# Patient Record
Sex: MALE | Race: WHITE | NOT HISPANIC OR LATINO | Employment: FULL TIME | URBAN - METROPOLITAN AREA
[De-identification: names, ages, dates, MRNs, and addresses within clinical notes are randomized per-mention and may not be internally consistent; named-entity substitution may affect disease eponyms.]

---

## 2023-04-26 ENCOUNTER — OFFICE VISIT (OUTPATIENT)
Dept: PRIMARY CARE | Facility: CLINIC | Age: 53
End: 2023-04-26
Payer: COMMERCIAL

## 2023-04-26 VITALS
SYSTOLIC BLOOD PRESSURE: 132 MMHG | DIASTOLIC BLOOD PRESSURE: 79 MMHG | BODY MASS INDEX: 28 KG/M2 | HEIGHT: 78 IN | RESPIRATION RATE: 18 BRPM | WEIGHT: 242 LBS

## 2023-04-26 DIAGNOSIS — R73.01 ELEVATED FASTING GLUCOSE: ICD-10-CM

## 2023-04-26 DIAGNOSIS — Z00.00 HEALTH MAINTENANCE EXAMINATION: Primary | ICD-10-CM

## 2023-04-26 DIAGNOSIS — N52.9 ERECTILE DYSFUNCTION, UNSPECIFIED ERECTILE DYSFUNCTION TYPE: ICD-10-CM

## 2023-04-26 DIAGNOSIS — M54.16 LUMBAR RADICULOPATHY: ICD-10-CM

## 2023-04-26 DIAGNOSIS — Z12.11 COLON CANCER SCREENING: ICD-10-CM

## 2023-04-26 DIAGNOSIS — Z23 IMMUNIZATION DUE: ICD-10-CM

## 2023-04-26 DIAGNOSIS — E03.8 OTHER SPECIFIED HYPOTHYROIDISM: ICD-10-CM

## 2023-04-26 DIAGNOSIS — Z72.0 TOBACCO USE: ICD-10-CM

## 2023-04-26 DIAGNOSIS — K22.719 BARRETT'S ESOPHAGUS WITH DYSPLASIA: ICD-10-CM

## 2023-04-26 PROCEDURE — 1036F TOBACCO NON-USER: CPT | Performed by: STUDENT IN AN ORGANIZED HEALTH CARE EDUCATION/TRAINING PROGRAM

## 2023-04-26 PROCEDURE — 90715 TDAP VACCINE 7 YRS/> IM: CPT | Performed by: STUDENT IN AN ORGANIZED HEALTH CARE EDUCATION/TRAINING PROGRAM

## 2023-04-26 PROCEDURE — 90677 PCV20 VACCINE IM: CPT | Performed by: STUDENT IN AN ORGANIZED HEALTH CARE EDUCATION/TRAINING PROGRAM

## 2023-04-26 PROCEDURE — 90472 IMMUNIZATION ADMIN EACH ADD: CPT | Performed by: STUDENT IN AN ORGANIZED HEALTH CARE EDUCATION/TRAINING PROGRAM

## 2023-04-26 PROCEDURE — 99396 PREV VISIT EST AGE 40-64: CPT | Performed by: STUDENT IN AN ORGANIZED HEALTH CARE EDUCATION/TRAINING PROGRAM

## 2023-04-26 PROCEDURE — 90471 IMMUNIZATION ADMIN: CPT | Performed by: STUDENT IN AN ORGANIZED HEALTH CARE EDUCATION/TRAINING PROGRAM

## 2023-04-26 RX ORDER — NICOTINE 7MG/24HR
1 PATCH, TRANSDERMAL 24 HOURS TRANSDERMAL EVERY 24 HOURS
Qty: 14 PATCH | Refills: 0 | Status: SHIPPED | OUTPATIENT
Start: 2023-04-26 | End: 2023-04-26 | Stop reason: SDUPTHER

## 2023-04-26 RX ORDER — OMEPRAZOLE 20 MG/1
20 CAPSULE, DELAYED RELEASE ORAL DAILY
Qty: 30 CAPSULE | Refills: 11 | Status: SHIPPED | OUTPATIENT
Start: 2023-04-26 | End: 2023-04-26 | Stop reason: SDUPTHER

## 2023-04-26 RX ORDER — IBUPROFEN 200 MG
1 TABLET ORAL EVERY 24 HOURS
Qty: 14 PATCH | Refills: 0 | Status: SHIPPED | OUTPATIENT
Start: 2023-04-26 | End: 2024-01-23 | Stop reason: ALTCHOICE

## 2023-04-26 RX ORDER — IBUPROFEN 200 MG
1 TABLET ORAL EVERY 24 HOURS
Qty: 42 PATCH | Refills: 0 | Status: SHIPPED | OUTPATIENT
Start: 2023-04-26 | End: 2024-01-23 | Stop reason: ALTCHOICE

## 2023-04-26 RX ORDER — NICOTINE 7MG/24HR
1 PATCH, TRANSDERMAL 24 HOURS TRANSDERMAL EVERY 24 HOURS
Qty: 14 PATCH | Refills: 0 | Status: SHIPPED | OUTPATIENT
Start: 2023-04-26 | End: 2024-01-23 | Stop reason: ALTCHOICE

## 2023-04-26 RX ORDER — IBUPROFEN 200 MG
1 TABLET ORAL EVERY 24 HOURS
Qty: 42 PATCH | Refills: 0 | Status: SHIPPED | OUTPATIENT
Start: 2023-04-26 | End: 2023-04-26 | Stop reason: SDUPTHER

## 2023-04-26 RX ORDER — LEVOTHYROXINE SODIUM 112 UG/1
112 TABLET ORAL DAILY
Qty: 90 TABLET | Refills: 3 | Status: SHIPPED | OUTPATIENT
Start: 2023-04-26 | End: 2024-02-27 | Stop reason: SDUPTHER

## 2023-04-26 RX ORDER — OMEPRAZOLE 20 MG/1
20 CAPSULE, DELAYED RELEASE ORAL DAILY
Qty: 90 CAPSULE | Refills: 3 | Status: SHIPPED | OUTPATIENT
Start: 2023-04-26 | End: 2024-05-30 | Stop reason: WASHOUT

## 2023-04-26 RX ORDER — IBUPROFEN 200 MG
1 TABLET ORAL EVERY 24 HOURS
Qty: 14 PATCH | Refills: 0 | Status: SHIPPED | OUTPATIENT
Start: 2023-04-26 | End: 2023-04-26 | Stop reason: SDUPTHER

## 2023-04-26 RX ORDER — LEVOTHYROXINE SODIUM 112 UG/1
112 TABLET ORAL DAILY
Qty: 30 TABLET | Refills: 11 | Status: SHIPPED | OUTPATIENT
Start: 2023-04-26 | End: 2023-04-26 | Stop reason: SDUPTHER
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
## 2023-05-01 ENCOUNTER — LAB (OUTPATIENT)
Dept: LAB | Facility: LAB | Age: 53
End: 2023-05-01
Payer: COMMERCIAL

## 2023-05-01 DIAGNOSIS — R73.01 ELEVATED FASTING GLUCOSE: ICD-10-CM

## 2023-05-01 DIAGNOSIS — E03.8 OTHER SPECIFIED HYPOTHYROIDISM: ICD-10-CM

## 2023-05-01 DIAGNOSIS — N52.9 ERECTILE DYSFUNCTION, UNSPECIFIED ERECTILE DYSFUNCTION TYPE: ICD-10-CM

## 2023-05-01 DIAGNOSIS — Z00.00 HEALTH MAINTENANCE EXAMINATION: ICD-10-CM

## 2023-05-01 DIAGNOSIS — E78.1 HYPERTRIGLYCERIDEMIA: Primary | ICD-10-CM

## 2023-05-01 LAB
ALANINE AMINOTRANSFERASE (SGPT) (U/L) IN SER/PLAS: 21 U/L (ref 10–52)
ALBUMIN (G/DL) IN SER/PLAS: 4.6 G/DL (ref 3.4–5)
ALKALINE PHOSPHATASE (U/L) IN SER/PLAS: 80 U/L (ref 33–120)
ANION GAP IN SER/PLAS: 12 MMOL/L (ref 10–20)
ASPARTATE AMINOTRANSFERASE (SGOT) (U/L) IN SER/PLAS: 16 U/L (ref 9–39)
BILIRUBIN TOTAL (MG/DL) IN SER/PLAS: 0.6 MG/DL (ref 0–1.2)
CALCIDIOL (25 OH VITAMIN D3) (NG/ML) IN SER/PLAS: 14 NG/ML
CALCIUM (MG/DL) IN SER/PLAS: 9.5 MG/DL (ref 8.6–10.3)
CARBON DIOXIDE, TOTAL (MMOL/L) IN SER/PLAS: 29 MMOL/L (ref 21–32)
CHLORIDE (MMOL/L) IN SER/PLAS: 103 MMOL/L (ref 98–107)
CHOLESTEROL (MG/DL) IN SER/PLAS: 136 MG/DL (ref 0–199)
CHOLESTEROL IN HDL (MG/DL) IN SER/PLAS: 30.9 MG/DL
CHOLESTEROL IN LDL (MG/DL) IN SER/PLAS BY DIRECT ASSAY: 78 MG/DL (ref 0–129)
CHOLESTEROL/HDL RATIO: 4.4
CREATININE (MG/DL) IN SER/PLAS: 0.99 MG/DL (ref 0.5–1.3)
ERYTHROCYTE DISTRIBUTION WIDTH (RATIO) BY AUTOMATED COUNT: 12.9 % (ref 11.5–14.5)
ERYTHROCYTE MEAN CORPUSCULAR HEMOGLOBIN CONCENTRATION (G/DL) BY AUTOMATED: 34.7 G/DL (ref 32–36)
ERYTHROCYTE MEAN CORPUSCULAR VOLUME (FL) BY AUTOMATED COUNT: 93 FL (ref 80–100)
ERYTHROCYTES (10*6/UL) IN BLOOD BY AUTOMATED COUNT: 5.14 X10E12/L (ref 4.5–5.9)
ESTIMATED AVERAGE GLUCOSE FOR HBA1C: 151 MG/DL
GFR MALE: >90 ML/MIN/1.73M2
GLUCOSE (MG/DL) IN SER/PLAS: 137 MG/DL (ref 74–99)
HEMATOCRIT (%) IN BLOOD BY AUTOMATED COUNT: 47.9 % (ref 41–52)
HEMOGLOBIN (G/DL) IN BLOOD: 16.6 G/DL (ref 13.5–17.5)
HEMOGLOBIN A1C/HEMOGLOBIN TOTAL IN BLOOD: 6.9 %
HEPATITIS C VIRUS AB PRESENCE IN SERUM: NONREACTIVE
LDL: ABNORMAL MG/DL (ref 0–99)
LEUKOCYTES (10*3/UL) IN BLOOD BY AUTOMATED COUNT: 8.7 X10E9/L (ref 4.4–11.3)
NON HDL CHOLESTEROL: 105 MG/DL
PLATELETS (10*3/UL) IN BLOOD AUTOMATED COUNT: 287 X10E9/L (ref 150–450)
POTASSIUM (MMOL/L) IN SER/PLAS: 3.8 MMOL/L (ref 3.5–5.3)
PROTEIN TOTAL: 7 G/DL (ref 6.4–8.2)
SODIUM (MMOL/L) IN SER/PLAS: 140 MMOL/L (ref 136–145)
THYROTROPIN (MIU/L) IN SER/PLAS BY DETECTION LIMIT <= 0.05 MIU/L: 5.07 MIU/L (ref 0.44–3.98)
THYROXINE (T4) FREE (NG/DL) IN SER/PLAS: 0.85 NG/DL (ref 0.61–1.12)
TRIGLYCERIDE (MG/DL) IN SER/PLAS: 484 MG/DL (ref 0–149)
UREA NITROGEN (MG/DL) IN SER/PLAS: 18 MG/DL (ref 6–23)
VLDL: ABNORMAL MG/DL (ref 0–40)

## 2023-05-01 PROCEDURE — 80061 LIPID PANEL: CPT

## 2023-05-01 PROCEDURE — 83036 HEMOGLOBIN GLYCOSYLATED A1C: CPT

## 2023-05-01 PROCEDURE — 85027 COMPLETE CBC AUTOMATED: CPT

## 2023-05-01 PROCEDURE — 36415 COLL VENOUS BLD VENIPUNCTURE: CPT

## 2023-05-01 PROCEDURE — 80053 COMPREHEN METABOLIC PANEL: CPT

## 2023-05-01 PROCEDURE — 84403 ASSAY OF TOTAL TESTOSTERONE: CPT

## 2023-05-01 PROCEDURE — 86803 HEPATITIS C AB TEST: CPT

## 2023-05-01 PROCEDURE — 84439 ASSAY OF FREE THYROXINE: CPT

## 2023-05-01 PROCEDURE — 82306 VITAMIN D 25 HYDROXY: CPT

## 2023-05-01 PROCEDURE — 83721 ASSAY OF BLOOD LIPOPROTEIN: CPT

## 2023-05-01 PROCEDURE — 84402 ASSAY OF FREE TESTOSTERONE: CPT

## 2023-05-01 PROCEDURE — 84443 ASSAY THYROID STIM HORMONE: CPT

## 2023-05-04 RX ORDER — IBUPROFEN 100 MG/5ML
1 SUSPENSION, ORAL (FINAL DOSE FORM) ORAL DAILY
COMMUNITY

## 2023-05-10 DIAGNOSIS — M54.16 LUMBAR RADICULOPATHY: Primary | ICD-10-CM

## 2023-05-10 NOTE — PROGRESS NOTES
MRI L spine denied by insurance given no recent attempt at PT   Physical therapy referral placed today   MRI L spine deferred at this time  Prior plain films in 04/2022, defer repeat plain film imaging at this time  Will again pursue MRI L spine w/o contrast if no improvement with PT    Faizan Purvis MD

## 2023-05-12 LAB
TESTOSTERONE FREE (CHAN): 75.1 PG/ML (ref 35–155)
TESTOSTERONE,TOTAL,LC-MS/MS: 278 NG/DL (ref 250–1100)

## 2023-08-07 ENCOUNTER — TELEPHONE (OUTPATIENT)
Dept: PRIMARY CARE | Facility: CLINIC | Age: 53
End: 2023-08-07

## 2023-08-07 DIAGNOSIS — Z72.0 TOBACCO USE: Primary | ICD-10-CM

## 2023-08-07 DIAGNOSIS — Z12.2 ENCOUNTER FOR SCREENING FOR LUNG CANCER: ICD-10-CM

## 2023-08-07 NOTE — PROGRESS NOTES
Lung cancer screening CT ordered.   Patient would like to proceed as discussed during visit in 04/2023.  40 pack year history.     Faizan Purvis MD

## 2023-08-30 ENCOUNTER — LAB (OUTPATIENT)
Dept: LAB | Facility: LAB | Age: 53
End: 2023-08-30
Payer: COMMERCIAL

## 2023-08-30 DIAGNOSIS — E03.8 OTHER SPECIFIED HYPOTHYROIDISM: ICD-10-CM

## 2023-08-30 DIAGNOSIS — E78.1 HYPERTRIGLYCERIDEMIA: ICD-10-CM

## 2023-08-30 DIAGNOSIS — R73.01 ELEVATED FASTING GLUCOSE: ICD-10-CM

## 2023-08-30 LAB
CHOLESTEROL (MG/DL) IN SER/PLAS: 121 MG/DL (ref 0–199)
CHOLESTEROL IN HDL (MG/DL) IN SER/PLAS: 33.9 MG/DL
CHOLESTEROL/HDL RATIO: 3.6
ESTIMATED AVERAGE GLUCOSE FOR HBA1C: 126 MG/DL
HEMOGLOBIN A1C/HEMOGLOBIN TOTAL IN BLOOD: 6 %
LDL: 60 MG/DL (ref 0–99)
THYROTROPIN (MIU/L) IN SER/PLAS BY DETECTION LIMIT <= 0.05 MIU/L: 3.72 MIU/L (ref 0.44–3.98)
TRIGLYCERIDE (MG/DL) IN SER/PLAS: 136 MG/DL (ref 0–149)
VLDL: 27 MG/DL (ref 0–40)

## 2023-08-30 PROCEDURE — 80061 LIPID PANEL: CPT

## 2023-08-30 PROCEDURE — 36415 COLL VENOUS BLD VENIPUNCTURE: CPT

## 2023-08-30 PROCEDURE — 84443 ASSAY THYROID STIM HORMONE: CPT

## 2023-08-30 PROCEDURE — 83036 HEMOGLOBIN GLYCOSYLATED A1C: CPT

## 2023-09-06 ENCOUNTER — APPOINTMENT (OUTPATIENT)
Dept: PRIMARY CARE | Facility: CLINIC | Age: 53
End: 2023-09-06
Payer: COMMERCIAL

## 2023-11-27 ENCOUNTER — OFFICE VISIT (OUTPATIENT)
Dept: ORTHOPEDIC SURGERY | Facility: HOSPITAL | Age: 53
End: 2023-11-27
Payer: COMMERCIAL

## 2023-11-27 DIAGNOSIS — M17.0 OSTEOARTHRITIS OF BOTH KNEES, UNSPECIFIED OSTEOARTHRITIS TYPE: Primary | ICD-10-CM

## 2023-11-27 PROCEDURE — 99203 OFFICE O/P NEW LOW 30 MIN: CPT | Performed by: EMERGENCY MEDICINE

## 2023-11-27 PROCEDURE — 1036F TOBACCO NON-USER: CPT | Performed by: EMERGENCY MEDICINE

## 2023-11-27 PROCEDURE — 99213 OFFICE O/P EST LOW 20 MIN: CPT | Performed by: EMERGENCY MEDICINE

## 2023-11-27 NOTE — PROGRESS NOTES
Subjective   Eliseo Patricio is a 53 y.o. male who presents for No chief complaint on file.    HPI  53-year-old male presents with complaint of bilateral knee pain that he had for quite some time but progressive worse in the past few weeks.  Pain is of gradual onset and constant duration with intermittent worsening, moderate severity pain is exacerbated by knee flexion and weightbearing activities.  Pain is temporarily alleviated by rest but pain is associated with stiffness and clicking.  Pain is not associated with acute trauma, deformed, ecchymosis, paresthesias, weakness, rash, erythema, or fevers.  Patient has known bilateral knee DJD, specifically in the patellofemoral compartments.  He has tried physical therapy, multiple analgesics, and previous corticosteroid injection therapy with limited relief.  Considering this, he presents today to discuss the option of viscosupplementation.    ROS: All pertinent positive symptoms are included in the history of present illness.    All other systems have been reviewed and are negative and noncontributory to this patient's current ailments.    Objective     There were no vitals filed for this visit.    Physical Exam  General/Constitutional: No apparent distress. Well-nourished and well developed.  Head: Normocephalic, Atraumatic.   Eyes: EOMI.  Vascular: No edema, swelling or tenderness, except as noted in detailed exam.  Respiratory: Non-labored breathing.  Integumentary: No impressive skin lesions present, except as noted in detailed exam.  Neurological: Alert and oriented.  Psychological:  Normal mood and affect.  Musculoskeletal: Normal, except as noted in detailed exam.  Right Knee  Flexion 130. Extension 0. Crepitus present with knee flexion/extension. No ecchymosis. Muscle strength 5 out of 5 with flexion and extension. Valgus stress negative. Varus stress negative.   Left Knee  Flexion 130. Extension 0. Crepitus present with knee flexion/extension. No ecchymosis.  Muscle strength 5 out of 5 with flexion and extension. Valgus stress negative. Varus stress negative.     XR KNEE 3 VIEWS BILATERAL    - Impression -  Small suprapatellar joint effusions.    Tricompartmental bilateral knee osteoarthrosis, most pronounced in  the patellofemoral compartments.    Assessment/Plan   Problem List Items Addressed This Visit    None  Visit Diagnoses       Osteoarthritis of both knees, unspecified osteoarthritis type    -  Primary          I discussed with patient and agree that their pain is likely due to worsening arthritis.  Considering they have failed previous conservative measures with activity modifications, therapy, multiple analgesics, and corticosteroid injections without significant improvement, I feel that HA supplementation would be the next best option.  We will begin preauthorization process for this.  I will see them back for HA supplementation injections when they are approved.    Osmar Griffin, DO  Sports Medicine  [facility]     ** Please excuse any errors in grammar or translation related to this dictation. Voice recognition software was utilized to prepare this document. **

## 2023-11-30 DIAGNOSIS — M17.0 OSTEOARTHRITIS OF BOTH KNEES, UNSPECIFIED OSTEOARTHRITIS TYPE: ICD-10-CM

## 2023-12-06 RX ORDER — HYALURONATE SODIUM 20 MG/2 ML
20 SYRINGE (ML) INTRAARTICULAR
Qty: 12 ML | Refills: 0 | Status: SHIPPED | OUTPATIENT
Start: 2023-12-06 | End: 2024-05-30 | Stop reason: WASHOUT

## 2023-12-14 ENCOUNTER — OFFICE VISIT (OUTPATIENT)
Dept: ORTHOPEDIC SURGERY | Facility: HOSPITAL | Age: 53
End: 2023-12-14
Payer: COMMERCIAL

## 2023-12-14 DIAGNOSIS — M77.12 LATERAL EPICONDYLITIS OF LEFT ELBOW: ICD-10-CM

## 2023-12-14 DIAGNOSIS — M75.22 BICEPS TENDINITIS OF LEFT SHOULDER: ICD-10-CM

## 2023-12-14 PROCEDURE — 99214 OFFICE O/P EST MOD 30 MIN: CPT | Performed by: EMERGENCY MEDICINE

## 2023-12-14 PROCEDURE — 1036F TOBACCO NON-USER: CPT | Performed by: EMERGENCY MEDICINE

## 2023-12-14 NOTE — PROGRESS NOTES
Chief Complaint: Pain of the Left Elbow  History of Present Illness:  Encounter date: 12/14/2023  Eliseo Patricio is a 53 y.o. male who presents today for evaluation of left elbow pain.  He is a  and right-hand-dominant.  This started approximately 6 months ago.  He rates the pain as a 6/10 today and describes it as sharp.  He localizes it to the medial and lateral left elbow.  Pain is worse when he picks up heavy objects.  He does have a feeling like his arm is going to give out on him.  He has been working with orthopedic surgery at the [facility].  He did occupational therapy for just over a month and had some improvement in his pain.  He had an MRI performed in September of this year which showed biceps tendinosis and radial capitellar osteoarthritis.  He denies radiation of this pain, numbness, tingling.    Problem List  There are no problems to display for this patient.      Past Medical History  No past medical history on file.    Past Surgical History  Past Surgical History:   Procedure Laterality Date    OTHER SURGICAL HISTORY  12/12/2019    [place] tooth extraction    OTHER SURGICAL HISTORY  12/12/2019    Tonsillectomy       Social History  Social History     Socioeconomic History    Marital status:      Spouse name: Not on file    Number of children: Not on file    Years of education: Not on file    Highest education level: Not on file   Occupational History    Not on file   Tobacco Use    Smoking status: Never    Smokeless tobacco: Never   Substance and Sexual Activity    Alcohol use: Not on file    Drug use: Not on file    Sexual activity: Not on file   Other Topics Concern    Not on file   Social History Narrative    Not on file     Social Determinants of Health     Financial Resource Strain: Not on file   Food Insecurity: Not on file   Transportation Needs: Not on file   Physical Activity: Not on file   Stress: Not on file   Social Connections: Not on file   Intimate  Partner Violence: Not on file   Housing Stability: Not on file       Allergies  Allergies   Allergen Reactions    Penicillins Hives       Medications  Current Outpatient Medications   Medication Sig Dispense Refill    levothyroxine (Synthroid, Levoxyl) 112 mcg tablet Take 1 tablet (112 mcg) by mouth once daily. 90 tablet 3    omeprazole (PriLOSEC) 20 mg DR capsule Take 1 capsule (20 mg) by mouth once daily. Do not crush or chew. 90 capsule 3    ascorbic acid (Vitamin C) 1,000 mg tablet Take 1 tablet (1,000 mg) by mouth once daily.      nicotine (Nicoderm CQ) 14 mg/24 hr patch Place 1 patch over 24 hours on the skin once every 24 hours for 14 days. 14 patch 0    nicotine (Nicoderm CQ) 21 mg/24 hr patch Place 1 patch over 24 hours on the skin once every 24 hours. 42 patch 0    nicotine (Nicoderm CQ) 7 mg/24 hr patch Place 1 patch over 24 hours on the skin once every 24 hours for 14 days. 14 patch 0    sodium hyaluronate (Euflexxa) 10 mg/mL(mw 2.4 -3.6 million) injection Inject 2 mL (20 mg) into the joint 1 (one) time per week. Inject intra-articularly into each knee on time 12 mL 0     No current facility-administered medications for this visit.       Physical Exam:  Left elbow Exam  Elbow range of motion is full. Pain with terminal extension. No elbow effusion. Tenderness to palpation over the lateral epicondyle and common extensor/supinator tendon and tenderness over the lateral portion of the distal biceps tendon.  No pain with palpation over the radial head.  Pain with resisted wrist extension, middle finger extension, and supination and mild pain with resisted elbow flexion.  Bicep strength 5/5.      Problem List Items Addressed This Visit    None  Visit Diagnoses         Codes    Lateral epicondylitis of left elbow     M77.12    Relevant Orders    Referral to Occupational Therapy    Biceps tendinitis of left shoulder     M75.22    Relevant Orders    Referral to Occupational Therapy          Patient  Discussion/Summary  Eliseo Patircio is a 53 y.o. who presents today for left elbow pain.  He is 6 months of left elbow pain and has been following with [facility] to begin and completed occupational therapy in October which did improve some of his pain. On physical exam he has had some tenderness over the biceps tendon and the extensor tendon. Discussed treatment options with the patients, we agreed to restart OT, trial Voltaren gel. He should follow up in 6 weeks. If he continues to have no improvement at that time we will get an MRI.     Donald Holland, DO  Primary Care Sports Medicine Fellow    ** Please excuse any errors in grammar or translation related to this dictation. Voice recognition software was utilized to prepare this document. **

## 2023-12-27 ENCOUNTER — EVALUATION (OUTPATIENT)
Dept: OCCUPATIONAL THERAPY | Facility: CLINIC | Age: 53
End: 2023-12-27
Payer: COMMERCIAL

## 2023-12-27 DIAGNOSIS — M75.22 BICEPS TENDINITIS OF LEFT SHOULDER: ICD-10-CM

## 2023-12-27 DIAGNOSIS — M77.12 LATERAL EPICONDYLITIS OF LEFT ELBOW: ICD-10-CM

## 2023-12-27 PROCEDURE — 97165 OT EVAL LOW COMPLEX 30 MIN: CPT | Mod: GO | Performed by: OCCUPATIONAL THERAPIST

## 2023-12-27 PROCEDURE — 97140 MANUAL THERAPY 1/> REGIONS: CPT | Mod: GO | Performed by: OCCUPATIONAL THERAPIST

## 2023-12-27 ASSESSMENT — PAIN SCALES - GENERAL: PAINLEVEL_OUTOF10: 7

## 2023-12-27 ASSESSMENT — PAIN - FUNCTIONAL ASSESSMENT: PAIN_FUNCTIONAL_ASSESSMENT: 0-10

## 2023-12-27 ASSESSMENT — PATIENT HEALTH QUESTIONNAIRE - PHQ9
1. LITTLE INTEREST OR PLEASURE IN DOING THINGS: NOT AT ALL
2. FEELING DOWN, DEPRESSED OR HOPELESS: NOT AT ALL
SUM OF ALL RESPONSES TO PHQ9 QUESTIONS 1 AND 2: 0

## 2023-12-27 ASSESSMENT — ENCOUNTER SYMPTOMS
DEPRESSION: 0
OCCASIONAL FEELINGS OF UNSTEADINESS: 0
LOSS OF SENSATION IN FEET: 0

## 2023-12-27 ASSESSMENT — PAIN DESCRIPTION - DESCRIPTORS: DESCRIPTORS: SHARP

## 2023-12-27 NOTE — PROGRESS NOTES
Occupational Therapy    Occupational Therapy Evaluation    Name: Eliseo Patricio  MRN: [id number]  : 1970   DATE: 23   Time Calculation  Start Time: 1205  Stop Time: 1255  Time Calculation (min): 50 min     Insurance Authorization Request: [employer], 20.00 COPAY, 100% COVERAGE, 40V ( 32 REMAIN) NO AUTH     Assessment:  Patient is a 53 y.o. male who is diagnosed with left biceps tendinosis and lateral epicondylitis, treated conservatively.       Patient presented with left elbow pain localized in distal bicep tendon and lateral elbow.  He resides home alone independently, works as a  (job tasks include heavy lifting). 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 Started patient on stretching exercises to resume muscle balancing. Also issued him scraper tool for soft tissue mobilization at home along distal biceps/brachioradialis/wrist extensor wad to promote tissue healing. Patient will benefit from skilled OT for pain management and later progress to strengthening to support return to all ADL/ IADL, work, and leisure activities.     Plan:  Outpatient Plan  OT Plan: Modalities, therapeutic exercise, therapeutic activity, neuromotor re-education, manual therapy  Frequency: 1x/wk  Duration: 4 weeks  Rehab Potential: Good  Plan of Care Agreement: Patient    Subjective   Current Problem:  Problem List Items Addressed This Visit    None  Visit Diagnoses       Lateral epicondylitis of left elbow        Biceps tendinitis of left shoulder                 DOI: 2023    Surgical Procedure: N/A  DOS: N/A  Hand Dominance: right   Affected Extremity: left    Mechanism of Injury: Was doing bicep curls in  and started to have pain in left elbow. Received OT services at [facility] initially, was treated with ultrasound but found no relief with it. MRI confirmed biceps tendonesis. Recently developed lateral elbow pain as well.      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 Be able to lift grocery bags or metals at work without pain.\"      Objective   UE Assessment  Observation: No edema observed     Palpation: Mild tenderness upon palpation over distal biceps and lateral epicondyle    Range of Motion (in degrees)  Shoulder AROM: WNL    PROM: WNL    Elbow AROM:  WNL    PROM: WNL    Wrist AROM: WNL     PROM: WNL    Digits AROM:  WNL PROM: WNL    Thumb AROM: WNL   PROM: WNL    Sensation: Numbness in left fingertips from herniated disc in the neck      Strength: MMT  Elbow flexion 5/5 no pain   Forearm sup 5/5 reports lateral elbow  Wrist extension with elbow flexed 5/5 no pain  Wrist extension with elbow straight 5/5 no pain     Coordination: N/A     Treatment Performed: Instructed patient to perform biceps stretching and dorsal compartment stretching. Issued patient scraper tool for deep tissue mobilization over distal biceps/brachioradialis/lateral epicondyle/extensor wad to promote tissue healing. Also issued him a compression sleeve to wear at work for biofeedback during heavy lifting.     Outcome Measures:  Quick Dash Score: at eval 22.73%    OP EDUCATION:  Education  Individual(s) Educated: Patient  Education Provided: Anatomy & Physiology, Risk and benefits of OT discussed with patient or other, POC discussed and agreed upon  Home Program: PROM, Activity modification, Handout issued  Patient/Caregiver Demonstrated Understanding: yes  Plan of Care Discussed and Agreed Upon: yes  Patient Response to " Education: Patient/Caregiver Verbalized Understanding of Information, Patient/Caregiver Performed Return Demonstration of Exercises/Activities, Patient/Caregiver Asked Appropriate Questions     Goals:  By discharge (4 weeks) Eliseo Sintia  will achieve the following goals:     1. Patient to report pain 0/10 at rest for sleeping  2. Patient to lift and carry 20# with left hand with no difficulty to support his workout routine  3. Patient to demonstrate  strength left hand to be 70% of dominant side for work tasks   4. Patient to demonstrate proper technique and competence with HEP   5. Patient to score disability rate less than 10% on Quick DASH

## 2024-01-08 ENCOUNTER — TREATMENT (OUTPATIENT)
Dept: OCCUPATIONAL THERAPY | Facility: CLINIC | Age: 54
End: 2024-01-08
Payer: COMMERCIAL

## 2024-01-08 DIAGNOSIS — M77.12 LATERAL EPICONDYLITIS OF LEFT ELBOW: Primary | ICD-10-CM

## 2024-01-08 DIAGNOSIS — M75.22 BICEPS TENDINITIS OF LEFT SHOULDER: ICD-10-CM

## 2024-01-08 PROCEDURE — 97535 SELF CARE MNGMENT TRAINING: CPT | Mod: GO

## 2024-01-08 PROCEDURE — 97110 THERAPEUTIC EXERCISES: CPT | Mod: GO

## 2024-01-08 ASSESSMENT — ACTIVITIES OF DAILY LIVING (ADL): HOME_MANAGEMENT_TIME_ENTRY: 30

## 2024-01-08 NOTE — PROGRESS NOTES
Occupational Therapy    Occupational Therapy Evaluation    Name: Eliseo Patricio  MRN: [id number]  : 1970   DATE: 24         Insurance Authorization Request: [employer], 20.00 COPAY, 100% COVERAGE, 40V ( 32 REMAIN) NO AUTH     Assessment:  Patient is a 53 y.o. male who is diagnosed with left biceps tendinosis and lateral epicondylitis, treated conservatively.     He has decreased pain in Left bicep and medial elbow and attributes decreased pain due to doing less heavy repetitive lifting/carrying at work.    Patient presented with mild palpable left lateral>medial elbow pain and distal bicep tendrness.  He is getting better and will cancel next week with a recheck in 2 weeks.  He resides home alone independently, works as a  (job tasks include heavy lifting). 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 Started patient on stretching exercises to resume muscle balancing. Also issued him scraper tool for soft tissue mobilization at home along distal biceps/brachioradialis/wrist extensor wad to promote tissue healing. Patient will benefit from skilled OT for pain management and later progress to strengthening to support return to all ADL/ IADL, work, and leisure activities.     Plan:  RTC in 2 weeks to recheck symptoms.  He is getting better, but this week will be the 1st full week since the holiday 4 day/work week.  If symptoms do not continue to improve, then will initiate modalities and soft tissue work.       Subjective  Is doing the stretches a couple of times a day.  Is attending the gym 3-4 x week.  Current Problem:  Problem List Items Addressed This Visit    None         DOI: 2023    Surgical Procedure: N/A  DOS: N/A  Hand Dominance: right   Affected Extremity: left    Mechanism of Injury: Was doing bicep curls in  and started to have pain in left elbow. Received OT services at [facility] initially, was treated with ultrasound but found no  "relief with it. MRI confirmed biceps tendonesis. Recently developed lateral elbow pain as well.      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 Be able to lift grocery bags or metals at work without pain.\"      Objective   UE Assessment  Observation: No edema observed     Palpation: Mild tenderness upon palpation over distal biceps and lateral epicondyle    Range of Motion (in degrees)  Shoulder AROM: WNL    PROM: WNL    Elbow AROM:  WNL    PROM: WNL    Wrist AROM: WNL     PROM: WNL    Digits AROM:  WNL PROM: WNL    Thumb AROM: WNL   PROM: WNL    Sensation: Numbness in left fingertips from herniated disc in the neck      Strength: MMT  Elbow flexion 5/5 no pain   Forearm sup 5/5 reports lateral elbow  Wrist extension with elbow flexed 5/5 no pain  Wrist extension with elbow straight 5/5 no pain     Coordination: N/A     Treatment Performed: Instructed patient to perform biceps stretching and dorsal compartment stretching. Issued patient scraper tool for deep tissue mobilization over distal biceps/brachioradialis/lateral epicondyle/extensor wad to promote tissue healing. Also issued him a compression sleeve to wear at work for biofeedback during heavy lifting.     In 3:30p out 4:15p  45 min  Home management:  2 units x 30 min  TE x 1 - 15 min  Ergonomic education   provided a compression sleeve to wear at work and at night prn (might purchase a 2nd to wear at night)  Tubigrip size E provided x 2  Reviewed gym guidelines:  strengthen core muscles and " extensors vs flexors  Red tband core strengthening sitting or supine:  scapula retractio  n  Outcome Measures:  Quick Dash Score: at eval 22.73%      Goals:  By discharge (4 weeks) Eliseo Patricio  will achieve the following goals:     1. Patient to report pain 0/10 at rest for sleeping  2. Patient to lift and carry 20# with left hand with no difficulty to support his workout routine  3. Patient to demonstrate  strength left hand to be 70% of dominant side for work tasks   4. Patient to demonstrate proper technique and competence with HEP   5. Patient to score disability rate less than 10% on Quick DASH

## 2024-01-15 ENCOUNTER — APPOINTMENT (OUTPATIENT)
Dept: OCCUPATIONAL THERAPY | Facility: CLINIC | Age: 54
End: 2024-01-15
Payer: COMMERCIAL

## 2024-01-19 ENCOUNTER — APPOINTMENT (OUTPATIENT)
Dept: PRIMARY CARE | Facility: CLINIC | Age: 54
End: 2024-01-19
Payer: COMMERCIAL

## 2024-01-22 ENCOUNTER — APPOINTMENT (OUTPATIENT)
Dept: OCCUPATIONAL THERAPY | Facility: CLINIC | Age: 54
End: 2024-01-22
Payer: COMMERCIAL

## 2024-01-22 ENCOUNTER — APPOINTMENT (OUTPATIENT)
Dept: ORTHOPEDIC SURGERY | Facility: HOSPITAL | Age: 54
End: 2024-01-22
Payer: COMMERCIAL

## 2024-01-23 ENCOUNTER — TELEMEDICINE (OUTPATIENT)
Dept: PRIMARY CARE | Facility: CLINIC | Age: 54
End: 2024-01-23
Payer: COMMERCIAL

## 2024-01-23 DIAGNOSIS — Z12.11 COLON CANCER SCREENING: ICD-10-CM

## 2024-01-23 DIAGNOSIS — K22.719 BARRETT'S ESOPHAGUS WITH DYSPLASIA: ICD-10-CM

## 2024-01-23 DIAGNOSIS — F51.01 PRIMARY INSOMNIA: Primary | ICD-10-CM

## 2024-01-23 PROCEDURE — 99442 PR PHYS/QHP TELEPHONE EVALUATION 11-20 MIN: CPT | Performed by: STUDENT IN AN ORGANIZED HEALTH CARE EDUCATION/TRAINING PROGRAM

## 2024-01-23 RX ORDER — HYDROXYZINE HYDROCHLORIDE 25 MG/1
25 TABLET, FILM COATED ORAL NIGHTLY PRN
Qty: 30 TABLET | Refills: 0 | Status: SHIPPED | OUTPATIENT
Start: 2024-01-23 | End: 2024-02-27 | Stop reason: ALTCHOICE

## 2024-01-29 ENCOUNTER — OFFICE VISIT (OUTPATIENT)
Dept: ORTHOPEDIC SURGERY | Facility: HOSPITAL | Age: 54
End: 2024-01-29
Payer: COMMERCIAL

## 2024-01-29 ENCOUNTER — APPOINTMENT (OUTPATIENT)
Dept: ORTHOPEDIC SURGERY | Facility: HOSPITAL | Age: 54
End: 2024-01-29
Payer: COMMERCIAL

## 2024-01-29 VITALS — HEIGHT: 75 IN | BODY MASS INDEX: 28.6 KG/M2 | WEIGHT: 230 LBS

## 2024-01-29 DIAGNOSIS — M17.0 OSTEOARTHRITIS OF BOTH KNEES, UNSPECIFIED OSTEOARTHRITIS TYPE: Primary | ICD-10-CM

## 2024-01-29 PROCEDURE — 99213 OFFICE O/P EST LOW 20 MIN: CPT | Performed by: EMERGENCY MEDICINE

## 2024-01-29 PROCEDURE — 2500000004 HC RX 250 GENERAL PHARMACY W/ HCPCS (ALT 636 FOR OP/ED): Performed by: EMERGENCY MEDICINE

## 2024-01-29 PROCEDURE — 20611 DRAIN/INJ JOINT/BURSA W/US: CPT | Performed by: EMERGENCY MEDICINE

## 2024-01-29 PROCEDURE — 1036F TOBACCO NON-USER: CPT | Performed by: EMERGENCY MEDICINE

## 2024-01-29 RX ADMIN — Medication 20 MG: at 15:32

## 2024-01-29 ASSESSMENT — PAIN SCALES - GENERAL: PAINLEVEL_OUTOF10: 3

## 2024-01-29 ASSESSMENT — ENCOUNTER SYMPTOMS
KNEE DEFORMITY: 1
KNEE SWELLING: 1

## 2024-01-29 ASSESSMENT — PAIN DESCRIPTION - DESCRIPTORS: DESCRIPTORS: ACHING

## 2024-01-29 ASSESSMENT — PAIN - FUNCTIONAL ASSESSMENT: PAIN_FUNCTIONAL_ASSESSMENT: 0-10

## 2024-01-29 NOTE — PROGRESS NOTES
"Chief Complaint: Follow-up of the Right Knee (Euflexxa Injection) and Follow-up of the Left Knee (Euflexxa Injection)  History of Present Illness:  Encounter date: 01/29/2024      Subjective   Eliseo Patricio is a 53 y.o. male who presents for Follow-up of the Right Knee (Euflexxa Injection) and Follow-up of the Left Knee (Euflexxa Injection)    Right Knee     Left Knee       1/29/24: Patient returns today for bilateral knee pain.  During his last visit on 11/27/2023, discussed the option of bilateral knee viscosupplementation injection.  He returns today to begin bilateral knee Euflexxa injection series.    11/27/23: 53-year-old male presents with complaint of bilateral knee pain that he had for quite some time but progressive worse in the past few weeks.  Pain is of gradual onset and constant duration with intermittent worsening, moderate severity pain is exacerbated by knee flexion and weightbearing activities.  Pain is temporarily alleviated by rest but pain is associated with stiffness and clicking.  Pain is not associated with acute trauma, deformed, ecchymosis, paresthesias, weakness, rash, erythema, or fevers.  Patient has known bilateral knee DJD, specifically in the patellofemoral compartments.  He has tried physical therapy, multiple analgesics, and previous corticosteroid injection therapy with limited relief.  Considering this, he presents today to discuss the option of viscosupplementation.    ROS: All pertinent positive symptoms are included in the history of present illness.    All other systems have been reviewed and are negative and noncontributory to this patient's current ailments.    Objective     Vitals:    01/29/24 1450   Weight: 104 kg (230 lb)   Height: 1.905 m (6' 3\")       Physical Exam  General/Constitutional: No apparent distress. Well-nourished and well developed.  Head: Normocephalic, Atraumatic.   Eyes: EOMI.  Vascular: No edema, swelling or tenderness, except as noted in detailed " exam.  Respiratory: Non-labored breathing.  Integumentary: No impressive skin lesions present, except as noted in detailed exam.  Neurological: Alert and oriented.  Psychological:  Normal mood and affect.  Musculoskeletal: Normal, except as noted in detailed exam.  Right Knee  Flexion 130. Extension 0. Crepitus present with knee flexion/extension. No ecchymosis. Muscle strength 5 out of 5 with flexion and extension. Valgus stress negative. Varus stress negative.   Left Knee  Flexion 130. Extension 0. Crepitus present with knee flexion/extension. No ecchymosis. Muscle strength 5 out of 5 with flexion and extension. Valgus stress negative. Varus stress negative.     Patient ID: Eliseo Patricio is a 53 y.o. male.    L Inj/Asp: R knee on 1/29/2024 3:32 PM  Indications: pain  Details: 22 G needle, ultrasound-guided superolateral approach  Medications: 20 mg sodium hyaluronate 10 mg/mL(mw 2.4 -3.6 million)  Outcome: tolerated well, no immediate complications  Procedure, treatment alternatives, risks and benefits explained, specific risks discussed. Immediately prior to procedure a time out was called to verify the correct patient, procedure, equipment, support staff and site/side marked as required. Patient was prepped and draped in the usual sterile fashion.       L Inj/Asp: L knee on 1/29/2024 3:32 PM  Indications: pain  Details: 22 G needle, ultrasound-guided superolateral approach  Medications: 20 mg sodium hyaluronate 10 mg/mL(mw 2.4 -3.6 million)  Outcome: tolerated well, no immediate complications  Procedure, treatment alternatives, risks and benefits explained, specific risks discussed. Immediately prior to procedure a time out was called to verify the correct patient, procedure, equipment, support staff and site/side marked as required. Patient was prepped and draped in the usual sterile fashion.           Assessment/Plan   Problem List Items Addressed This Visit    None  Visit Diagnoses       Osteoarthritis of  both knees, unspecified osteoarthritis type    -  Primary    Relevant Orders    Point of Care Ultrasound (Completed)            Discussed risks versus benefits of having injections performed. Patient has elected to proceed with procedures. Please refer to procedure notes above. Discussed with patient to limit weightbearing activities over the next 24-48 hours, they can then progress to full activities as tolerated. Discussed with patient to avoid water submersion over the next 2 days. Discussed with patient to call me immediately if they develop worsening pain, rash, erythema, or fevers. Patient should follow-up next week for repeat injections.    Osmar Griffin,   Sports Medicine  [facility]     ** Please excuse any errors in grammar or translation related to this dictation. Voice recognition software was utilized to prepare this document. **

## 2024-01-31 ENCOUNTER — APPOINTMENT (OUTPATIENT)
Dept: OCCUPATIONAL THERAPY | Facility: CLINIC | Age: 54
End: 2024-01-31
Payer: COMMERCIAL

## 2024-02-05 ENCOUNTER — OFFICE VISIT (OUTPATIENT)
Dept: ORTHOPEDIC SURGERY | Facility: HOSPITAL | Age: 54
End: 2024-02-05
Payer: COMMERCIAL

## 2024-02-05 DIAGNOSIS — M17.0 OSTEOARTHRITIS OF BOTH KNEES, UNSPECIFIED OSTEOARTHRITIS TYPE: Primary | ICD-10-CM

## 2024-02-05 PROCEDURE — 20611 DRAIN/INJ JOINT/BURSA W/US: CPT | Performed by: EMERGENCY MEDICINE

## 2024-02-05 PROCEDURE — 1036F TOBACCO NON-USER: CPT | Performed by: EMERGENCY MEDICINE

## 2024-02-05 PROCEDURE — 2500000004 HC RX 250 GENERAL PHARMACY W/ HCPCS (ALT 636 FOR OP/ED): Performed by: EMERGENCY MEDICINE

## 2024-02-05 RX ADMIN — Medication 20 MG: at 16:48

## 2024-02-05 ASSESSMENT — ENCOUNTER SYMPTOMS
KNEE DEFORMITY: 1
KNEE SWELLING: 1

## 2024-02-05 NOTE — PROGRESS NOTES
Chief Complaint: Follow-up of the Left Knee (Euflexxa inj. Pt provided/) and Follow-up of the Right Knee (Euflexxa inj. Pt provided/)  History of Present Illness:  Encounter date: 02/05/2024      Aidee Patricio is a 53 y.o. male who presents for Follow-up of the Left Knee (Euflexxa inj. Pt provided/) and Follow-up of the Right Knee (Euflexxa inj. Pt provided/)    Right Knee     Left Knee       2/5/24: Patient returns today for bilateral knee Euflexxa injections.  He has not yet had significant relief from the previous injections.  No additional complaints.    1/29/24: Patient returns today for bilateral knee pain.  During his last visit on 11/27/2023, discussed the option of bilateral knee viscosupplementation injection.  He returns today to begin bilateral knee Euflexxa injection series.    11/27/23: 53-year-old male presents with complaint of bilateral knee pain that he had for quite some time but progressive worse in the past few weeks.  Pain is of gradual onset and constant duration with intermittent worsening, moderate severity pain is exacerbated by knee flexion and weightbearing activities.  Pain is temporarily alleviated by rest but pain is associated with stiffness and clicking.  Pain is not associated with acute trauma, deformed, ecchymosis, paresthesias, weakness, rash, erythema, or fevers.  Patient has known bilateral knee DJD, specifically in the patellofemoral compartments.  He has tried physical therapy, multiple analgesics, and previous corticosteroid injection therapy with limited relief.  Considering this, he presents today to discuss the option of viscosupplementation.    ROS: All pertinent positive symptoms are included in the history of present illness.    All other systems have been reviewed and are negative and noncontributory to this patient's current ailments.    Objective     There were no vitals filed for this visit.      Physical Exam  General/Constitutional: No apparent  distress. Well-nourished and well developed.  Head: Normocephalic, Atraumatic.   Eyes: EOMI.  Vascular: No edema, swelling or tenderness, except as noted in detailed exam.  Respiratory: Non-labored breathing.  Integumentary: No impressive skin lesions present, except as noted in detailed exam.  Neurological: Alert and oriented.  Psychological:  Normal mood and affect.  Musculoskeletal: Normal, except as noted in detailed exam.  Right Knee  Flexion 130. Extension 0. Crepitus present with knee flexion/extension. No ecchymosis. Muscle strength 5 out of 5 with flexion and extension. Valgus stress negative. Varus stress negative.   Left Knee  Flexion 130. Extension 0. Crepitus present with knee flexion/extension. No ecchymosis. Muscle strength 5 out of 5 with flexion and extension. Valgus stress negative. Varus stress negative.     Patient ID: Eliseo Patricio is a 53 y.o. male.    L Inj/Asp: R knee on 2/5/2024 4:48 PM  Indications: pain  Details: 22 G needle, ultrasound-guided superolateral approach  Medications: 20 mg sodium hyaluronate 10 mg/mL(mw 2.4 -3.6 million)  Outcome: tolerated well, no immediate complications  Procedure, treatment alternatives, risks and benefits explained, specific risks discussed. Immediately prior to procedure a time out was called to verify the correct patient, procedure, equipment, support staff and site/side marked as required. Patient was prepped and draped in the usual sterile fashion.       L Inj/Asp: L knee on 2/5/2024 4:48 PM  Indications: pain  Details: 22 G needle, ultrasound-guided superolateral approach  Medications: 20 mg sodium hyaluronate 10 mg/mL(mw 2.4 -3.6 million)  Outcome: tolerated well, no immediate complications  Procedure, treatment alternatives, risks and benefits explained, specific risks discussed. Immediately prior to procedure a time out was called to verify the correct patient, procedure, equipment, support staff and site/side marked as required. Patient was  prepped and draped in the usual sterile fashion.           Assessment/Plan   Problem List Items Addressed This Visit    None  Visit Diagnoses       Osteoarthritis of both knees, unspecified osteoarthritis type        Relevant Orders    Point of Care Ultrasound (Completed)            Discussed risks versus benefits of having injections performed. Patient has elected to proceed with procedures. Please refer to procedure notes above. Discussed with patient to limit weightbearing activities over the next 24-48 hours, they can then progress to full activities as tolerated. Discussed with patient to avoid water submersion over the next 2 days. Discussed with patient to call me immediately if they develop worsening pain, rash, erythema, or fevers. Patient should follow-up next week for repeat injections.    Osmar Griffin, DO  Sports Medicine  [facility]     ** Please excuse any errors in grammar or translation related to this dictation. Voice recognition software was utilized to prepare this document. **

## 2024-02-09 ENCOUNTER — APPOINTMENT (OUTPATIENT)
Dept: ORTHOPEDIC SURGERY | Facility: CLINIC | Age: 54
End: 2024-02-09
Payer: COMMERCIAL

## 2024-02-16 ENCOUNTER — OFFICE VISIT (OUTPATIENT)
Dept: ORTHOPEDIC SURGERY | Facility: CLINIC | Age: 54
End: 2024-02-16

## 2024-02-16 ENCOUNTER — OFFICE VISIT (OUTPATIENT)
Dept: ORTHOPEDIC SURGERY | Facility: CLINIC | Age: 54
End: 2024-02-16
Payer: COMMERCIAL

## 2024-02-16 DIAGNOSIS — M17.0 OSTEOARTHRITIS OF BOTH KNEES, UNSPECIFIED OSTEOARTHRITIS TYPE: Primary | ICD-10-CM

## 2024-02-16 PROCEDURE — 1036F TOBACCO NON-USER: CPT | Performed by: EMERGENCY MEDICINE

## 2024-02-16 PROCEDURE — 0232T NJX PLATELET PLASMA: CPT | Performed by: EMERGENCY MEDICINE

## 2024-02-16 PROCEDURE — 20611 DRAIN/INJ JOINT/BURSA W/US: CPT | Performed by: EMERGENCY MEDICINE

## 2024-02-16 ASSESSMENT — ENCOUNTER SYMPTOMS
KNEE DEFORMITY: 1
KNEE SWELLING: 1
KNEE SWELLING: 1
KNEE DEFORMITY: 1

## 2024-02-16 NOTE — PROGRESS NOTES
Chief Complaint: No chief complaint on file.  History of Present Illness:  Encounter date: 02/16/2024      Subjective   Eliseo Patricio is a 53 y.o. male who presents for No chief complaint on file.    Right Knee     Left Knee       2/16/24: Patient returns today for bilateral knee Euflexxa and PRP injections.  He has not yet had significant relief from the previous Euflexxa injections.  No additional complaints.    2/5/24: Patient returns today for bilateral knee Euflexxa injections.  He has not yet had significant relief from the previous injections.  No additional complaints.    1/29/24: Patient returns today for bilateral knee pain.  During his last visit on 11/27/2023, discussed the option of bilateral knee viscosupplementation injection.  He returns today to begin bilateral knee Euflexxa injection series.    11/27/23: 53-year-old male presents with complaint of bilateral knee pain that he had for quite some time but progressive worse in the past few weeks.  Pain is of gradual onset and constant duration with intermittent worsening, moderate severity pain is exacerbated by knee flexion and weightbearing activities.  Pain is temporarily alleviated by rest but pain is associated with stiffness and clicking.  Pain is not associated with acute trauma, deformed, ecchymosis, paresthesias, weakness, rash, erythema, or fevers.  Patient has known bilateral knee DJD, specifically in the patellofemoral compartments.  He has tried physical therapy, multiple analgesics, and previous corticosteroid injection therapy with limited relief.  Considering this, he presents today to discuss the option of viscosupplementation.    ROS: All pertinent positive symptoms are included in the history of present illness.    All other systems have been reviewed and are negative and noncontributory to this patient's current ailments.    Objective     There were no vitals filed for this visit.      Physical Exam  General/Constitutional: No  apparent distress. Well-nourished and well developed.  Head: Normocephalic, Atraumatic.   Eyes: EOMI.  Vascular: No edema, swelling or tenderness, except as noted in detailed exam.  Respiratory: Non-labored breathing.  Integumentary: No impressive skin lesions present, except as noted in detailed exam.  Neurological: Alert and oriented.  Psychological:  Normal mood and affect.  Musculoskeletal: Normal, except as noted in detailed exam.  Right Knee  Flexion 130. Extension 0. Crepitus present with knee flexion/extension. No ecchymosis. Muscle strength 5 out of 5 with flexion and extension. Valgus stress negative. Varus stress negative.   Left Knee  Flexion 130. Extension 0. Crepitus present with knee flexion/extension. No ecchymosis. Muscle strength 5 out of 5 with flexion and extension. Valgus stress negative. Varus stress negative.     Patient ID: Eliseo Patricio is a 53 y.o. male.    Platelet Rich Plasma Injection-right knee joint  Lot: [id number]  Exp: 11/30/2025  Consent  After discussing the various treatment options for the condition,  It was agreed that a platelet rich plasma injection would the next step in treatment.  The nature of and the indications for PRP injections and / or local anaesthetic injection were reviewed in detail with the patient today.  The inherent risks of injection including infection, allergic reaction, increased pain, incomplete relief or temporary relief of symptoms, and nerve injury were discussed.      Procedure  After the risks and benefits of the procedure were explained, consent was given, and time-out was performed. The [employer] Kenneth system was used with 8mL ACDA, 52cc of blood was drawn from the patient's antecubital fossa.  This blood was spun for 20 minutes in the centrifuge to obtain 2.5 cc of Leukocyte Poor PRP.  The right knee joint and surrounding structures were visualized with ultrasound.   The site for the injection was properly marked and prepped with Chlorhexadine  solution.       The needle injection insertion site was anesthetized with ethyl chloride.  Using ultrasound guidance, the right knee joint was visualized and 2 mL of Euflexxa was injected using a 21-gauge 1.5 inch needle.  At that point, the syringe was exchanged and the 2.5 cc of PRP was then injected into the knee joint.    A sterile band-aide was applied.  Post-injection instructions were given regarding post-procedure care, when to follow up in clinic and what to expect from the procedure.  The patient tolerated the injection well and was discharged without complication.      Platelet Rich Plasma Injection-left knee joint  Lot: [id number]  Exp: 11/30/2025  Consent  After discussing the various treatment options for the condition,  It was agreed that a platelet rich plasma injection would the next step in treatment.  The nature of and the indications for PRP injections and / or local anaesthetic injection were reviewed in detail with the patient today.  The inherent risks of injection including infection, allergic reaction, increased pain, incomplete relief or temporary relief of symptoms, and nerve injury were discussed.      Procedure  After the risks and benefits of the procedure were explained, consent was given, and time-out was performed. The [employer] Kenneth system was used with 8mL ACDA, 52cc of blood was drawn from the patient's antecubital fossa.  This blood was spun for 20 minutes in the centrifuge to obtain 2.5 cc of Leukocyte Poor PRP.  The left knee joint and surrounding structures were visualized with ultrasound.   The site for the injection was properly marked and prepped with Chlorhexadine solution.       The needle injection insertion site was anesthetized with ethyl chloride.  Using ultrasound guidance, the left knee joint was visualized and 2 mL of Euflexxa was injected using a 21-gauge 1.5 inch needle.  At that point, the syringe was exchanged and the 2.5 cc of PRP was then injected into the knee  joint.    A sterile band-aide was applied.  Post-injection instructions were given regarding post-procedure care, when to follow up in clinic and what to expect from the procedure.  The patient tolerated the injection well and was discharged without complication.      Assessment/Plan   Problem List Items Addressed This Visit    None  Visit Diagnoses       Osteoarthritis of both knees, unspecified osteoarthritis type    -  Primary    Relevant Orders    Point of Care Ultrasound (Completed)          Discussed risks versus benefits of having injection performed. Patient has elected to proceed with procedure. Please refer to procedure note above. Discussed with patient to limit weightbearing activities over the next 24-48 hours, they can then progress to light activities as tolerated. Discussed with patient to avoid water submersion over the 48 hours. Discussed with patient to call me immediately if they develop worsening pain, rash, erythema, or fevers. Discussed extensively with patient to avoid anti-inflammatories, ice, and steroids for the next 2 weeks. Patient should follow-up as needed if pain persists or worsens.    Osmar Griffin, DO  Sports Medicine  [facility]     ** Please excuse any errors in grammar or translation related to this dictation. Voice recognition software was utilized to prepare this document. **

## 2024-02-16 NOTE — PROGRESS NOTES
Chief Complaint: Follow-up of the Left Knee (Euflexxa inj#3. Pt provided/PRP injection) and Follow-up of the Right Knee (Euflexxa inj #3. Pt provided/PRP injection)  History of Present Illness:  Encounter date: 02/16/2024      Subjective   Eliseo Patricio is a 53 y.o. male who presents for Follow-up of the Left Knee (Euflexxa inj#3. Pt provided/PRP injection) and Follow-up of the Right Knee (Euflexxa inj #3. Pt provided/PRP injection)    Right Knee     Left Knee       2/16/24: Patient returns today for bilateral knee Euflexxa and PRP injections.  He has not yet had significant relief from the previous Euflexxa injections.  No additional complaints.    2/5/24: Patient returns today for bilateral knee Euflexxa injections.  He has not yet had significant relief from the previous injections.  No additional complaints.    1/29/24: Patient returns today for bilateral knee pain.  During his last visit on 11/27/2023, discussed the option of bilateral knee viscosupplementation injection.  He returns today to begin bilateral knee Euflexxa injection series.    11/27/23: 53-year-old male presents with complaint of bilateral knee pain that he had for quite some time but progressive worse in the past few weeks.  Pain is of gradual onset and constant duration with intermittent worsening, moderate severity pain is exacerbated by knee flexion and weightbearing activities.  Pain is temporarily alleviated by rest but pain is associated with stiffness and clicking.  Pain is not associated with acute trauma, deformed, ecchymosis, paresthesias, weakness, rash, erythema, or fevers.  Patient has known bilateral knee DJD, specifically in the patellofemoral compartments.  He has tried physical therapy, multiple analgesics, and previous corticosteroid injection therapy with limited relief.  Considering this, he presents today to discuss the option of viscosupplementation.    ROS: All pertinent positive symptoms are included in the history of  present illness.    All other systems have been reviewed and are negative and noncontributory to this patient's current ailments.    Objective     There were no vitals filed for this visit.      Physical Exam  General/Constitutional: No apparent distress. Well-nourished and well developed.  Head: Normocephalic, Atraumatic.   Eyes: EOMI.  Vascular: No edema, swelling or tenderness, except as noted in detailed exam.  Respiratory: Non-labored breathing.  Integumentary: No impressive skin lesions present, except as noted in detailed exam.  Neurological: Alert and oriented.  Psychological:  Normal mood and affect.  Musculoskeletal: Normal, except as noted in detailed exam.  Right Knee  Flexion 130. Extension 0. Crepitus present with knee flexion/extension. No ecchymosis. Muscle strength 5 out of 5 with flexion and extension. Valgus stress negative. Varus stress negative.   Left Knee  Flexion 130. Extension 0. Crepitus present with knee flexion/extension. No ecchymosis. Muscle strength 5 out of 5 with flexion and extension. Valgus stress negative. Varus stress negative.     Patient ID: Eliseo Patricio is a 53 y.o. male.    Platelet Rich Plasma Injection-right knee joint  Lot: [id number]  Exp: 11/30/2025  Consent  After discussing the various treatment options for the condition,  It was agreed that a platelet rich plasma injection would the next step in treatment.  The nature of and the indications for PRP injections and / or local anaesthetic injection were reviewed in detail with the patient today.  The inherent risks of injection including infection, allergic reaction, increased pain, incomplete relief or temporary relief of symptoms, and nerve injury were discussed.      Procedure  After the risks and benefits of the procedure were explained, consent was given, and time-out was performed. The [employer] system was used with 8mL ACDA, 52cc of blood was drawn from the patient's antecubital fossa.  This blood was spun  for 20 minutes in the centrifuge to obtain 2.5 cc of Leukocyte Poor PRP.  The right knee joint and surrounding structures were visualized with ultrasound.   The site for the injection was properly marked and prepped with Chlorhexadine solution.       The needle injection insertion site was anesthetized with ethyl chloride.  Using ultrasound guidance, the right knee joint was visualized and 2 mL of Euflexxa was injected using a 21-gauge 1.5 inch needle.  At that point, the syringe was exchanged and the 2.5 cc of PRP was then injected into the knee joint.    A sterile band-aide was applied.  Post-injection instructions were given regarding post-procedure care, when to follow up in clinic and what to expect from the procedure.  The patient tolerated the injection well and was discharged without complication.      Platelet Rich Plasma Injection-left knee joint  Lot: [id number]  Exp: 11/30/2025  Consent  After discussing the various treatment options for the condition,  It was agreed that a platelet rich plasma injection would the next step in treatment.  The nature of and the indications for PRP injections and / or local anaesthetic injection were reviewed in detail with the patient today.  The inherent risks of injection including infection, allergic reaction, increased pain, incomplete relief or temporary relief of symptoms, and nerve injury were discussed.      Procedure  After the risks and benefits of the procedure were explained, consent was given, and time-out was performed. The [employer] system was used with 8mL ACDA, 52cc of blood was drawn from the patient's antecubital fossa.  This blood was spun for 20 minutes in the centrifuge to obtain 2.5 cc of Leukocyte Poor PRP.  The left knee joint and surrounding structures were visualized with ultrasound.   The site for the injection was properly marked and prepped with Chlorhexadine solution.       The needle injection insertion site was anesthetized with ethyl  chloride.  Using ultrasound guidance, the left knee joint was visualized and 2 mL of Euflexxa was injected using a 21-gauge 1.5 inch needle.  At that point, the syringe was exchanged and the 2.5 cc of PRP was then injected into the knee joint.    A sterile band-aide was applied.  Post-injection instructions were given regarding post-procedure care, when to follow up in clinic and what to expect from the procedure.  The patient tolerated the injection well and was discharged without complication.    Patient ID: Eliseo Patricio is a 53 y.o. male.    L Inj/Asp: R knee on 2/16/2024 3:09 PM  Indications: pain  Details: 22 G needle, ultrasound-guided superolateral approach  Medications: 20 mg sodium hyaluronate 10 mg/mL(mw 2.4 -3.6 million)  Outcome: tolerated well, no immediate complications  Procedure, treatment alternatives, risks and benefits explained, specific risks discussed. Immediately prior to procedure a time out was called to verify the correct patient, procedure, equipment, support staff and site/side marked as required. Patient was prepped and draped in the usual sterile fashion.       L Inj/Asp: L knee on 2/16/2024 3:09 PM  Indications: pain  Details: 22 G needle, ultrasound-guided superolateral approach  Medications: 20 mg sodium hyaluronate 10 mg/mL(mw 2.4 -3.6 million)  Outcome: tolerated well, no immediate complications  Procedure, treatment alternatives, risks and benefits explained, specific risks discussed. Immediately prior to procedure a time out was called to verify the correct patient, procedure, equipment, support staff and site/side marked as required. Patient was prepped and draped in the usual sterile fashion.           Assessment/Plan   Problem List Items Addressed This Visit    None  Visit Diagnoses       Osteoarthritis of both knees, unspecified osteoarthritis type    -  Primary            Discussed risks versus benefits of having injection performed. Patient has elected to proceed with  procedure. Please refer to procedure note above. Discussed with patient to limit weightbearing activities over the next 24-48 hours, they can then progress to light activities as tolerated. Discussed with patient to avoid water submersion over the 48 hours. Discussed with patient to call me immediately if they develop worsening pain, rash, erythema, or fevers. Discussed extensively with patient to avoid anti-inflammatories, ice, and steroids for the next 2 weeks. Patient should follow-up as needed if pain persists or worsens.    Osmar Griffin, DO  Sports Medicine  [facility]     ** Please excuse any errors in grammar or translation related to this dictation. Voice recognition software was utilized to prepare this document. **

## 2024-02-27 ENCOUNTER — OFFICE VISIT (OUTPATIENT)
Dept: PRIMARY CARE | Facility: CLINIC | Age: 54
End: 2024-02-27
Payer: COMMERCIAL

## 2024-02-27 VITALS
WEIGHT: 224 LBS | BODY MASS INDEX: 28 KG/M2 | HEART RATE: 63 BPM | RESPIRATION RATE: 18 BRPM | DIASTOLIC BLOOD PRESSURE: 53 MMHG | OXYGEN SATURATION: 96 % | SYSTOLIC BLOOD PRESSURE: 131 MMHG

## 2024-02-27 DIAGNOSIS — R79.89 ELEVATED TSH: ICD-10-CM

## 2024-02-27 DIAGNOSIS — Z13.220 LIPID SCREENING: ICD-10-CM

## 2024-02-27 DIAGNOSIS — H66.90 ACUTE OTITIS MEDIA, UNSPECIFIED OTITIS MEDIA TYPE: Primary | ICD-10-CM

## 2024-02-27 DIAGNOSIS — E55.9 MILD VITAMIN D DEFICIENCY: ICD-10-CM

## 2024-02-27 DIAGNOSIS — R53.83 OTHER FATIGUE: ICD-10-CM

## 2024-02-27 DIAGNOSIS — E11.69 TYPE 2 DIABETES MELLITUS WITH OTHER SPECIFIED COMPLICATION, WITHOUT LONG-TERM CURRENT USE OF INSULIN (MULTI): ICD-10-CM

## 2024-02-27 DIAGNOSIS — E03.8 OTHER SPECIFIED HYPOTHYROIDISM: ICD-10-CM

## 2024-02-27 DIAGNOSIS — Z12.5 PROSTATE CANCER SCREENING: ICD-10-CM

## 2024-02-27 PROCEDURE — 3078F DIAST BP <80 MM HG: CPT | Performed by: STUDENT IN AN ORGANIZED HEALTH CARE EDUCATION/TRAINING PROGRAM

## 2024-02-27 PROCEDURE — 99214 OFFICE O/P EST MOD 30 MIN: CPT | Performed by: STUDENT IN AN ORGANIZED HEALTH CARE EDUCATION/TRAINING PROGRAM

## 2024-02-27 PROCEDURE — 1036F TOBACCO NON-USER: CPT | Performed by: STUDENT IN AN ORGANIZED HEALTH CARE EDUCATION/TRAINING PROGRAM

## 2024-02-27 PROCEDURE — 3075F SYST BP GE 130 - 139MM HG: CPT | Performed by: STUDENT IN AN ORGANIZED HEALTH CARE EDUCATION/TRAINING PROGRAM

## 2024-02-27 RX ORDER — DOXYCYCLINE 100 MG/1
100 CAPSULE ORAL 2 TIMES DAILY
Qty: 14 CAPSULE | Refills: 0 | Status: SHIPPED | OUTPATIENT
Start: 2024-02-27 | End: 2024-03-05

## 2024-02-27 RX ORDER — LEVOTHYROXINE SODIUM 112 UG/1
112 TABLET ORAL DAILY
Qty: 90 TABLET | Refills: 3 | Status: SHIPPED | OUTPATIENT
Start: 2024-02-27 | End: 2024-05-02 | Stop reason: ALTCHOICE
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
## 2024-02-27 NOTE — PATIENT INSTRUCTIONS
Thank you for coming in today!    Let me know how sleep is going in the next few weeks once you transition back to day shift.     If persistent issues, may consider a medication called Trazodone and/or get you hooked up with a counselor.     FASTING first morning labs sometime in the next month or so.  Do these before our physical this spring.     You can continue to use the DEBROX ear drops as needed for your clogged ears.   Doxycycline for ear infection today; antibiotic to be taken twice daily for 7 days, take with food   Flonase nasal spray can also help with ear congestion (can be purchased over the counter)    Follow up in late April/early May for physical!    Get your colonoscopy and endoscopy scheduled!!!    Best,  Dr. NEAL

## 2024-03-02 DIAGNOSIS — F41.9 ANXIETY: Primary | ICD-10-CM

## 2024-03-07 DIAGNOSIS — H66.90 ACUTE OTITIS MEDIA, UNSPECIFIED OTITIS MEDIA TYPE: Primary | ICD-10-CM

## 2024-03-07 RX ORDER — LEVOFLOXACIN 500 MG/1
500 TABLET, FILM COATED ORAL DAILY
Qty: 5 TABLET | Refills: 0 | Status: SHIPPED | OUTPATIENT
Start: 2024-03-07 | End: 2024-03-12

## 2024-03-12 DIAGNOSIS — H66.90 ACUTE OTITIS MEDIA, UNSPECIFIED OTITIS MEDIA TYPE: Primary | ICD-10-CM

## 2024-03-29 PROBLEM — E11.9 TYPE 2 DIABETES MELLITUS (MULTI): Status: ACTIVE | Noted: 2024-03-29

## 2024-03-29 PROBLEM — M79.7 FIBROMYALGIA: Status: ACTIVE | Noted: 2024-03-29

## 2024-03-29 PROBLEM — E03.9 PRIMARY HYPOTHYROIDISM: Status: ACTIVE | Noted: 2024-03-29

## 2024-03-29 PROBLEM — K21.9 GERD (GASTROESOPHAGEAL REFLUX DISEASE): Status: ACTIVE | Noted: 2024-03-29

## 2024-03-29 PROBLEM — J45.909 ASTHMA (HHS-HCC): Status: ACTIVE | Noted: 2024-03-29

## 2024-04-01 ENCOUNTER — OFFICE VISIT (OUTPATIENT)
Dept: OTOLARYNGOLOGY | Facility: CLINIC | Age: 54
End: 2024-04-01
Payer: COMMERCIAL

## 2024-04-01 ENCOUNTER — CLINICAL SUPPORT (OUTPATIENT)
Dept: AUDIOLOGY | Facility: CLINIC | Age: 54
End: 2024-04-01
Payer: COMMERCIAL

## 2024-04-01 VITALS
TEMPERATURE: 97.3 F | BODY MASS INDEX: 28.2 KG/M2 | HEIGHT: 75 IN | SYSTOLIC BLOOD PRESSURE: 116 MMHG | WEIGHT: 226.8 LBS | DIASTOLIC BLOOD PRESSURE: 58 MMHG

## 2024-04-01 DIAGNOSIS — H93.13 TINNITUS OF BOTH EARS: ICD-10-CM

## 2024-04-01 DIAGNOSIS — H93.8X3 EAR PRESSURE, BILATERAL: Primary | ICD-10-CM

## 2024-04-01 DIAGNOSIS — J30.9 CHRONIC ALLERGIC RHINITIS: ICD-10-CM

## 2024-04-01 DIAGNOSIS — H69.91 DYSFUNCTION OF RIGHT EUSTACHIAN TUBE: Primary | ICD-10-CM

## 2024-04-01 PROCEDURE — 3078F DIAST BP <80 MM HG: CPT | Performed by: OTOLARYNGOLOGY

## 2024-04-01 PROCEDURE — 92557 COMPREHENSIVE HEARING TEST: CPT | Performed by: AUDIOLOGIST

## 2024-04-01 PROCEDURE — 1036F TOBACCO NON-USER: CPT | Performed by: OTOLARYNGOLOGY

## 2024-04-01 PROCEDURE — 3074F SYST BP LT 130 MM HG: CPT | Performed by: OTOLARYNGOLOGY

## 2024-04-01 PROCEDURE — 99204 OFFICE O/P NEW MOD 45 MIN: CPT | Performed by: OTOLARYNGOLOGY

## 2024-04-01 ASSESSMENT — ENCOUNTER SYMPTOMS: OCCASIONAL FEELINGS OF UNSTEADINESS: 0

## 2024-04-01 ASSESSMENT — PAIN - FUNCTIONAL ASSESSMENT: PAIN_FUNCTIONAL_ASSESSMENT: 0-10

## 2024-04-01 ASSESSMENT — PAIN SCALES - GENERAL: PAINLEVEL_OUTOF10: 0 - NO PAIN

## 2024-04-01 NOTE — PROGRESS NOTES
AUDIOLOGY ADULT AUDIOMETRIC EVALUATION      Name:  Eliseo Patricio  :  1970  Age:  53 y.o.  Date of Evaluation: 24    History:  Reason for visit:  Mr. Eliseo Patricio was seen today as part of the visit with Kd Singleton D.O. for an evaluation of hearing.   Chief Complaint   Patient presents with    Ear Fullness     Fluid sensation     Tinnitus     Stated he was recently seen and treated for an ear infection over one month ago. Stated he completed some medications and has been using Sudafed and has noticed overall improvements.   Mentioned he has noticed some slight left sided pressure and a sensation of fluid inside the ear.   History of bilateral non-pulsatile non-bothersome tinnitus and mentioned he utilizes a fan at night to assist with sleeping.  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 Stated he may have some hearing loss, due to the noise exposure, however, no problems hearing or communicating.   Denied any otalgia, dizziness/vertigo, ear surgery, recent falls, sinus/throat concerns, ear drainage, or sudden hearing loss.    EVALUATION     See Audiogram    RESULTS:    Otoscopic Evaluation:   Right Ear: Otoscopy revealed a clear healthy canal and a healthy tympanic membrane was visualized.   Left Ear: Otoscopy revealed deep hard occluding cerumen and the tympanic membrane was unable to be clearly visualized.    Immittance:  Immittance Measures: 226 Hz   Right Ear: CNT- Could not keep seal.  Left Ear: CNT- Could not keep seal.    Right Ear: CNT- Could not keep seal.  Left Ear: CNT- Could not keep seal.    Test technique:  Pure Tone Audiometry via TDH headphones    Reliability:   excellent    Pure Tone Audiometry:    Right Ear: Audiometric testing indicated normal peripheral hearing sensitivity from 125-8,000 Hz.   Left Ear:   Audiometric testing indicated normal peripheral hearing sensitivity through 6,000 Hz, with near normal hearing sensitivity above.       Speech  Audiometry:   Right Ear:  Speech Reception Threshold (SRT) was obtained at 10 dBHL                  Word Recognition scores were excellent (96%) in quiet when words were presented at 50 dBHL  Left Ear:  Speech Reception Threshold (SRT) was obtained at  10 dBHL                  Word Recognition scores were excellent (100%) in quiet when words were presented at 50 dBHL  Testing was performed with recorded NU-6 speech words in quiet. Speech thresholds were in good agreement with the pure tone averages in each ear.     IMPRESSIONS:  Today's test results are hearing loss requiring medical/otologic follow-up.  The patient was counseled with regard to the findings.    RECOMMENDATIONS:  * Continue medical follow up with Kd Singleton D.O.  * Retest as medically indicated, or sooner if a change in hearing sensitivity or tinnitus is noticed.   * Wear hearing protection while in the presence of loud sounds.   * Use tinnitus coping strategies as needed, such as sound apps on a smart phone, utilizing calming noise in the room, running a fan at night, etc.   * Use effective communication strategies such as asking the speaker to gain attention prior to speaking, speaking in the same room, repeating words that were heard, etc.    PATIENT EDUCATION:   Discussed results and recommendations with the patient and a copy of the hearing test was provided.  Questions were addressed and the patient was encouraged to contact our department should concerns arise.  The patient was seen from  1:00-1:30 pm.

## 2024-04-01 NOTE — PROGRESS NOTES
Impression:  1. Dysfunction of right eustachian tube        2. Tinnitus of both ears        3. Chronic allergic rhinitis             RECOMMENDATIONS/PLAN :  I reassured the patient there is no evidence of any residual infection however it appears that he is dealing with mild eustachian tube dysfunction.  I recommend Flonase nasal spray-2 puffs each nostril daily for at least 6 weeks.  He can also use Debrox wax softening drops in that left ear to remove any residual cerumen.  He will continue to use masking techniques to help cover up the ringing in his ears.      **This electronic medical record note was created with the use of voice recognition software.  Despite proofreading, typographical or grammatical errors may be present that could affect meaning of content **    Subjective   Patient ID:     Eliseo Patricio is a 53 y.o. male who presents to the office today stating that he has had pressure that seems to be worse in that right ear.  He took Sudafed and this seemed to alleviate it.  He did try Flonase for a couple of days but then stopped.  He continues to have intermittent pressure.  He denies any drainage from his ears.  He does have a history of mild hearing loss and ringing in the ears.  This is nonpulsatile in nature.    ROS:  A detailed 12 system review of systems is noted on the intake form has been reviewed with the patient with details noted in the HPI and scanned into the patient's medical record.    Objective     History reviewed. No pertinent past medical history.     Past Surgical History:   Procedure Laterality Date    OTHER SURGICAL HISTORY  12/12/2019    [employer] tooth extraction    OTHER SURGICAL HISTORY  12/12/2019    Tonsillectomy        Allergies   Allergen Reactions    Penicillins Hives          Current Outpatient Medications:     ascorbic acid (Vitamin C) 1,000 mg tablet, Take 1 tablet (1,000 mg) by mouth once daily., Disp: , Rfl:     levothyroxine (Synthroid, Levoxyl) 112 mcg tablet, Take 1  "tablet (112 mcg) by mouth once daily., Disp: 90 tablet, Rfl: 3    omeprazole (PriLOSEC) 20 mg DR capsule, Take 1 capsule (20 mg) by mouth once daily. Do not crush or chew. (Patient not taking: Reported on 4/1/2024), Disp: 90 capsule, Rfl: 3    sodium hyaluronate (Euflexxa) 10 mg/mL(mw 2.4 -3.6 million) injection, Inject 2 mL (20 mg) into the joint 1 (one) time per week. Inject intra-articularly into each knee on time (Patient not taking: Reported on 4/1/2024), Disp: 12 mL, Rfl: 0     Tobacco Use: Low Risk  (4/1/2024)    Patient History     Smoking Tobacco Use: Never     Smokeless Tobacco Use: Never     Passive Exposure: Not on file        Alcohol Use: Not on file        Social History     Substance and Sexual Activity   Drug Use Not Currently        Physical Exam:  Visit Vitals  /58   Temp 36.3 °C (97.3 °F) (Temporal)   Ht 1.905 m (6' 3\")   Wt 103 kg (226 lb 12.8 oz)   BMI 28.35 kg/m²   Smoking Status Never   BSA 2.33 m²      General: Patient is alert, oriented, cooperative in no apparent distress.  Head: Normocephalic, atraumatic.  Eyes: PERRL, EOMI, Conjunctiva is clear. No nystagmus.  Ears: Right Ear-- Pinna is normal.  External auditory canal is patent. Tympanic membrane is [intact, translucent and has good mobility with my pneumatic otoscope. No effusion].  Mastoid is nontender.  Left ear-- Pinna is normal.  External auditory canal is partially occluded with cerumen.. Tympanic membrane is [intact, translucent and has good mobility with my pneumatic otoscope.  No effusion].  Mastoid is nontender.  Nose: Septum is straight.  No septal perforation or lesions. No septal hematoma/ seroma.  No signs of bleeding.  Inferior turbinates are mildly swollen.   No evidence of intranasal polyps.  No infectious drainage.  Throat:  Floor of mouth is clear, no masses.  Tongue appears normal, no lesions or masses. Gums, gingiva, buccal mucosa appear pink and moist, no lesions. Teeth are in fair repair.  No obvious dental " infections.  Peritonsillar regions appear symmetric without swelling.  Hard and soft palate appear normal, no obvious cleft. Uvula is midline.  Oropharynx: No lesions. Retropharyngeal wall is flat.  No active postnasal drip.  Neck: Supple,  no lymphadenopathy.  No masses.  Salivary Glands: Symmetric bilaterally.  No palpable masses.  No evidence of acute infection or salivary stones  Neurologic: Cranial Nerves 2-12 are grossly intact without focal deficits. Cerebellar function testing is normal.     Results:   I reviewed his recent audiogram and his hearing is within normal limits with a very slight sensorineural loss in both ears.  Tympanic membrane's have good mobility on tympanometry.  Word recognition scores 96% in the right ear and 100% in the left ear.  Speech reception threshold is 10 dB bilaterally.    Procedure:   []    Kd Singleton, DO

## 2024-05-02 ENCOUNTER — LAB (OUTPATIENT)
Dept: LAB | Facility: LAB | Age: 54
End: 2024-05-02
Payer: COMMERCIAL

## 2024-05-02 DIAGNOSIS — J30.9 CHRONIC ALLERGIC RHINITIS: Primary | ICD-10-CM

## 2024-05-02 DIAGNOSIS — Z13.220 LIPID SCREENING: ICD-10-CM

## 2024-05-02 DIAGNOSIS — E11.69 TYPE 2 DIABETES MELLITUS WITH OTHER SPECIFIED COMPLICATION, WITHOUT LONG-TERM CURRENT USE OF INSULIN (MULTI): ICD-10-CM

## 2024-05-02 DIAGNOSIS — E55.9 MILD VITAMIN D DEFICIENCY: ICD-10-CM

## 2024-05-02 DIAGNOSIS — R53.83 OTHER FATIGUE: ICD-10-CM

## 2024-05-02 DIAGNOSIS — Z12.5 PROSTATE CANCER SCREENING: ICD-10-CM

## 2024-05-02 DIAGNOSIS — E03.8 OTHER SPECIFIED HYPOTHYROIDISM: Primary | ICD-10-CM

## 2024-05-02 DIAGNOSIS — R79.89 ELEVATED TSH: ICD-10-CM

## 2024-05-02 LAB
25(OH)D3 SERPL-MCNC: 44 NG/ML (ref 30–100)
ALBUMIN SERPL BCP-MCNC: 4.4 G/DL (ref 3.4–5)
ALP SERPL-CCNC: 63 U/L (ref 33–120)
ALT SERPL W P-5'-P-CCNC: 30 U/L (ref 10–52)
ANION GAP SERPL CALC-SCNC: 13 MMOL/L (ref 10–20)
AST SERPL W P-5'-P-CCNC: 30 U/L (ref 9–39)
BILIRUB SERPL-MCNC: 0.7 MG/DL (ref 0–1.2)
BUN SERPL-MCNC: 24 MG/DL (ref 6–23)
CALCIUM SERPL-MCNC: 9.1 MG/DL (ref 8.6–10.6)
CHLORIDE SERPL-SCNC: 103 MMOL/L (ref 98–107)
CHOLEST SERPL-MCNC: 109 MG/DL (ref 0–199)
CHOLESTEROL/HDL RATIO: 3
CO2 SERPL-SCNC: 29 MMOL/L (ref 21–32)
CREAT SERPL-MCNC: 1.06 MG/DL (ref 0.5–1.3)
CREAT UR-MCNC: 21.5 MG/DL (ref 20–370)
EGFRCR SERPLBLD CKD-EPI 2021: 83 ML/MIN/1.73M*2
ERYTHROCYTE [DISTWIDTH] IN BLOOD BY AUTOMATED COUNT: 13.2 % (ref 11.5–14.5)
EST. AVERAGE GLUCOSE BLD GHB EST-MCNC: 117 MG/DL
GLUCOSE SERPL-MCNC: 124 MG/DL (ref 74–99)
HBA1C MFR BLD: 5.7 %
HCT VFR BLD AUTO: 47.7 % (ref 41–52)
HDLC SERPL-MCNC: 36.8 MG/DL
HGB BLD-MCNC: 15.3 G/DL (ref 13.5–17.5)
LDLC SERPL CALC-MCNC: 54 MG/DL
MAGNESIUM SERPL-MCNC: 2.13 MG/DL (ref 1.6–2.4)
MCH RBC QN AUTO: 30 PG (ref 26–34)
MCHC RBC AUTO-ENTMCNC: 32.1 G/DL (ref 32–36)
MCV RBC AUTO: 94 FL (ref 80–100)
MICROALBUMIN UR-MCNC: <7 MG/L
MICROALBUMIN/CREAT UR: NORMAL MG/G{CREAT}
NON HDL CHOLESTEROL: 72 MG/DL (ref 0–149)
NRBC BLD-RTO: 0 /100 WBCS (ref 0–0)
PLATELET # BLD AUTO: 259 X10*3/UL (ref 150–450)
POTASSIUM SERPL-SCNC: 4.3 MMOL/L (ref 3.5–5.3)
PROT SERPL-MCNC: 6.7 G/DL (ref 6.4–8.2)
RBC # BLD AUTO: 5.1 X10*6/UL (ref 4.5–5.9)
SODIUM SERPL-SCNC: 141 MMOL/L (ref 136–145)
T4 FREE SERPL-MCNC: 1.09 NG/DL (ref 0.78–1.48)
TRIGL SERPL-MCNC: 91 MG/DL (ref 0–149)
TSH SERPL-ACNC: 4.08 MIU/L (ref 0.44–3.98)
VLDL: 18 MG/DL (ref 0–40)
WBC # BLD AUTO: 8.2 X10*3/UL (ref 4.4–11.3)

## 2024-05-02 PROCEDURE — 83735 ASSAY OF MAGNESIUM: CPT

## 2024-05-02 PROCEDURE — 36415 COLL VENOUS BLD VENIPUNCTURE: CPT

## 2024-05-02 PROCEDURE — 85027 COMPLETE CBC AUTOMATED: CPT

## 2024-05-02 PROCEDURE — 80061 LIPID PANEL: CPT

## 2024-05-02 PROCEDURE — 84443 ASSAY THYROID STIM HORMONE: CPT

## 2024-05-02 PROCEDURE — 80053 COMPREHEN METABOLIC PANEL: CPT

## 2024-05-02 PROCEDURE — 82570 ASSAY OF URINE CREATININE: CPT

## 2024-05-02 PROCEDURE — 82043 UR ALBUMIN QUANTITATIVE: CPT

## 2024-05-02 PROCEDURE — 84439 ASSAY OF FREE THYROXINE: CPT

## 2024-05-02 PROCEDURE — 83036 HEMOGLOBIN GLYCOSYLATED A1C: CPT

## 2024-05-02 PROCEDURE — 84154 ASSAY OF PSA FREE: CPT

## 2024-05-02 PROCEDURE — 84402 ASSAY OF FREE TESTOSTERONE: CPT

## 2024-05-02 PROCEDURE — 84153 ASSAY OF PSA TOTAL: CPT

## 2024-05-02 PROCEDURE — 82306 VITAMIN D 25 HYDROXY: CPT

## 2024-05-02 RX ORDER — LEVOTHYROXINE SODIUM 125 UG/1
125 TABLET ORAL DAILY
Qty: 90 TABLET | Refills: 1 | Status: SHIPPED | OUTPATIENT
Start: 2024-05-02 | End: 2024-10-29

## 2024-05-02 RX ORDER — FLUTICASONE PROPIONATE 50 MCG
2 SPRAY, SUSPENSION (ML) NASAL DAILY
Qty: 48 ML | Refills: 3 | Status: SHIPPED | OUTPATIENT
Start: 2024-05-02 | End: 2024-07-31

## 2024-05-02 NOTE — PROGRESS NOTES
Increase levothyroxine based on elevated TSH and symptoms from 112 to 125mcg dosing.   Repeat labs in 6-8 weeks.     Faizan Purvis MD

## 2024-05-04 LAB
PSA FREE MFR SERPL: 50 %
PSA FREE SERPL-MCNC: 0.2 NG/ML
PSA SERPL IA-MCNC: 0.4 NG/ML (ref 0–4)

## 2024-05-08 LAB
TESTOSTERONE FREE (CHAN): 56.2 PG/ML (ref 35–155)
TESTOSTERONE,TOTAL,LC-MS/MS: 296 NG/DL (ref 250–1100)

## 2024-05-20 PROBLEM — M25.561 BILATERAL KNEE PAIN: Status: ACTIVE | Noted: 2024-05-20

## 2024-05-20 PROBLEM — H52.223 REGULAR ASTIGMATISM OF BOTH EYES: Status: ACTIVE | Noted: 2024-05-20

## 2024-05-20 PROBLEM — M54.50 LOWER BACK PAIN: Status: ACTIVE | Noted: 2024-05-20

## 2024-05-20 PROBLEM — M79.601 PAIN OF RIGHT UPPER EXTREMITY: Status: ACTIVE | Noted: 2023-09-29

## 2024-05-20 PROBLEM — H53.19 PHOTOPSIA: Status: ACTIVE | Noted: 2024-05-20

## 2024-05-20 PROBLEM — H53.8 BLURRED VISION: Status: ACTIVE | Noted: 2024-05-20

## 2024-05-20 PROBLEM — E55.9 VITAMIN D DEFICIENCY: Status: ACTIVE | Noted: 2024-05-20

## 2024-05-20 PROBLEM — R07.81 ANTERIOR PLEURITIC PAIN: Status: ACTIVE | Noted: 2024-05-20

## 2024-05-20 PROBLEM — G56.01 CARPAL TUNNEL SYNDROME OF RIGHT WRIST: Status: ACTIVE | Noted: 2024-05-20

## 2024-05-20 PROBLEM — J20.9 ACUTE BRONCHITIS: Status: ACTIVE | Noted: 2024-05-20

## 2024-05-20 PROBLEM — M62.81 MUSCLE WEAKNESS: Status: ACTIVE | Noted: 2024-05-20

## 2024-05-20 PROBLEM — H93.13 TINNITUS OF BOTH EARS: Status: ACTIVE | Noted: 2024-05-20

## 2024-05-20 PROBLEM — K62.5 RECTAL HEMORRHAGE: Status: ACTIVE | Noted: 2024-05-20

## 2024-05-20 PROBLEM — M79.2 NEUROPATHIC PAIN: Status: ACTIVE | Noted: 2024-05-20

## 2024-05-20 PROBLEM — F51.01 PRIMARY INSOMNIA: Status: ACTIVE | Noted: 2024-05-20

## 2024-05-20 PROBLEM — M25.562 BILATERAL KNEE PAIN: Status: ACTIVE | Noted: 2024-05-20

## 2024-05-20 PROBLEM — N52.9 ERECTILE DYSFUNCTION: Status: ACTIVE | Noted: 2023-05-01

## 2024-05-20 PROBLEM — D12.6 TUBULAR ADENOMA OF COLON: Status: ACTIVE | Noted: 2024-05-20

## 2024-05-20 PROBLEM — R07.9 CHEST PAIN: Status: ACTIVE | Noted: 2024-05-20

## 2024-05-20 PROBLEM — J30.9 ALLERGIC RHINITIS: Status: ACTIVE | Noted: 2024-05-20

## 2024-05-20 PROBLEM — M77.12 LATERAL EPICONDYLITIS OF LEFT ELBOW: Status: ACTIVE | Noted: 2023-12-27

## 2024-05-20 PROBLEM — R53.83 FATIGUE: Status: ACTIVE | Noted: 2024-05-20

## 2024-05-20 PROBLEM — M54.12 CERVICAL RADICULOPATHY: Status: ACTIVE | Noted: 2024-05-20

## 2024-05-20 PROBLEM — R73.9 HYPERGLYCEMIA: Status: ACTIVE | Noted: 2023-08-30

## 2024-05-20 PROBLEM — M35.9 UNDIFFERENTIATED CONNECTIVE TISSUE DISEASE (MULTI): Status: ACTIVE | Noted: 2024-05-20

## 2024-05-20 PROBLEM — M25.50 ARTHRALGIA: Status: ACTIVE | Noted: 2024-05-20

## 2024-05-20 PROBLEM — M19.90 ARTHRITIS: Status: ACTIVE | Noted: 2024-05-20

## 2024-05-20 PROBLEM — R10.33 PERIUMBILICAL ABDOMINAL PAIN: Status: ACTIVE | Noted: 2024-05-20

## 2024-05-20 PROBLEM — K22.70 BARRETT ESOPHAGUS: Status: ACTIVE | Noted: 2024-03-29

## 2024-05-20 PROBLEM — S19.9XXA INJURY OF NECK: Status: ACTIVE | Noted: 2024-05-20

## 2024-05-20 PROBLEM — M35.9 AUTOIMMUNE DISEASE (MULTI): Status: ACTIVE | Noted: 2024-05-20

## 2024-05-20 PROBLEM — M76.60 ACHILLODYNIA: Status: ACTIVE | Noted: 2024-05-20

## 2024-05-20 PROBLEM — R03.0 ELEVATED BLOOD PRESSURE READING WITHOUT DIAGNOSIS OF HYPERTENSION: Status: ACTIVE | Noted: 2024-05-20

## 2024-05-20 PROBLEM — M76.60 ACHILLES TENDINITIS: Status: ACTIVE | Noted: 2024-05-20

## 2024-05-20 PROBLEM — R20.0 NUMBNESS OF HAND: Status: ACTIVE | Noted: 2024-05-20

## 2024-05-20 PROBLEM — M25.579 ANKLE PAIN: Status: ACTIVE | Noted: 2024-05-20

## 2024-05-23 ENCOUNTER — SOCIAL WORK (OUTPATIENT)
Dept: PRIMARY CARE | Facility: CLINIC | Age: 54
End: 2024-05-23
Payer: COMMERCIAL

## 2024-05-23 ASSESSMENT — ANXIETY QUESTIONNAIRES
2. NOT BEING ABLE TO STOP OR CONTROL WORRYING: SEVERAL DAYS
GAD7 TOTAL SCORE: 7
4. TROUBLE RELAXING: SEVERAL DAYS
1. FEELING NERVOUS, ANXIOUS, OR ON EDGE: SEVERAL DAYS
7. FEELING AFRAID AS IF SOMETHING AWFUL MIGHT HAPPEN: SEVERAL DAYS
5. BEING SO RESTLESS THAT IT IS HARD TO SIT STILL: SEVERAL DAYS
6. BECOMING EASILY ANNOYED OR IRRITABLE: SEVERAL DAYS
3. WORRYING TOO MUCH ABOUT DIFFERENT THINGS: SEVERAL DAYS

## 2024-05-23 ASSESSMENT — PATIENT HEALTH QUESTIONNAIRE - PHQ9
2. FEELING DOWN, DEPRESSED OR HOPELESS: SEVERAL DAYS
7. TROUBLE CONCENTRATING ON THINGS, SUCH AS READING THE NEWSPAPER OR WATCHING TELEVISION: NOT AT ALL
SUM OF ALL RESPONSES TO PHQ QUESTIONS 1-9: 4
5. POOR APPETITE OR OVEREATING: NOT AT ALL
6. FEELING BAD ABOUT YOURSELF - OR THAT YOU ARE A FAILURE OR HAVE LET YOURSELF OR YOUR FAMILY DOWN: SEVERAL DAYS
1. LITTLE INTEREST OR PLEASURE IN DOING THINGS: NOT AT ALL
SUM OF ALL RESPONSES TO PHQ9 QUESTIONS 1 & 2: 1
10. IF YOU CHECKED OFF ANY PROBLEMS, HOW DIFFICULT HAVE THESE PROBLEMS MADE IT FOR YOU TO DO YOUR WORK, TAKE CARE OF THINGS AT HOME, OR GET ALONG WITH OTHER PEOPLE: NOT DIFFICULT AT ALL
8. MOVING OR SPEAKING SO SLOWLY THAT OTHER PEOPLE COULD HAVE NOTICED. OR THE OPPOSITE, BEING SO FIGETY OR RESTLESS THAT YOU HAVE BEEN MOVING AROUND A LOT MORE THAN USUAL: NOT AT ALL
4. FEELING TIRED OR HAVING LITTLE ENERGY: NOT AT ALL
3. TROUBLE FALLING OR STAYING ASLEEP: MORE THAN HALF THE DAYS
9. THOUGHTS THAT YOU WOULD BE BETTER OFF DEAD, OR OF HURTING YOURSELF: NOT AT ALL

## 2024-05-23 NOTE — PROGRESS NOTES
"Collaborative Care (CoCM) Initial Assessment    Session Time  Start: 357  End: 448     Collaborative Care program information (including case discussion with psychiatry, involvement of [facility] and billing when applicable) was provided and discussed with the patient. Patient Indicated understanding and agreed to proceed.   Confirm: Yes    Patient Health Questionnaire-9 Score: 4 (5/23/2024  4:50 PM)  ALAN-7 Total Score: 7 (5/23/2024  4:49 PM)        Reason for Visit / Chief Complaint  Chief Complaint   Patient presents with    Anxiety   Eliseo is a 54 year old male referred for anxiety by Dr. Salazar. Patient reports an increase in anxiety and decrease in sleep since late October/early November.      Accompanied by: Self  Guardian Status: Self      Review of Symptoms    Sleep   - awake more than 2 days without sleep - thought I was tired but not tired enough to fall asleep- no increased energy despite sleep  - last time late feb when night shift ended  - has happened 3x since jan  - did not endorse symptoms of ximena twin sister is a nurse, he reports she asked him a lot of questions about his sleep and behaviors during that time  - falling asleep and quality of sleep is more of a problem- no middle insomnia   - started as a sleeping problem but now anxiety  - pt reports lack of sleep amplified in the fall when he started working 3rd shift   - has taken hydroxyzine- didn't work  - melatonin, Mg - \"took a dent out of the problem but it is not solved\"  - used the calm ester for meditation and music 4 days a week  - starting to meditate  - says 6-9 hrs of sleep would be ideal   - reports he is able to sleep an extra 1-1.5 hours on the weekends   - gets about 4 hours of sleep during the week   - tries to lay down at 10 but can't fall asleep until 11 or 12p  - no sleep hygiene  - gets anxious about sleeping     Mood   - pt feels down and bad about himself at times  - states he is in mid 50's and may be going through a midlife " "crisis  - reports he has been comparing his life to others lately-   - sometimes feels worthless, as he searches for answers as to why his life is the way i  - measure ashley reeves others     Anxiety   - anxiety started in the fall when he was unable to sleep  - worries a lot about several different things -  projects in the home that not finished, being alone as I get older, wanting to retire now \"afraid\" to sit around  - worry about getting old, what's going to happen to me, questioning life - not in a suicidal haroon way, \"kevin keeps me grounded\"  -during the winter got really anxious while driving home  but did not reports panic symptoms- just deep thoughts about where his life is- happened 2 or 3 times    Appetite   Description of Overall Appetite: denied any concerns  - eats healthy  - healthier lifestyle- lost 32lbs in the last year due to diabetes    Trauma    Denies     Abuse History  Denies    Grief / Loss / Adjustment   - mom passed like 2 years     Hallucinations / Delusions   Hallucinations & Delusions Experienced: none, denied    Learning Concerns / Memory   Learning Concerns & Sx: none, denied  Memory Concerns & Sx: none, denied    Functional impairment   - sleep  - Interfere with everything because of the lack of sleep and being tired      Associated Medical Concerns   Potential Associated Factors: None      Comprehensive Behavioral Health History     Medications  Current Mental Health Medications:   None/Unknown  - wants to try natural methods first    Past Mental Health Medications:   None/Unknown    Concerns / challenges / barriers with taking medications? No concerns    Open to medication recommendations from consulting psychiatrist? Yes      Mental Health Treatment History  Mental Health Treatment:   Reason/When/Where/Outcome:   - when I got  2013  - completed therapy  - doesn't recall name - in [facility]     Risk History  Suicidal Thoughts/Method/Intent/Plan: None, denied  Suicide " Attempts/Preparations: None, denied  Number of Suicide Attempts: 0  Access to Firearms/Lethal Means: stored in locked cabinet  Non-Suicidal Self Injury: None, denied  Violence: None, denied  Homicidal Thoughts/Method/Plan/Intent: None, denied      Substance Use History    Substances  Denies  Tried CBD gummies for sleep - worked but could not get anymore  50/50 CBD THC    Family History    Mental Health / Conditions    Family Member Condition / Diagnosis Medications / Side Effects   Sister Depression None/Unknown   Nephew Anxiety disorder None/Unknown               Substance Use  Denies    History of Suicide  Denies        Social History    Housing   Living Situation: Lives alone  Safe Housing Conditions / Feels Safe in Home: Yes    Employment  Current Employment: employed  Current Concerns/Challenges: No  - - [employer]  - job keeps me going    Income   Current Concerns/Challenges: No  Receive Benefits/Assistance: No    Education   Status / Level of Education:       Legal   Legal Considerations: None, denied    Relationships   Parents/Guardian: parents are    Siblings: 2 sisters, 1 brother- closest to twin sister (the nurse); tolerates the other sister, has not spoke with brother since mom passed  Friends: supportive group of friends  Other: no children    Coping / Strengths / Supports   Coping:   running, exercising, meditation, calm ester, music, gets masages   Strengths:  determined, disciplined  Supports: Sister          Assessment Summary  / Plan    Assessment Summary:  What do you want to work on/get out of collaborative care?  Patient would like to learn tools to decrease symptoms. Would rather try skills vs    Plan:   Psych consult - ongoing, bi-weekly, provide psycho-education, and provide appropriate tx referrals    Follow up in about 12 days (around 2024).    Provisional Findings / Impressions  Primary: Patient reports symptoms of anxiety and depression. He states he has been  having trouble falling asleep. Patient has been thinking a lot about his life and comparing to others his age. He is concerned often about what his life will look like in the future. Patient currently uses coping strategies such as exercise and meditation, however he is looking for additional resources and tools to assist with sleep and relaxation.      Goals  - psychoeducation  - cognitive restructuring  - values exploration   - sleep hygiene  - relaxation skills

## 2024-05-30 ENCOUNTER — OFFICE VISIT (OUTPATIENT)
Dept: PRIMARY CARE | Facility: CLINIC | Age: 54
End: 2024-05-30
Payer: COMMERCIAL

## 2024-05-30 VITALS
OXYGEN SATURATION: 98 % | BODY MASS INDEX: 27.87 KG/M2 | HEART RATE: 51 BPM | WEIGHT: 223 LBS | DIASTOLIC BLOOD PRESSURE: 75 MMHG | SYSTOLIC BLOOD PRESSURE: 129 MMHG

## 2024-05-30 DIAGNOSIS — K22.719 BARRETT'S ESOPHAGUS WITH DYSPLASIA: ICD-10-CM

## 2024-05-30 DIAGNOSIS — R73.03 PREDIABETES: ICD-10-CM

## 2024-05-30 DIAGNOSIS — E29.1 HYPOGONADISM IN MALE: ICD-10-CM

## 2024-05-30 DIAGNOSIS — Z12.2 ENCOUNTER FOR SCREENING FOR LUNG CANCER: ICD-10-CM

## 2024-05-30 DIAGNOSIS — Z00.00 HEALTH MAINTENANCE EXAMINATION: Primary | ICD-10-CM

## 2024-05-30 DIAGNOSIS — R40.0 DAYTIME SOMNOLENCE: ICD-10-CM

## 2024-05-30 PROCEDURE — 3048F LDL-C <100 MG/DL: CPT | Performed by: STUDENT IN AN ORGANIZED HEALTH CARE EDUCATION/TRAINING PROGRAM

## 2024-05-30 PROCEDURE — 3078F DIAST BP <80 MM HG: CPT | Performed by: STUDENT IN AN ORGANIZED HEALTH CARE EDUCATION/TRAINING PROGRAM

## 2024-05-30 PROCEDURE — 3044F HG A1C LEVEL LT 7.0%: CPT | Performed by: STUDENT IN AN ORGANIZED HEALTH CARE EDUCATION/TRAINING PROGRAM

## 2024-05-30 PROCEDURE — 99396 PREV VISIT EST AGE 40-64: CPT | Performed by: STUDENT IN AN ORGANIZED HEALTH CARE EDUCATION/TRAINING PROGRAM

## 2024-05-30 PROCEDURE — 1036F TOBACCO NON-USER: CPT | Performed by: STUDENT IN AN ORGANIZED HEALTH CARE EDUCATION/TRAINING PROGRAM

## 2024-05-30 PROCEDURE — 3062F POS MACROALBUMINURIA REV: CPT | Performed by: STUDENT IN AN ORGANIZED HEALTH CARE EDUCATION/TRAINING PROGRAM

## 2024-05-30 PROCEDURE — 3074F SYST BP LT 130 MM HG: CPT | Performed by: STUDENT IN AN ORGANIZED HEALTH CARE EDUCATION/TRAINING PROGRAM

## 2024-05-30 RX ORDER — TESTOSTERONE CYPIONATE 1000 MG/10ML
100 INJECTION, SOLUTION INTRAMUSCULAR
Qty: 6 ML | Refills: 0 | Status: SHIPPED | OUTPATIENT
Start: 2024-05-30 | End: 2024-05-31 | Stop reason: ALTCHOICE
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
## 2024-05-30 NOTE — PATIENT INSTRUCTIONS
New testosterone start  Script sent to pharmacy   100mg once every 2 weeks     Repeat thyroid testing in around 2 weeks    Repeat labs (blood counts, testosterone, and PSA) in 3 months given the new start testosterone    Nutrition referral  Sleep study ordered  CT Lung Cancer screening (in August)   GI Referral (Dr. Hill)   Call [phone] to schedule these    Follow up with me in 3 months!    Dr. ÁNGEL Berg

## 2024-05-31 ENCOUNTER — DOCUMENTATION (OUTPATIENT)
Dept: PRIMARY CARE | Facility: CLINIC | Age: 54
End: 2024-05-31
Payer: COMMERCIAL

## 2024-05-31 DIAGNOSIS — F41.9 ANXIETY: Primary | ICD-10-CM

## 2024-05-31 DIAGNOSIS — E29.1 HYPOGONADISM IN MALE: Primary | ICD-10-CM

## 2024-05-31 PROCEDURE — 99492 1ST PSYC COLLAB CARE MGMT: CPT | Performed by: STUDENT IN AN ORGANIZED HEALTH CARE EDUCATION/TRAINING PROGRAM

## 2024-06-04 ENCOUNTER — SOCIAL WORK (OUTPATIENT)
Dept: PRIMARY CARE | Facility: CLINIC | Age: 54
End: 2024-06-04
Payer: COMMERCIAL

## 2024-06-04 ASSESSMENT — ANXIETY QUESTIONNAIRES
2. NOT BEING ABLE TO STOP OR CONTROL WORRYING: SEVERAL DAYS
1. FEELING NERVOUS, ANXIOUS, OR ON EDGE: SEVERAL DAYS
6. BECOMING EASILY ANNOYED OR IRRITABLE: SEVERAL DAYS
3. WORRYING TOO MUCH ABOUT DIFFERENT THINGS: SEVERAL DAYS
4. TROUBLE RELAXING: SEVERAL DAYS
5. BEING SO RESTLESS THAT IT IS HARD TO SIT STILL: NOT AT ALL
IF YOU CHECKED OFF ANY PROBLEMS ON THIS QUESTIONNAIRE, HOW DIFFICULT HAVE THESE PROBLEMS MADE IT FOR YOU TO DO YOUR WORK, TAKE CARE OF THINGS AT HOME, OR GET ALONG WITH OTHER PEOPLE: SOMEWHAT DIFFICULT
7. FEELING AFRAID AS IF SOMETHING AWFUL MIGHT HAPPEN: NOT AT ALL
GAD7 TOTAL SCORE: 5

## 2024-06-04 ASSESSMENT — PATIENT HEALTH QUESTIONNAIRE - PHQ9
10. IF YOU CHECKED OFF ANY PROBLEMS, HOW DIFFICULT HAVE THESE PROBLEMS MADE IT FOR YOU TO DO YOUR WORK, TAKE CARE OF THINGS AT HOME, OR GET ALONG WITH OTHER PEOPLE: SOMEWHAT DIFFICULT
3. TROUBLE FALLING OR STAYING ASLEEP: SEVERAL DAYS
1. LITTLE INTEREST OR PLEASURE IN DOING THINGS: SEVERAL DAYS
2. FEELING DOWN, DEPRESSED OR HOPELESS: SEVERAL DAYS
7. TROUBLE CONCENTRATING ON THINGS, SUCH AS READING THE NEWSPAPER OR WATCHING TELEVISION: SEVERAL DAYS
SUM OF ALL RESPONSES TO PHQ9 QUESTIONS 1 & 2: 2
6. FEELING BAD ABOUT YOURSELF - OR THAT YOU ARE A FAILURE OR HAVE LET YOURSELF OR YOUR FAMILY DOWN: SEVERAL DAYS
9. THOUGHTS THAT YOU WOULD BE BETTER OFF DEAD, OR OF HURTING YOURSELF: NOT AT ALL
8. MOVING OR SPEAKING SO SLOWLY THAT OTHER PEOPLE COULD HAVE NOTICED. OR THE OPPOSITE, BEING SO FIGETY OR RESTLESS THAT YOU HAVE BEEN MOVING AROUND A LOT MORE THAN USUAL: NOT AT ALL
4. FEELING TIRED OR HAVING LITTLE ENERGY: MORE THAN HALF THE DAYS
5. POOR APPETITE OR OVEREATING: NOT AT ALL
SUM OF ALL RESPONSES TO PHQ QUESTIONS 1-9: 7

## 2024-06-04 NOTE — PROGRESS NOTES
"Collaborative Care (CoCM)  Progress Note    Type of Interaction: In Office    Start Time: 350    End Time: 450        Appointment: Scheduled    Reason for Visit:   Chief Complaint   Patient presents with    Anxiety          Interval History / Patient Symptoms:     Patient Health Questionnaire-9 Score: 7 (6/4/2024  4:59 PM)  ALAN-7 Total Score: 5 (6/4/2024  4:58 PM)        Interventions Provided: Develop Coping Strategies        Response to Intervention:   - last two weeks have been better, thinks it was because he was able to talk about his concerns  - tried categories grounding technique - \"helps to keep mind occupied\"  - using calm ester - teaches self worth, calmness, wants to find a meditation class  - sleep is a \"tiny bit better\"  - continues to workout- went to gym 2x in one day, helps with mood  - had a good week, worked on [email or web address], visited a friend I hadn't seen in a while  - feels like I should have known my parents better, feels like I didn't greive mom properly, why didn't help her more  - values - live in peace and be happy- was angry for a while but wants to be happy   - wants to find a meditation class  - there are things that I want to do that I can't bring myself to do-  letter to mom and going to Confucianist again; go back to school (used to love to learn) at [employer]    Plan: to attend [employer]ianist, list all the things you want to do      Follow Up / Next Appointment: 6/17/2024      "

## 2024-06-10 ENCOUNTER — DOCUMENTATION (OUTPATIENT)
Dept: BEHAVIORAL HEALTH | Facility: CLINIC | Age: 54
End: 2024-06-10
Payer: COMMERCIAL

## 2024-06-10 NOTE — PROGRESS NOTES
[facility] Psychiatry Consult Note     Eliseo Patricio is a 54 y.o., referred to Collaborative Care for symptoms of depression and anxiety. I have reviewed the patient with the behavioral health manager and reviewed the patient's electronic record.    Recommendations:   -- sleep hygiene, relaxation techniques, cognitive restructuring, grief processing  -- consider clonidine for sleep if BP tolerates    Anxiety worsening x 8 months after starting 3rd shift for work; coping-- meditation, yoga, exercise, running, music; initially worried only about sleep but now worries about chores, home projects, being alone as he ages, worries about his future; 2-3 acute anxiety episodes-- heart racing, SOB  Sleep: ok until starting 3rd shift, unable to fall asleep regularly, has been awake for 2 days at a time-- 3 episodes; hydroxyzine ineffective, melatonin and Mg not helpful enough, uses Calm ester 3-4 nights/week, only 4 hrs/night  Mood: denies manic episodes, does feel down at times, experiencing existential crisis, denies SI; excessive guilt about past choices  Subst use: CBD/THC gummies from a friend for sleep      PPH:  Tx: after divorce  Current/past meds: none; pt declines any meds    PMH: hypothyroidism, testosterone deficiency, T2DM,     FHX: sister-- depression; nephew-- anxiety    SHX: ; strong relationship with twin sister;   Work: pipe-fitter; returned to first shift 4 months ago      Patient Health Questionnaire-9 Score: 7 (6/4/2024  4:59 PM)  ALAN-7 Total Score: 5 (6/4/2024  4:58 PM)      The above treatment considerations and suggestions are based on consultations with the patient's care manager and a review of information available in the electronic medical record. I have not personally examined the patient. All recommendations should be implemented with consideration of the patient's relevant prior history and current clinical status. Please feel free to call me with any questions about the care of this patient. I  can easily be reached through Epic inbox, or  email

## 2024-06-17 ENCOUNTER — APPOINTMENT (OUTPATIENT)
Dept: PRIMARY CARE | Facility: CLINIC | Age: 54
End: 2024-06-17
Payer: COMMERCIAL

## 2024-06-17 ASSESSMENT — PATIENT HEALTH QUESTIONNAIRE - PHQ9
7. TROUBLE CONCENTRATING ON THINGS, SUCH AS READING THE NEWSPAPER OR WATCHING TELEVISION: SEVERAL DAYS
1. LITTLE INTEREST OR PLEASURE IN DOING THINGS: NOT AT ALL
SUM OF ALL RESPONSES TO PHQ9 QUESTIONS 1 & 2: 0
5. POOR APPETITE OR OVEREATING: NOT AT ALL
2. FEELING DOWN, DEPRESSED OR HOPELESS: NOT AT ALL
4. FEELING TIRED OR HAVING LITTLE ENERGY: SEVERAL DAYS
9. THOUGHTS THAT YOU WOULD BE BETTER OFF DEAD, OR OF HURTING YOURSELF: NOT AT ALL
3. TROUBLE FALLING OR STAYING ASLEEP: SEVERAL DAYS
8. MOVING OR SPEAKING SO SLOWLY THAT OTHER PEOPLE COULD HAVE NOTICED. OR THE OPPOSITE, BEING SO FIGETY OR RESTLESS THAT YOU HAVE BEEN MOVING AROUND A LOT MORE THAN USUAL: NOT AT ALL
6. FEELING BAD ABOUT YOURSELF - OR THAT YOU ARE A FAILURE OR HAVE LET YOURSELF OR YOUR FAMILY DOWN: SEVERAL DAYS
SUM OF ALL RESPONSES TO PHQ QUESTIONS 1-9: 4

## 2024-06-17 ASSESSMENT — ANXIETY QUESTIONNAIRES
6. BECOMING EASILY ANNOYED OR IRRITABLE: SEVERAL DAYS
1. FEELING NERVOUS, ANXIOUS, OR ON EDGE: SEVERAL DAYS
3. WORRYING TOO MUCH ABOUT DIFFERENT THINGS: SEVERAL DAYS
GAD7 TOTAL SCORE: 5
2. NOT BEING ABLE TO STOP OR CONTROL WORRYING: SEVERAL DAYS
7. FEELING AFRAID AS IF SOMETHING AWFUL MIGHT HAPPEN: NOT AT ALL
5. BEING SO RESTLESS THAT IT IS HARD TO SIT STILL: NOT AT ALL
4. TROUBLE RELAXING: SEVERAL DAYS

## 2024-06-17 NOTE — PROGRESS NOTES
[facility]ative [facility] ([facility])  Progress Note    Type of Interaction: In Office    Start Time: 347    End Time: 510        Appointment: Scheduled    Reason for Visit:   Chief Complaint   Patient presents with    Anxiety          Interval History / Patient Symptoms:     Patient Health Questionnaire-9 Score: 4 (6/17/2024  5:11 PM)  ALAN-7 Total Score: 5 (6/17/2024  5:10 PM)        Interventions Provided: Challenging negative thoughts; psychological flexibility        Response to Intervention:   - completed sleep diary- first week went well, the following week had a couple rough nights of sleeping  - woke up at 1a with anxiety - startled couldn't go back to sleep- wasn't panicky; started to get negative thoughts - unable to challenge them  - negative thoughts: not satisfied with life - wanting to find purpose in her life; health, dying alone, how he feels he failed his mother etc. - perseverates on that which leads to anxiety  - thought about going to [employer] - worked late the previous day and felt bad he didn't go- still has plans to attend  - discussed challenging negative thoughts and psychological flexibility- defusing from thoughts  - introduced worry time       Plan: sleep routine (setting up bedroom for sleep before getting into the shower), continue with sleep diary, practice 2 cognitive defusion techniques, attempt worry time      Follow Up / Next Appointment: 7/3

## 2024-06-18 ENCOUNTER — LAB (OUTPATIENT)
Dept: LAB | Facility: LAB | Age: 54
End: 2024-06-18
Payer: COMMERCIAL

## 2024-06-18 DIAGNOSIS — E03.8 OTHER SPECIFIED HYPOTHYROIDISM: ICD-10-CM

## 2024-06-18 LAB
T4 FREE SERPL-MCNC: 1.32 NG/DL (ref 0.78–1.48)
TSH SERPL-ACNC: 4.49 MIU/L (ref 0.44–3.98)

## 2024-06-18 PROCEDURE — 84439 ASSAY OF FREE THYROXINE: CPT

## 2024-06-18 PROCEDURE — 36415 COLL VENOUS BLD VENIPUNCTURE: CPT

## 2024-06-18 PROCEDURE — 84443 ASSAY THYROID STIM HORMONE: CPT

## 2024-06-28 ENCOUNTER — DOCUMENTATION (OUTPATIENT)
Dept: PRIMARY CARE | Facility: CLINIC | Age: 54
End: 2024-06-28
Payer: COMMERCIAL

## 2024-06-28 DIAGNOSIS — F41.9 ANXIETY: Primary | ICD-10-CM

## 2024-07-05 ENCOUNTER — APPOINTMENT (OUTPATIENT)
Dept: ORTHOPEDIC SURGERY | Facility: CLINIC | Age: 54
End: 2024-07-05
Payer: COMMERCIAL

## 2024-07-05 ENCOUNTER — HOSPITAL ENCOUNTER (OUTPATIENT)
Dept: RADIOLOGY | Facility: EXTERNAL LOCATION | Age: 54
Discharge: HOME | End: 2024-07-05

## 2024-07-05 ENCOUNTER — HOSPITAL ENCOUNTER (OUTPATIENT)
Dept: RADIOLOGY | Facility: CLINIC | Age: 54
Discharge: HOME | End: 2024-07-05
Payer: COMMERCIAL

## 2024-07-05 VITALS — WEIGHT: 230 LBS | BODY MASS INDEX: 29.52 KG/M2 | HEIGHT: 74 IN

## 2024-07-05 DIAGNOSIS — M25.521 RIGHT ELBOW PAIN: ICD-10-CM

## 2024-07-05 DIAGNOSIS — M77.01 MEDIAL EPICONDYLITIS OF RIGHT ELBOW: Primary | ICD-10-CM

## 2024-07-05 PROCEDURE — 73080 X-RAY EXAM OF ELBOW: CPT | Mod: RT

## 2024-07-05 PROCEDURE — 3044F HG A1C LEVEL LT 7.0%: CPT | Performed by: EMERGENCY MEDICINE

## 2024-07-05 PROCEDURE — 3048F LDL-C <100 MG/DL: CPT | Performed by: EMERGENCY MEDICINE

## 2024-07-05 PROCEDURE — 3062F POS MACROALBUMINURIA REV: CPT | Performed by: EMERGENCY MEDICINE

## 2024-07-05 PROCEDURE — 99214 OFFICE O/P EST MOD 30 MIN: CPT | Performed by: EMERGENCY MEDICINE

## 2024-07-05 ASSESSMENT — PAIN SCALES - GENERAL: PAINLEVEL: 8

## 2024-07-05 NOTE — PROGRESS NOTES
"Subjective   Eliseo Patricio is a 54 y.o. male who presents for Pain of the Right Elbow    HPI  54-year-old right-hand-dominant male previously known to me is presenting with a new complaint today.  He presents with complaint of right medial elbow pain that has had for the past 3 months.  He noticed this pain with increased activities and lifting.  He does feel that this pain is somewhat similar to the left lateral elbow pain that he previously had that has improved with hand therapy.  He has tried stretching exercises, Voltaren gel, and a compression sleeve with limited relief.  He has noticed worsening weakness likely secondary to pain.  He denies acute trauma, deformity, ecchymosis, rash, erythema, or fevers.    ROS: All pertinent positive symptoms are included in the history of present illness.    All other systems have been reviewed and are negative and noncontributory to this patient's current ailments.    Objective     Vitals:    07/05/24 1420   Weight: 104 kg (230 lb)   Height: 1.88 m (6' 2\")       Physical Exam  General/Constitutional: No apparent distress. Well-nourished and well developed.  Head: Normocephalic, Atraumatic.   Eyes: EOMI.  Vascular: No edema, swelling or tenderness, except as noted in detailed exam.  Respiratory: Non-labored breathing.  Integumentary: No impressive skin lesions present, except as noted in detailed exam.  Neurological: Alert and oriented.  Psychological:  Normal mood and affect.  Musculoskeletal: Normal, except as noted in detailed exam.  Right elbow: No rash.  No deformity.  No erythema.  Tenderness over the medial epicondyle.  Tenderness over the common flexor tendon and pronator teres.  Pain with resisted wrist flexion and pronation.  Full elbow active range of motion total planes.  Negative varus stress.  Negative valgus stress.  Gross sensation intact throughout.    Imaging:   Radiographs:   Right elbow AP, lateral, internal oblique, external oblique: No acute fractures " or dislocations.  No significant joint effusion present.  No significant bony abnormalities present.      Assessment/Plan   Problem List Items Addressed This Visit    None  Visit Diagnoses       Medial epicondylitis of right elbow    -  Primary    Relevant Orders    MR elbow left wo IV contrast    Referral to Occupational Therapy    Right elbow pain        Relevant Orders    XR elbow right 3+ views          I discussed with patient I feel symptoms are likely due to medial epicondylitis and early development of common flexor tendinopathy.  Radiographs are reassuring.  I do feel that he would benefit from a course of hand therapy.  Therefore, I have given him a referral for this.  He can continue using Voltaren gel 3-4 times daily for pain control.  I did order an MRI of his elbow should he not improve.  I would like to see him back for these results.  Will likely then discuss the option of percutaneous tenotomy.    Osmar Griffin,   Sports Medicine  [facility], [facility] Sports Medicine [place]    ** Please excuse any errors in grammar or translation related to this dictation. Voice recognition software was utilized to prepare this document. **

## 2024-07-18 ENCOUNTER — TELEPHONE (OUTPATIENT)
Dept: PRIMARY CARE | Facility: CLINIC | Age: 54
End: 2024-07-18
Payer: COMMERCIAL

## 2024-07-18 NOTE — PROGRESS NOTES
Left message for patient - calling to inquire about rescheduling CoCM appointment. Contact information left for return call.

## 2024-07-25 ENCOUNTER — APPOINTMENT (OUTPATIENT)
Dept: PRIMARY CARE | Facility: CLINIC | Age: 54
End: 2024-07-25
Payer: COMMERCIAL

## 2024-07-31 ENCOUNTER — APPOINTMENT (OUTPATIENT)
Dept: OCCUPATIONAL THERAPY | Facility: CLINIC | Age: 54
End: 2024-07-31
Payer: COMMERCIAL

## 2024-08-06 ENCOUNTER — HOSPITAL ENCOUNTER (OUTPATIENT)
Dept: RADIOLOGY | Facility: CLINIC | Age: 54
Discharge: HOME | End: 2024-08-06
Payer: COMMERCIAL

## 2024-08-06 DIAGNOSIS — Z12.2 ENCOUNTER FOR SCREENING FOR LUNG CANCER: ICD-10-CM

## 2024-08-08 ENCOUNTER — EVALUATION (OUTPATIENT)
Dept: OCCUPATIONAL THERAPY | Facility: CLINIC | Age: 54
End: 2024-08-08
Payer: COMMERCIAL

## 2024-08-08 DIAGNOSIS — M77.01 MEDIAL EPICONDYLITIS OF RIGHT ELBOW: Primary | ICD-10-CM

## 2024-08-08 DIAGNOSIS — M25.521 ELBOW PAIN, RIGHT: ICD-10-CM

## 2024-08-08 PROCEDURE — 97110 THERAPEUTIC EXERCISES: CPT | Mod: GO | Performed by: OCCUPATIONAL THERAPIST

## 2024-08-08 PROCEDURE — 97166 OT EVAL MOD COMPLEX 45 MIN: CPT | Mod: GO | Performed by: OCCUPATIONAL THERAPIST

## 2024-08-08 ASSESSMENT — PATIENT HEALTH QUESTIONNAIRE - PHQ9
SUM OF ALL RESPONSES TO PHQ9 QUESTIONS 1 AND 2: 0
2. FEELING DOWN, DEPRESSED OR HOPELESS: NOT AT ALL
1. LITTLE INTEREST OR PLEASURE IN DOING THINGS: NOT AT ALL

## 2024-08-08 ASSESSMENT — PAIN DESCRIPTION - DESCRIPTORS: DESCRIPTORS: BURNING;ACHING;SORE

## 2024-08-08 ASSESSMENT — PAIN SCALES - GENERAL: PAINLEVEL_OUTOF10: 4

## 2024-08-08 ASSESSMENT — PAIN - FUNCTIONAL ASSESSMENT: PAIN_FUNCTIONAL_ASSESSMENT: 0-10

## 2024-08-08 NOTE — PROGRESS NOTES
Occupational Therapy   Upper Extremity Evaluation Note    Patient Name: Eliseo Patricio  MRN: [id number]  Referring Physician: Tonny Griffin DO  Allergies:   PCN Date of Injury: 3/1/24  Mechanism of Injury:  Progressive Use  Date of Surgery:NA  Precautions: NA         Current Problem  1. Medial epicondylitis of right elbow  Referral to Occupational Therapy    Follow Up In Occupational Therapy      2. Elbow pain, right         Insurance    Visit number: 1/6  *M77.01 (ICD-10-CM) - Medial epicondylitis of right elbow  MMO [employer] 40V ( 39 REMAIN OT ) COPAY 20  (MET) 600(200) COVERAGE 90 OOP [phone]  NO AUTH REQ    GENERAL  General  General Comment: Visit 1/6     Subjective  Patient's Chief Concern: to use arm for work and gym workouts without pain  Patient would like to functionally improve: pain free grasp on weights  Improvements: -    Pain  Pain Assessment  Pain Assessment: 0-10  0-10 (Numeric) Pain Score: 4  Pain Descriptors: Burning, Aching, Sore      Objective  Hand Dominance: RIGHT       Affected Extremity:   Right.   PMHX: Left Medial Epicondylitis. Cared for at [facility]. Mostly recovered, interventions included STM, US, and stretches.     Outcome Measures:   Other Outcome Measures: 18    ADLS/IADLS: IND    Skin/ Wound/Scar: NA    Sensation: Monitor Burning right medial forearm; presently, intermittent.     Dexterity: Monitor    Special Tests: N/A     Right Golfers Elbow Test: Positive, with burning          ROM and STRENGTH    B traps tight, elbow motion especially elbow extension accompanied by shoulder hike.       AROM  Right Left   Elbow Extension/Flexion WNL WNL         Forearm  Pronation/Supination WNL WNL         Wrist Extension/Flexion WNL WNL    Radial Deviation/Ulnar Deviation WNL WNL     Hand ROM  THUMB AROM      Eloisaandji 9/10   AROM B hands WNL    Hand Strength  DEFERRED   Newton Dynamometer    Gross Grasp (lbs)  Right Left   2nd setting - -       Pinch Strength Right Left    Lateral - -   Tripod - -   Tip  - -       TREATMENT:  Kinesiology Tape right medial epicondylitis  Percussion gun B traps  Door jam Pec Stretches  Row and Lat Pull Down Cables  Chin Tuck  H/N stretches  -scalene  -SCM    HEP and Patient Education:    -See treatment section above.   -Educated patient to diagnosis and rehab expectations including frequency and duration of services.     ASSESSMENT:  Evaluation Data: Patient is a 55 yo RHDM  s/p Right elbow pain with burning, medial epicondyliti resulting in limited participation in pain-free ADLs and inability to perform at their prior level of function. Skilled Occupational Therapy services are warranted to address the impairments found & listed previously in the objective section in order to return to safe and pain-free ADLs and prior level of function   Home living/Social Support: Resides home, independently  Occupation:  , commercial  Medical Leave/Modified Duty: NO  Meaningful Activities:  Music, workouts  Prior Level of Function Per Patient/Caregiver Report: Independent with ADL, IADL, work and leisure activities  Patient would like to gain recovery of their RIGHT arm function to improve their level of independence with ADLs, IADLs.  PLAN OF CARE:   Plan      Follow Up with Referring Physician: per recommendations  Monitor home program.    Patient instructed to call or email with questions or concerns.    Frequency: 1x/week  Duration: 6 weeks  Comprehensive reassessments will be completed intermittently.   Potential to achieve rehab goals is good . Patient would benefit from skilled Occupational Therapy services to address deficits and promote functional gains and return to timely completion of self care demands and IADL's.       GOALS:   Patient to demonstrate, with involved extremity (ies):      -good carry through with progressive soft tissue management techniques facilitating gains with motion and pain reduction.   -wrist AROM CHOPRA 120 degrees, to  tolerate four point position during cleaning and shifting body position(s).  -independence opening packages, Cunningham Pinch 18#  -independence opening jars and turning door knobs,  Strength 80#  -good skill with progressive orthosis regimen, applying orthosis correctly and using according to medically necessary wear schedule as prescribed by therapist    Patient verbalized good understanding of Therapy Plan of Care and is in a agreement with Occupational Therapy Goals.     Problems To Be Addressed: Knowledge of precautions, knowledge of HEP, pain and edema management, ROM/joint mobility, coordination, motor skills, strength recovery, endurance recovery, orthosis use, wear/care, precautions.    Planned Interventions include: wound care as needed, patient education/instruction, home program instruction and review, edema control, manual therapy, motor coordination, therapeutic exercise, therapeutic activities, ADL and IADL retraining, neuromuscular re-education, dry needling, NMES, Fluidotherapy, ultrasound, Kinesiotaping, orthosis fabrication/fit training.    CHART REVIEW: YES    Time Calculation Time Calculation  Start Time: 0215  Stop Time: 0315  Time Calculation (min): 60 min   EVAL     OT Evaluation (Moderate) Time Entry: 30     MODALITIES           TIME  MT    NMR    TE Therapeutic Exercise Time Entry: 30  TA    SCA    ORTH TRAIN    ORTH SUB    LCODE  GRP

## 2024-08-19 ENCOUNTER — APPOINTMENT (OUTPATIENT)
Dept: GASTROENTEROLOGY | Facility: CLINIC | Age: 54
End: 2024-08-19
Payer: COMMERCIAL

## 2024-08-19 VITALS — HEIGHT: 74 IN | WEIGHT: 230 LBS | OXYGEN SATURATION: 96 % | HEART RATE: 50 BPM | BODY MASS INDEX: 29.52 KG/M2

## 2024-08-19 DIAGNOSIS — Z12.11 COLON CANCER SCREENING: ICD-10-CM

## 2024-08-19 DIAGNOSIS — Z86.010 HISTORY OF COLONIC POLYPS: ICD-10-CM

## 2024-08-19 DIAGNOSIS — K22.70 BARRETT'S ESOPHAGUS WITHOUT DYSPLASIA: Primary | ICD-10-CM

## 2024-08-19 PROBLEM — Z86.0100 HISTORY OF COLONIC POLYPS: Status: ACTIVE | Noted: 2024-08-19

## 2024-08-19 PROCEDURE — 99204 OFFICE O/P NEW MOD 45 MIN: CPT | Performed by: INTERNAL MEDICINE

## 2024-08-19 PROCEDURE — 3008F BODY MASS INDEX DOCD: CPT | Performed by: INTERNAL MEDICINE

## 2024-08-19 PROCEDURE — 1036F TOBACCO NON-USER: CPT | Performed by: INTERNAL MEDICINE

## 2024-08-19 PROCEDURE — 3048F LDL-C <100 MG/DL: CPT | Performed by: INTERNAL MEDICINE

## 2024-08-19 PROCEDURE — 3044F HG A1C LEVEL LT 7.0%: CPT | Performed by: INTERNAL MEDICINE

## 2024-08-19 PROCEDURE — 3062F POS MACROALBUMINURIA REV: CPT | Performed by: INTERNAL MEDICINE

## 2024-08-19 RX ORDER — SODIUM PICOSULFATE, MAGNESIUM OXIDE, AND ANHYDROUS CITRIC ACID 12; 3.5; 1 G/175ML; G/175ML; MG/175ML
LIQUID ORAL
Qty: 350 ML | Refills: 0 | Status: SHIPPED | OUTPATIENT
Start: 2024-08-19

## 2024-08-19 ASSESSMENT — ENCOUNTER SYMPTOMS
BRUISES/BLEEDS EASILY: 0
UNEXPECTED WEIGHT CHANGE: 0
COUGH: 0
ARTHRALGIAS: 0
FATIGUE: 0
SORE THROAT: 0
FEVER: 0
ROS GI COMMENTS: AS PER HPI
DYSURIA: 0
SHORTNESS OF BREATH: 0
WEAKNESS: 0
ADENOPATHY: 0
CONFUSION: 0
MYALGIAS: 0
CHILLS: 0
DIZZINESS: 0
NERVOUS/ANXIOUS: 0

## 2024-08-19 NOTE — ASSESSMENT & PLAN NOTE
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  He is overdue for surveillance EGD.  He has a history of acid reflux and was on omeprazole at one time, however his reflux symptoms have improved dramatically after quitting smoking and making other diet changes, and he stopped his omeprazole.  He asked if he needs to restart the omeprazole, and we discussed that we will see how his esophagus looks on the EGD and make a determination at that time.  EGD will be done concurrently with colonoscopy.

## 2024-08-19 NOTE — LETTER
August 19, 2024     Faizan Purvis MD  [address]  [facility], [facility] [address]  [facility] [address]    Patient: Eliseo Patricio   YOB: 1970   Date of Visit: 8/19/2024       Dear Dr. Faizan Purvis MD:    I saw Eliseo Patricio today for evaluation. Below are the relevant portions of my assessment and plan of care.    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  He is overdue for surveillance EGD.  He has a history of acid reflux and was on omeprazole at one time, however his reflux symptoms have improved dramatically after quitting smoking and making other diet changes, and he stopped his omeprazole.  He asked if he needs to restart the omeprazole, and we discussed that we will see how his esophagus looks on the EGD and make a determination at that time.  EGD will be done concurrently with colonoscopy.    History of colonic polyps  Colonoscopy in 2017 did show one adenoma and a 5-year repeat was recommended.  He is overdue for surveillance.  Will schedule for surveillance colonoscopy (concurrently with EGD).  Discussed prep and procedure in detail, provided handout with instructions.  He can follow up PRN after the procedures.     If you have questions, please do not hesitate to reach out to me.       Sincerely,      Gonzalo Herman MD

## 2024-08-19 NOTE — PATIENT INSTRUCTIONS
We will schedule you for an upper endoscopy (EGD) and colonoscopy.  Follow all of the bowel preparation instructions closely, and feel free to call the office with any questions or concerns prior to the procedures.

## 2024-08-19 NOTE — ASSESSMENT & PLAN NOTE
Colonoscopy in 2017 did show one adenoma and a 5-year repeat was recommended.  He is overdue for surveillance.  Will schedule for surveillance colonoscopy (concurrently with EGD).  Discussed prep and procedure in detail, provided handout with instructions.  He can follow up PRN after the procedures.

## 2024-08-19 NOTE — PROGRESS NOTES
Assessment/Plan    Eliseo Patricio is a 54 y.o. male with PMHx of hypothyroidism, TOB use, anxiety, depression who presents to GI clinic for evaluation prior to EGD and colonoscopy.    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  He is overdue for surveillance EGD.  He has a history of acid reflux and was on omeprazole at one time, however his reflux symptoms have improved dramatically after quitting smoking and making other diet changes, and he stopped his omeprazole.  He asked if he needs to restart the omeprazole, and we discussed that we will see how his esophagus looks on the EGD and make a determination at that time.  EGD will be done concurrently with colonoscopy.    History of colonic polyps  Colonoscopy in 2017 did show one adenoma and a 5-year repeat was recommended.  He is overdue for surveillance.  Will schedule for surveillance colonoscopy (concurrently with EGD).  Discussed prep and procedure in detail, provided handout with instructions.  He can follow up PRN after the procedures.         Subjective     History of Present Illness   Eliseo Patricio is a 54 y.o. male with PMHx of hypothyroidism, TOB use, anxiety, depression who presents to GI clinic for further evaluation prior to EGD/colonoscopy.  He was told by his PCP that he is overdue for EGD and colonoscopy  Only occasional heartburn  Quit smoking a year ago, changed diet, and symptoms are much better  Has quit taking his omeprazole regularly  Has history of constipation, however this has also improved with his diet/lifestyle changes  No unexplained weight loss    Chart review:  EGD/Colonoscopy 9/2017 ([place]) - EGD with short-segment BE, colon with one adenoma and internal hemorrhoids, recommended 5-year follow up    Past Medical History  As per HPI.     Social History  he  reports that he has quit smoking. His smoking use included cigarettes. He has never been exposed to tobacco smoke. He  "has never used smokeless tobacco. He reports that he does not currently use alcohol. He reports that he does not currently use drugs.     Family History  his family history is not on file.   Sister had polyps, no colon cancer in the family    Review of Systems  Review of Systems   Constitutional:  Negative for chills, fatigue, fever and unexpected weight change.   HENT:  Negative for sore throat.    Eyes:  Negative for visual disturbance.   Respiratory:  Negative for cough and shortness of breath.    Cardiovascular:  Negative for chest pain.   Gastrointestinal:         As per HPI   Genitourinary:  Negative for dysuria.   Musculoskeletal:  Negative for arthralgias and myalgias.   Skin:  Negative for rash.   Neurological:  Negative for dizziness, syncope and weakness.   Hematological:  Negative for adenopathy. Does not bruise/bleed easily.   Psychiatric/Behavioral:  Negative for confusion. The patient is not nervous/anxious.        Allergies  Allergies   Allergen Reactions    Penicillins Hives       Medications  Current Outpatient Medications   Medication Instructions    ascorbic acid (Vitamin C) 1,000 mg tablet 1 tablet, oral, Daily    fluticasone (Flonase) 50 mcg/actuation nasal spray 2 sprays, Each Nostril, Daily    levothyroxine (SYNTHROID, LEVOXYL) 125 mcg, oral, Daily    sod picosulf-mag ox-citric ac (Clenpiq) 10 mg-3.5 gram- 12 gram/175 mL solution Take one bottle twice as directed by the prep instructions    testosterone cypionate 100 mg, intramuscular, Every 14 days        Objective     Visit Vitals  Pulse 50   Ht 1.88 m (6' 2\")   Wt 104 kg (230 lb)   SpO2 96%   BMI 29.53 kg/m²   Smoking Status Former   BSA 2.33 m²       Physical Exam  Constitutional:       Appearance: Normal appearance.   HENT:      Mouth/Throat:      Mouth: Mucous membranes are moist.   Eyes:      Extraocular Movements: Extraocular movements intact.   Cardiovascular:      Rate and Rhythm: Regular rhythm. Bradycardia present.   Pulmonary:    "   Breath sounds: Normal breath sounds.   Abdominal:      General: Bowel sounds are normal. There is no distension.      Palpations: Abdomen is soft.      Tenderness: There is no abdominal tenderness. There is no guarding or rebound.   Musculoskeletal:         General: No swelling.   Skin:     General: Skin is warm and dry.   Neurological:      General: No focal deficit present.      Mental Status: He is alert.   Psychiatric:         Mood and Affect: Mood normal.         Behavior: Behavior normal.           Lab Results   Component Value Date    WBC 8.2 05/02/2024    WBC 8.7 05/01/2023    HGB 15.3 05/02/2024    HGB 16.6 05/01/2023    HCT 47.7 05/02/2024    HCT 47.9 05/01/2023    MCV 94 05/02/2024    MCV 93 05/01/2023     05/02/2024     05/01/2023     Lab Results   Component Value Date     05/02/2024     05/01/2023    K 4.3 05/02/2024    K 3.8 05/01/2023     05/02/2024     05/01/2023    CO2 29 05/02/2024    CO2 29 05/01/2023    BUN 24 (H) 05/02/2024    BUN 18 05/01/2023    CREATININE 1.06 05/02/2024    CREATININE 0.99 05/01/2023    CALCIUM 9.1 05/02/2024    CALCIUM 9.5 05/01/2023    PROT 6.7 05/02/2024    PROT 7.0 05/01/2023    BILITOT 0.7 05/02/2024    BILITOT 0.6 05/01/2023    ALKPHOS 63 05/02/2024    ALKPHOS 80 05/01/2023    ALT 30 05/02/2024    ALT 21 05/01/2023    AST 30 05/02/2024    AST 16 05/01/2023    GLUCOSE 124 (H) 05/02/2024    GLUCOSE 137 (H) 05/01/2023       Recent Imaging  No results found.      Gonzalo Herman MD

## 2024-08-26 ENCOUNTER — LAB (OUTPATIENT)
Dept: LAB | Facility: LAB | Age: 54
End: 2024-08-26
Payer: COMMERCIAL

## 2024-08-26 DIAGNOSIS — E29.1 HYPOGONADISM IN MALE: ICD-10-CM

## 2024-08-26 LAB
ERYTHROCYTE [DISTWIDTH] IN BLOOD BY AUTOMATED COUNT: 13 % (ref 11.5–14.5)
HCT VFR BLD AUTO: 45.1 % (ref 41–52)
HGB BLD-MCNC: 15.1 G/DL (ref 13.5–17.5)
MCH RBC QN AUTO: 31.5 PG (ref 26–34)
MCHC RBC AUTO-ENTMCNC: 33.5 G/DL (ref 32–36)
MCV RBC AUTO: 94 FL (ref 80–100)
NRBC BLD-RTO: 0 /100 WBCS (ref 0–0)
PLATELET # BLD AUTO: 248 X10*3/UL (ref 150–450)
RBC # BLD AUTO: 4.8 X10*6/UL (ref 4.5–5.9)
WBC # BLD AUTO: 8 X10*3/UL (ref 4.4–11.3)

## 2024-08-26 PROCEDURE — 85027 COMPLETE CBC AUTOMATED: CPT

## 2024-08-26 PROCEDURE — 84402 ASSAY OF FREE TESTOSTERONE: CPT

## 2024-08-26 PROCEDURE — 84153 ASSAY OF PSA TOTAL: CPT

## 2024-08-26 PROCEDURE — 84154 ASSAY OF PSA FREE: CPT

## 2024-08-26 PROCEDURE — 36415 COLL VENOUS BLD VENIPUNCTURE: CPT

## 2024-08-27 ENCOUNTER — APPOINTMENT (OUTPATIENT)
Dept: OCCUPATIONAL THERAPY | Facility: CLINIC | Age: 54
End: 2024-08-27
Payer: COMMERCIAL

## 2024-08-27 LAB
PSA FREE MFR SERPL: 33 %
PSA FREE SERPL-MCNC: 0.2 NG/ML
PSA SERPL IA-MCNC: 0.6 NG/ML (ref 0–4)

## 2024-08-29 LAB
TESTOSTERONE FREE (CHAN): 56.4 PG/ML (ref 35–155)
TESTOSTERONE,TOTAL,LC-MS/MS: 285 NG/DL (ref 250–1100)

## 2024-09-03 ENCOUNTER — APPOINTMENT (OUTPATIENT)
Dept: OCCUPATIONAL THERAPY | Facility: CLINIC | Age: 54
End: 2024-09-03
Payer: COMMERCIAL

## 2024-09-04 ENCOUNTER — TELEPHONE (OUTPATIENT)
Dept: PRIMARY CARE | Facility: CLINIC | Age: 54
End: 2024-09-04

## 2024-09-04 ENCOUNTER — APPOINTMENT (OUTPATIENT)
Dept: PRIMARY CARE | Facility: CLINIC | Age: 54
End: 2024-09-04
Payer: COMMERCIAL

## 2024-09-04 VITALS
DIASTOLIC BLOOD PRESSURE: 75 MMHG | HEART RATE: 55 BPM | SYSTOLIC BLOOD PRESSURE: 131 MMHG | OXYGEN SATURATION: 95 % | BODY MASS INDEX: 29.39 KG/M2 | WEIGHT: 229 LBS | HEIGHT: 74 IN

## 2024-09-04 DIAGNOSIS — R40.0 DAYTIME SOMNOLENCE: ICD-10-CM

## 2024-09-04 DIAGNOSIS — E29.1 HYPOGONADISM IN MALE: ICD-10-CM

## 2024-09-04 DIAGNOSIS — E03.9 HYPOTHYROIDISM, UNSPECIFIED TYPE: ICD-10-CM

## 2024-09-04 DIAGNOSIS — E29.1 HYPOGONADISM IN MALE: Primary | ICD-10-CM

## 2024-09-04 DIAGNOSIS — Z12.2 ENCOUNTER FOR SCREENING FOR LUNG CANCER: ICD-10-CM

## 2024-09-04 DIAGNOSIS — R79.89 ELEVATED TSH: ICD-10-CM

## 2024-09-04 PROCEDURE — 3008F BODY MASS INDEX DOCD: CPT | Performed by: STUDENT IN AN ORGANIZED HEALTH CARE EDUCATION/TRAINING PROGRAM

## 2024-09-04 PROCEDURE — 3062F POS MACROALBUMINURIA REV: CPT | Performed by: STUDENT IN AN ORGANIZED HEALTH CARE EDUCATION/TRAINING PROGRAM

## 2024-09-04 PROCEDURE — 3078F DIAST BP <80 MM HG: CPT | Performed by: STUDENT IN AN ORGANIZED HEALTH CARE EDUCATION/TRAINING PROGRAM

## 2024-09-04 PROCEDURE — 3075F SYST BP GE 130 - 139MM HG: CPT | Performed by: STUDENT IN AN ORGANIZED HEALTH CARE EDUCATION/TRAINING PROGRAM

## 2024-09-04 PROCEDURE — 3048F LDL-C <100 MG/DL: CPT | Performed by: STUDENT IN AN ORGANIZED HEALTH CARE EDUCATION/TRAINING PROGRAM

## 2024-09-04 PROCEDURE — 99214 OFFICE O/P EST MOD 30 MIN: CPT | Performed by: STUDENT IN AN ORGANIZED HEALTH CARE EDUCATION/TRAINING PROGRAM

## 2024-09-04 PROCEDURE — 3044F HG A1C LEVEL LT 7.0%: CPT | Performed by: STUDENT IN AN ORGANIZED HEALTH CARE EDUCATION/TRAINING PROGRAM
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
## 2024-09-04 NOTE — TELEPHONE ENCOUNTER
[facility] wants to know if you wanted the pt to just have 2 months worth of testosterone because he only has 2 refills.

## 2024-09-04 NOTE — PATIENT INSTRUCTIONS
Increase dose of Testosterone to 200    Repeat labs in 2 months  Get done at the MIDWAY point between injections    Flu and COVID booster this fall!    Follow up in 3 months!    Dr. NEAL

## 2024-09-05 ENCOUNTER — TREATMENT (OUTPATIENT)
Dept: OCCUPATIONAL THERAPY | Facility: CLINIC | Age: 54
End: 2024-09-05
Payer: COMMERCIAL

## 2024-09-05 DIAGNOSIS — E29.1 HYPOGONADISM IN MALE: ICD-10-CM

## 2024-09-05 DIAGNOSIS — E03.9 HYPOTHYROIDISM, UNSPECIFIED TYPE: Primary | ICD-10-CM

## 2024-09-05 DIAGNOSIS — M77.01 MEDIAL EPICONDYLITIS OF RIGHT ELBOW: ICD-10-CM

## 2024-09-05 PROCEDURE — 97140 MANUAL THERAPY 1/> REGIONS: CPT | Mod: GO | Performed by: OCCUPATIONAL THERAPIST

## 2024-09-05 RX ORDER — TESTOSTERONE CYPIONATE 200 MG/ML
200 INJECTION, SOLUTION INTRAMUSCULAR
Qty: 5 ML | Refills: 1 | Status: SHIPPED | OUTPATIENT
Start: 2024-09-05 | End: 2025-01-23

## 2024-09-05 ASSESSMENT — PAIN DESCRIPTION - DESCRIPTORS: DESCRIPTORS: BURNING;ACHING;SORE

## 2024-09-05 ASSESSMENT — PAIN SCALES - GENERAL: PAINLEVEL_OUTOF10: 5 - MODERATE PAIN

## 2024-09-05 ASSESSMENT — PAIN - FUNCTIONAL ASSESSMENT: PAIN_FUNCTIONAL_ASSESSMENT: 0-10

## 2024-09-05 NOTE — PROGRESS NOTES
"    Occupational Therapy   Upper Extremity Progress Note    Patient Name: Eliseo Patricio  MRN: [id number]  Referring Physician: Tonny Griffin DO  Allergies:   PCN Date of Injury: 3/1/24  Mechanism of Injury:  Progressive Use  Date of Surgery:NA  Precautions: NA         Current Problem  1. Medial epicondylitis of right elbow  Follow Up In Occupational Therapy       Insurance    Visit number: 2/6  *M77.01 (ICD-10-CM) - Medial epicondylitis of right elbow  MMO SU[employer] 40V ( 39 REMAIN OT ) COPAY 20  (MET) 600(200) COVERAGE 90 OOP [phone]  NO AUTH REQ    GENERAL  General  General Comment: OT Visit 2/6     Subjective  \"It's feeling so much better, the tape and exercises really helped.   Mostly hurts when I over do it at work.\"    Patient's Chief Concern: to use arm for work and gym workouts without pain  Patient would like to functionally improve: pain free grasp on weights  Improvements: -    Pain  Pain Assessment  Pain Assessment: 0-10  0-10 (Numeric) Pain Score: 5 - Moderate pain  Pain Descriptors: Burning, Aching, Sore      Objective  Hand Dominance: RIGHT       Affected Extremity:   Right.   PMHX: Left Medial Epicondylitis. Cared for at [facility]. Mostly recovered, interventions included STM, US, and stretches.     Outcome Measures:        ADLS/IADLS: IND    Skin/ Wound/Scar: NA    Sensation: Monitor Burning right medial forearm; presently, intermittent.     Dexterity: Monitor    Special Tests: N/A     Right Golfers Elbow Test: Positive, with burning          ROM and STRENGTH    B traps tight, elbow motion especially elbow extension accompanied by shoulder hike.       AROM  Right Left   Elbow Extension/Flexion WNL WNL         Forearm  Pronation/Supination WNL WNL         Wrist Extension/Flexion WNL WNL    Radial Deviation/Ulnar Deviation WNL WNL     Hand ROM  THUMB AROM      Eloisaandji 9/10   AROM B hands WNL    Hand Strength  DEFERRED   Newton Dynamometer    Gross Grasp (lbs)  Right Left   2nd " setting - -       Pinch Strength Right Left   Lateral - -   Tripod - -   Tip  - -       TREATMENT:  Kinesiology Tape right medial epicondylitis  Dry needling performed this date to   Right Medial epicondyle  of RUE. 0.18*15 mm and 0.14*15 mm needles were inserted.     Rotatory technique and stim were utilized to further improve soft tissue dysfunction.     8 needles were removed.  Area inspected for adverse symptoms, none noted.  Patient educated in post- dry needling management and expected symptoms from treatment.   All patient questions answered.    IASTM using Graston tools and manual Soft Tissue Mobilization completed.    Kinesiotape placed at  target tissue for support, mobilization, and comfort.   50% stretch placed on tissues.   Reviewed:   Door jam Pec Stretches  Row and Lat Pull Down Cables  Chin Tuck  H/N stretches  -scalene  -SCM    HEP and Patient Education:    -See treatment section above.     ASSESSMENT:  Patient able to resist clinician testing as well as 5# hand weight at target tissues without pain flare. Weakness noted at M-T junction.     Evaluation Data: Patient is a 55 yo RHDM  s/p Right elbow pain with burning, medial epicondyliti resulting in limited participation in pain-free ADLs and inability to perform at their prior level of function. Skilled Occupational Therapy services are warranted to address the impairments found & listed previously in the objective section in order to return to safe and pain-free ADLs and prior level of function   Home living/Social Support: Resides home, independently  Occupation:  , commercial  Medical Leave/Modified Duty: NO  Meaningful Activities:  Music, workouts  Prior Level of Function Per Patient/Caregiver Report: Independent with ADL, IADL, work and leisure activities  Patient would like to gain recovery of their RIGHT arm function to improve their level of independence with ADLs, IADLs.  PLAN OF CARE:   Plan      Follow Up with Referring Physician:  per recommendations  Monitor home program.    Patient instructed to call or email with questions or concerns.    Frequency: 1x/week  Duration: 6 weeks  Comprehensive reassessments will be completed intermittently.   Potential to achieve rehab goals is good . Patient would benefit from skilled Occupational Therapy services to address deficits and promote functional gains and return to timely completion of self care demands and IADL's.       GOALS:   Patient to demonstrate, with involved extremity (ies):      -good carry through with progressive soft tissue management techniques facilitating gains with motion and pain reduction.   -wrist AROM CHOPRA 120 degrees, to tolerate four point position during cleaning and shifting body position(s).  -independence opening packages, Cunningham Pinch 18#  -independence opening jars and turning door knobs,  Strength 80#  -good skill with progressive orthosis regimen, applying orthosis correctly and using according to medically necessary wear schedule as prescribed by therapist    Patient verbalized good understanding of Therapy Plan of Care and is in a agreement with Occupational Therapy Goals.     Problems To Be Addressed: Knowledge of precautions, knowledge of HEP, pain and edema management, ROM/joint mobility, coordination, motor skills, strength recovery, endurance recovery, orthosis use, wear/care, precautions.    Planned Interventions include: wound care as needed, patient education/instruction, home program instruction and review, edema control, manual therapy, motor coordination, therapeutic exercise, therapeutic activities, ADL and IADL retraining, neuromuscular re-education, dry needling, NMES, Fluidotherapy, ultrasound, Kinesiotaping, orthosis fabrication/fit training.    CHART REVIEW: YES    Time Calculation Time Calculation  Start Time: 0430  Stop Time: 0515  Time Calculation (min): 45 min   EVAL           MODALITIES           TIME  MT Manual Therapy Time Entry: 45  NMR     TE    TA    SCA    ORTH TRAIN    ORTH SUB    LCODE  GRP

## 2024-09-10 ENCOUNTER — APPOINTMENT (OUTPATIENT)
Dept: OCCUPATIONAL THERAPY | Facility: CLINIC | Age: 54
End: 2024-09-10
Payer: COMMERCIAL

## 2024-09-12 ENCOUNTER — TELEPHONE (OUTPATIENT)
Dept: SLEEP MEDICINE | Facility: HOSPITAL | Age: 54
End: 2024-09-12
Payer: COMMERCIAL

## 2024-09-12 ENCOUNTER — APPOINTMENT (OUTPATIENT)
Dept: OCCUPATIONAL THERAPY | Facility: CLINIC | Age: 54
End: 2024-09-12
Payer: COMMERCIAL

## 2024-09-17 ENCOUNTER — APPOINTMENT (OUTPATIENT)
Dept: OCCUPATIONAL THERAPY | Facility: CLINIC | Age: 54
End: 2024-09-17
Payer: COMMERCIAL

## 2024-09-18 ENCOUNTER — APPOINTMENT (OUTPATIENT)
Dept: OCCUPATIONAL THERAPY | Facility: CLINIC | Age: 54
End: 2024-09-18
Payer: COMMERCIAL

## 2024-09-19 ENCOUNTER — APPOINTMENT (OUTPATIENT)
Dept: OCCUPATIONAL THERAPY | Facility: CLINIC | Age: 54
End: 2024-09-19
Payer: COMMERCIAL

## 2024-09-24 ENCOUNTER — APPOINTMENT (OUTPATIENT)
Dept: OCCUPATIONAL THERAPY | Facility: CLINIC | Age: 54
End: 2024-09-24
Payer: COMMERCIAL

## 2024-09-24 ENCOUNTER — APPOINTMENT (OUTPATIENT)
Dept: RADIOLOGY | Facility: CLINIC | Age: 54
End: 2024-09-24
Payer: COMMERCIAL

## 2024-09-24 ENCOUNTER — HOSPITAL ENCOUNTER (OUTPATIENT)
Dept: RADIOLOGY | Facility: CLINIC | Age: 54
Discharge: HOME | End: 2024-09-24
Payer: COMMERCIAL

## 2024-09-24 DIAGNOSIS — Z12.2 ENCOUNTER FOR SCREENING FOR LUNG CANCER: ICD-10-CM

## 2024-09-24 PROCEDURE — 71271 CT THORAX LUNG CANCER SCR C-: CPT | Performed by: RADIOLOGY

## 2024-09-24 PROCEDURE — 71271 CT THORAX LUNG CANCER SCR C-: CPT

## 2024-09-26 ENCOUNTER — APPOINTMENT (OUTPATIENT)
Dept: OCCUPATIONAL THERAPY | Facility: CLINIC | Age: 54
End: 2024-09-26
Payer: COMMERCIAL

## 2024-10-17 DIAGNOSIS — E03.8 OTHER SPECIFIED HYPOTHYROIDISM: ICD-10-CM

## 2024-10-17 RX ORDER — LEVOTHYROXINE SODIUM 125 UG/1
125 TABLET ORAL DAILY
Qty: 90 TABLET | Refills: 1 | Status: SHIPPED | OUTPATIENT
Start: 2024-10-17 | End: 2025-04-15

## 2024-10-21 ENCOUNTER — APPOINTMENT (OUTPATIENT)
Dept: GASTROENTEROLOGY | Facility: EXTERNAL LOCATION | Age: 54
End: 2024-10-21
Payer: COMMERCIAL

## 2024-10-21 DIAGNOSIS — Z12.11 COLON CANCER SCREENING: ICD-10-CM

## 2024-10-21 DIAGNOSIS — Z86.0100 HX OF COLONIC POLYPS: ICD-10-CM

## 2024-10-21 DIAGNOSIS — K22.70 BARRETT'S ESOPHAGUS WITHOUT DYSPLASIA: Primary | ICD-10-CM

## 2024-10-21 DIAGNOSIS — K22.719 BARRETT'S ESOPHAGUS WITH DYSPLASIA: ICD-10-CM

## 2024-10-21 PROCEDURE — 88305 TISSUE EXAM BY PATHOLOGIST: CPT

## 2024-10-21 PROCEDURE — G0105 COLORECTAL SCRN; HI RISK IND: HCPCS | Performed by: INTERNAL MEDICINE

## 2024-10-21 PROCEDURE — 0753T DGTZ GLS MCRSCP SLD LEVEL IV: CPT

## 2024-10-21 PROCEDURE — 43239 EGD BIOPSY SINGLE/MULTIPLE: CPT | Performed by: INTERNAL MEDICINE

## 2024-10-21 PROCEDURE — 3048F LDL-C <100 MG/DL: CPT | Performed by: INTERNAL MEDICINE

## 2024-10-21 PROCEDURE — 3062F POS MACROALBUMINURIA REV: CPT | Performed by: INTERNAL MEDICINE

## 2024-10-21 PROCEDURE — 3044F HG A1C LEVEL LT 7.0%: CPT | Performed by: INTERNAL MEDICINE

## 2024-10-21 PROCEDURE — 88305 TISSUE EXAM BY PATHOLOGIST: CPT | Performed by: PATHOLOGY

## 2024-10-21 NOTE — PROGRESS NOTES
EGD/colonoscopy performed today 10/21/2024 at the [facility] ([facility]).  See procedure report(s) under Media tab.

## 2024-10-22 ENCOUNTER — LAB REQUISITION (OUTPATIENT)
Dept: LAB | Facility: HOSPITAL | Age: 54
End: 2024-10-22
Payer: COMMERCIAL

## 2024-10-28 LAB
LABORATORY COMMENT REPORT: NORMAL
PATH REPORT.FINAL DX SPEC: NORMAL
PATH REPORT.GROSS SPEC: NORMAL
PATH REPORT.RELEVANT HX SPEC: NORMAL
PATH REPORT.TOTAL CANCER: NORMAL

## 2024-11-06 ENCOUNTER — LAB (OUTPATIENT)
Dept: LAB | Facility: LAB | Age: 54
End: 2024-11-06
Payer: COMMERCIAL

## 2024-11-06 DIAGNOSIS — E29.1 HYPOGONADISM IN MALE: ICD-10-CM

## 2024-11-06 DIAGNOSIS — E03.9 HYPOTHYROIDISM, UNSPECIFIED TYPE: ICD-10-CM

## 2024-11-06 LAB
BASOPHILS # BLD AUTO: 0.1 X10*3/UL (ref 0–0.1)
BASOPHILS NFR BLD AUTO: 1.3 %
EOSINOPHIL # BLD AUTO: 0.53 X10*3/UL (ref 0–0.7)
EOSINOPHIL NFR BLD AUTO: 6.9 %
ERYTHROCYTE [DISTWIDTH] IN BLOOD BY AUTOMATED COUNT: 13.1 % (ref 11.5–14.5)
HCT VFR BLD AUTO: 47.8 % (ref 41–52)
HGB BLD-MCNC: 15.5 G/DL (ref 13.5–17.5)
IMM GRANULOCYTES # BLD AUTO: 0.02 X10*3/UL (ref 0–0.7)
IMM GRANULOCYTES NFR BLD AUTO: 0.3 % (ref 0–0.9)
LYMPHOCYTES # BLD AUTO: 2.21 X10*3/UL (ref 1.2–4.8)
LYMPHOCYTES NFR BLD AUTO: 28.7 %
MCH RBC QN AUTO: 31.1 PG (ref 26–34)
MCHC RBC AUTO-ENTMCNC: 32.4 G/DL (ref 32–36)
MCV RBC AUTO: 96 FL (ref 80–100)
MONOCYTES # BLD AUTO: 0.85 X10*3/UL (ref 0.1–1)
MONOCYTES NFR BLD AUTO: 11 %
NEUTROPHILS # BLD AUTO: 4 X10*3/UL (ref 1.2–7.7)
NEUTROPHILS NFR BLD AUTO: 51.8 %
NRBC BLD-RTO: 0 /100 WBCS (ref 0–0)
PLATELET # BLD AUTO: 223 X10*3/UL (ref 150–450)
RBC # BLD AUTO: 4.99 X10*6/UL (ref 4.5–5.9)
T4 FREE SERPL-MCNC: 1.02 NG/DL (ref 0.78–1.48)
TSH SERPL-ACNC: 5.71 MIU/L (ref 0.44–3.98)
WBC # BLD AUTO: 7.7 X10*3/UL (ref 4.4–11.3)

## 2024-11-06 PROCEDURE — 84154 ASSAY OF PSA FREE: CPT

## 2024-11-06 PROCEDURE — 85025 COMPLETE CBC W/AUTO DIFF WBC: CPT

## 2024-11-06 PROCEDURE — 84443 ASSAY THYROID STIM HORMONE: CPT

## 2024-11-06 PROCEDURE — 84153 ASSAY OF PSA TOTAL: CPT

## 2024-11-06 PROCEDURE — 84402 ASSAY OF FREE TESTOSTERONE: CPT

## 2024-11-06 PROCEDURE — 84439 ASSAY OF FREE THYROXINE: CPT

## 2024-11-06 PROCEDURE — 36415 COLL VENOUS BLD VENIPUNCTURE: CPT

## 2024-11-08 LAB
PSA FREE MFR SERPL: 43 %
PSA FREE SERPL-MCNC: 0.3 NG/ML
PSA SERPL IA-MCNC: 0.7 NG/ML (ref 0–4)

## 2024-11-12 LAB
TESTOSTERONE FREE (CHAN): 87.7 PG/ML (ref 35–155)
TESTOSTERONE,TOTAL,LC-MS/MS: 459 NG/DL (ref 250–1100)

## 2024-11-20 ENCOUNTER — DOCUMENTATION (OUTPATIENT)
Dept: PHYSICAL THERAPY | Facility: CLINIC | Age: 54
End: 2024-11-20
Payer: COMMERCIAL

## 2024-11-20 NOTE — PROGRESS NOTES
Physical Therapy    Discharge Summary    Name: Eliseo Patricio  MRN: [id number]  : 1970  Date: 24    Discharge Summary: PT    Discharge Information: Date of discharge 24

## 2024-12-26 ENCOUNTER — LAB (OUTPATIENT)
Dept: LAB | Facility: LAB | Age: 54
End: 2024-12-26
Payer: COMMERCIAL

## 2024-12-26 DIAGNOSIS — E03.9 HYPOTHYROIDISM, UNSPECIFIED TYPE: ICD-10-CM

## 2024-12-26 PROCEDURE — 84439 ASSAY OF FREE THYROXINE: CPT

## 2024-12-26 PROCEDURE — 84443 ASSAY THYROID STIM HORMONE: CPT

## 2024-12-27 LAB
T4 FREE SERPL-MCNC: 1.26 NG/DL (ref 0.78–1.48)
TSH SERPL-ACNC: 5.56 MIU/L (ref 0.44–3.98)

## 2025-01-01 DIAGNOSIS — E03.9 HYPOTHYROIDISM, UNSPECIFIED TYPE: Primary | ICD-10-CM

## 2025-01-01 RX ORDER — LEVOTHYROXINE SODIUM 150 UG/1
150 TABLET ORAL DAILY
Qty: 90 TABLET | Refills: 3 | Status: SHIPPED | OUTPATIENT
Start: 2025-01-01 | End: 2026-01-01

## 2025-01-09 DIAGNOSIS — E03.9 HYPOTHYROIDISM, UNSPECIFIED TYPE: ICD-10-CM

## 2025-01-09 DIAGNOSIS — E29.1 HYPOGONADISM IN MALE: ICD-10-CM

## 2025-01-09 RX ORDER — LEVOTHYROXINE SODIUM 150 UG/1
150 TABLET ORAL DAILY
Qty: 90 TABLET | Refills: 3 | Status: SHIPPED | OUTPATIENT
Start: 2025-01-09 | End: 2026-01-09

## 2025-01-09 RX ORDER — TESTOSTERONE CYPIONATE 200 MG/ML
200 INJECTION, SOLUTION INTRAMUSCULAR
Qty: 5 ML | Refills: 1 | Status: SHIPPED | OUTPATIENT
Start: 2025-01-22 | End: 2025-06-11

## 2025-01-21 ENCOUNTER — HOSPITAL ENCOUNTER (OUTPATIENT)
Dept: RADIOLOGY | Facility: CLINIC | Age: 55
Discharge: HOME | End: 2025-01-21
Payer: COMMERCIAL

## 2025-01-21 DIAGNOSIS — M77.01 MEDIAL EPICONDYLITIS OF RIGHT ELBOW: ICD-10-CM

## 2025-01-21 PROCEDURE — 73221 MRI JOINT UPR EXTREM W/O DYE: CPT | Mod: LT

## 2025-01-21 PROCEDURE — 73221 MRI JOINT UPR EXTREM W/O DYE: CPT | Mod: LEFT SIDE | Performed by: RADIOLOGY

## 2025-01-23 ENCOUNTER — TELEPHONE (OUTPATIENT)
Dept: PHYSICAL THERAPY | Facility: CLINIC | Age: 55
End: 2025-01-23
Payer: COMMERCIAL

## 2025-01-23 NOTE — TELEPHONE ENCOUNTER
Copied from CRM #[id number]. Topic: Information Request - Doctor, Hospital, or Provider  >> Jan 23, 2025  3:06 PM Julissa VIDAL wrote:  Patient requested to have someone from the office to ebrt him back  Information has been provided to Patient.

## 2025-01-29 DIAGNOSIS — S53.442S TEAR OF ULNAR COLLATERAL LIGAMENT OF ELBOW, LEFT, SEQUELA: ICD-10-CM

## 2025-01-29 DIAGNOSIS — M77.02 MEDIAL EPICONDYLITIS OF LEFT ELBOW: Primary | ICD-10-CM

## 2025-02-04 ENCOUNTER — HOSPITAL ENCOUNTER (EMERGENCY)
Facility: HOSPITAL | Age: 55
Discharge: HOME | End: 2025-02-04
Attending: EMERGENCY MEDICINE
Payer: COMMERCIAL

## 2025-02-04 VITALS
SYSTOLIC BLOOD PRESSURE: 125 MMHG | WEIGHT: 220 LBS | BODY MASS INDEX: 28.23 KG/M2 | RESPIRATION RATE: 16 BRPM | TEMPERATURE: 98.6 F | HEART RATE: 62 BPM | DIASTOLIC BLOOD PRESSURE: 64 MMHG | HEIGHT: 74 IN | OXYGEN SATURATION: 100 %

## 2025-02-04 DIAGNOSIS — M70.21 OLECRANON BURSITIS OF RIGHT ELBOW: Primary | ICD-10-CM

## 2025-02-04 LAB
ALBUMIN SERPL BCP-MCNC: 4.4 G/DL (ref 3.4–5)
ALP SERPL-CCNC: 63 U/L (ref 33–120)
ALT SERPL W P-5'-P-CCNC: 28 U/L (ref 10–52)
ANION GAP SERPL CALC-SCNC: 12 MMOL/L (ref 10–20)
AST SERPL W P-5'-P-CCNC: 26 U/L (ref 9–39)
BASOPHILS # BLD AUTO: 0.08 X10*3/UL (ref 0–0.1)
BASOPHILS NFR BLD AUTO: 0.6 %
BILIRUB SERPL-MCNC: 0.6 MG/DL (ref 0–1.2)
BUN SERPL-MCNC: 36 MG/DL (ref 6–23)
CALCIUM SERPL-MCNC: 9.4 MG/DL (ref 8.6–10.3)
CHLORIDE SERPL-SCNC: 102 MMOL/L (ref 98–107)
CO2 SERPL-SCNC: 26 MMOL/L (ref 21–32)
CREAT SERPL-MCNC: 0.96 MG/DL (ref 0.5–1.3)
CRP SERPL-MCNC: 1.58 MG/DL
EGFRCR SERPLBLD CKD-EPI 2021: >90 ML/MIN/1.73M*2
EOSINOPHIL # BLD AUTO: 0.32 X10*3/UL (ref 0–0.7)
EOSINOPHIL NFR BLD AUTO: 2.3 %
ERYTHROCYTE [DISTWIDTH] IN BLOOD BY AUTOMATED COUNT: 13.1 % (ref 11.5–14.5)
ERYTHROCYTE [SEDIMENTATION RATE] IN BLOOD BY WESTERGREN METHOD: 4 MM/H (ref 0–20)
GLUCOSE SERPL-MCNC: 123 MG/DL (ref 74–99)
HCT VFR BLD AUTO: 48.4 % (ref 41–52)
HGB BLD-MCNC: 16.1 G/DL (ref 13.5–17.5)
IMM GRANULOCYTES # BLD AUTO: 0.04 X10*3/UL (ref 0–0.7)
IMM GRANULOCYTES NFR BLD AUTO: 0.3 % (ref 0–0.9)
LYMPHOCYTES # BLD AUTO: 2.35 X10*3/UL (ref 1.2–4.8)
LYMPHOCYTES NFR BLD AUTO: 16.7 %
MCH RBC QN AUTO: 31.1 PG (ref 26–34)
MCHC RBC AUTO-ENTMCNC: 33.3 G/DL (ref 32–36)
MCV RBC AUTO: 93 FL (ref 80–100)
MONOCYTES # BLD AUTO: 1.43 X10*3/UL (ref 0.1–1)
MONOCYTES NFR BLD AUTO: 10.2 %
NEUTROPHILS # BLD AUTO: 9.83 X10*3/UL (ref 1.2–7.7)
NEUTROPHILS NFR BLD AUTO: 69.9 %
NRBC BLD-RTO: 0 /100 WBCS (ref 0–0)
PLATELET # BLD AUTO: 239 X10*3/UL (ref 150–450)
POTASSIUM SERPL-SCNC: 3.9 MMOL/L (ref 3.5–5.3)
PROT SERPL-MCNC: 6.9 G/DL (ref 6.4–8.2)
RBC # BLD AUTO: 5.18 X10*6/UL (ref 4.5–5.9)
SODIUM SERPL-SCNC: 136 MMOL/L (ref 136–145)
WBC # BLD AUTO: 14.1 X10*3/UL (ref 4.4–11.3)

## 2025-02-04 PROCEDURE — 99283 EMERGENCY DEPT VISIT LOW MDM: CPT | Performed by: EMERGENCY MEDICINE

## 2025-02-04 PROCEDURE — 2500000002 HC RX 250 W HCPCS SELF ADMINISTERED DRUGS (ALT 637 FOR MEDICARE OP, ALT 636 FOR OP/ED)

## 2025-02-04 PROCEDURE — 36415 COLL VENOUS BLD VENIPUNCTURE: CPT | Performed by: EMERGENCY MEDICINE

## 2025-02-04 PROCEDURE — 86140 C-REACTIVE PROTEIN: CPT | Performed by: EMERGENCY MEDICINE

## 2025-02-04 PROCEDURE — 80053 COMPREHEN METABOLIC PANEL: CPT | Performed by: EMERGENCY MEDICINE

## 2025-02-04 PROCEDURE — 85025 COMPLETE CBC W/AUTO DIFF WBC: CPT | Performed by: EMERGENCY MEDICINE

## 2025-02-04 PROCEDURE — 85652 RBC SED RATE AUTOMATED: CPT | Performed by: EMERGENCY MEDICINE

## 2025-02-04 PROCEDURE — 99284 EMERGENCY DEPT VISIT MOD MDM: CPT | Performed by: EMERGENCY MEDICINE

## 2025-02-04 PROCEDURE — 2500000001 HC RX 250 WO HCPCS SELF ADMINISTERED DRUGS (ALT 637 FOR MEDICARE OP)

## 2025-02-04 RX ORDER — SULFAMETHOXAZOLE AND TRIMETHOPRIM 800; 160 MG/1; MG/1
1 TABLET ORAL ONCE
Status: COMPLETED | OUTPATIENT
Start: 2025-02-04 | End: 2025-02-04

## 2025-02-04 RX ORDER — ACETAMINOPHEN 325 MG/1
975 TABLET ORAL ONCE
Status: COMPLETED | OUTPATIENT
Start: 2025-02-04 | End: 2025-02-04

## 2025-02-04 RX ORDER — SULFAMETHOXAZOLE AND TRIMETHOPRIM 800; 160 MG/1; MG/1
1 TABLET ORAL 2 TIMES DAILY
Qty: 14 TABLET | Refills: 0 | Status: SHIPPED | OUTPATIENT
Start: 2025-02-04 | End: 2025-02-11

## 2025-02-04 RX ORDER — SULFAMETHOXAZOLE AND TRIMETHOPRIM 800; 160 MG/1; MG/1
1 TABLET ORAL 2 TIMES DAILY
Qty: 14 TABLET | Refills: 0 | Status: SHIPPED | OUTPATIENT
Start: 2025-02-04 | End: 2025-02-04

## 2025-02-04 RX ADMIN — ACETAMINOPHEN 975 MG: 325 TABLET ORAL at 21:53

## 2025-02-04 RX ADMIN — SULFAMETHOXAZOLE AND TRIMETHOPRIM 1 TABLET: 800; 160 TABLET ORAL at 21:54

## 2025-02-04 ASSESSMENT — LIFESTYLE VARIABLES
EVER HAD A DRINK FIRST THING IN THE MORNING TO STEADY YOUR NERVES TO GET RID OF A HANGOVER: NO
TOTAL SCORE: 0
EVER FELT BAD OR GUILTY ABOUT YOUR DRINKING: NO
HAVE PEOPLE ANNOYED YOU BY CRITICIZING YOUR DRINKING: NO
HAVE YOU EVER FELT YOU SHOULD CUT DOWN ON YOUR DRINKING: NO

## 2025-02-04 ASSESSMENT — PAIN DESCRIPTION - ORIENTATION
ORIENTATION: RIGHT
ORIENTATION: RIGHT

## 2025-02-04 ASSESSMENT — COLUMBIA-SUICIDE SEVERITY RATING SCALE - C-SSRS
6. HAVE YOU EVER DONE ANYTHING, STARTED TO DO ANYTHING, OR PREPARED TO DO ANYTHING TO END YOUR LIFE?: NO
1. IN THE PAST MONTH, HAVE YOU WISHED YOU WERE DEAD OR WISHED YOU COULD GO TO SLEEP AND NOT WAKE UP?: NO
2. HAVE YOU ACTUALLY HAD ANY THOUGHTS OF KILLING YOURSELF?: NO

## 2025-02-04 ASSESSMENT — PAIN SCALES - PAIN ASSESSMENT IN ADVANCED DEMENTIA (PAINAD): TOTALSCORE: MEDICATION (SEE MAR)

## 2025-02-04 ASSESSMENT — PAIN SCALES - GENERAL
PAINLEVEL_OUTOF10: 7
PAINLEVEL_OUTOF10: 8
PAINLEVEL_OUTOF10: 7

## 2025-02-04 ASSESSMENT — PAIN - FUNCTIONAL ASSESSMENT: PAIN_FUNCTIONAL_ASSESSMENT: 0-10

## 2025-02-04 ASSESSMENT — PAIN DESCRIPTION - LOCATION
LOCATION: ELBOW
LOCATION: ELBOW

## 2025-02-04 NOTE — Clinical Note
Eliseo Patricio was seen and treated in our emergency department on 2/4/2025.  He may return to work on 02/06/2025.       If you have any questions or concerns, please don't hesitate to call.      Lois Bardales RN

## 2025-02-05 NOTE — DISCHARGE INSTRUCTIONS
You are seen emergency department today for evaluation of elbow pain.  We have given you antibiotics for possible infectious bursitis.  We have also provided you with orthopedic referral follow-up.  Please reach out to orthopedic as a earliest convenience.  You develop any worsening pain fever nausea vomiting please come back to emergency department soon as possible.  Please follow-up your primary care provider within a week.

## 2025-02-05 NOTE — ED PROVIDER NOTES
EMERGENCY DEPARTMENT ENCOUNTER      Pt Name: Eliseo Patricio  MRN: [id number]  Birthdate 1970  Date of evaluation: 2/4/2025  Provider: Shukri Aguilar MD    CHIEF COMPLAINT       Chief Complaint   Patient presents with    Elbow Pain     Red/warm/swollen, woke up today with Sx     HISTORY OF PRESENT ILLNESS    HPI  54-year-old male to the emergency department chief complaint elevated.  Patient claims that yesterday he was exercising and he was fine with no pain he woke up in the night with severe pain and swelling of his right elbow.  Patient denies any fever nausea or vomiting.  He denies any trauma to that area however he indicates that he was doing planks which may have contributed.  Nursing Notes were reviewed.    PAST MEDICAL HISTORY   History reviewed. No pertinent past medical history.      SURGICAL HISTORY       Past Surgical History:   Procedure Laterality Date    OTHER SURGICAL HISTORY  12/12/2019    [place] tooth extraction    OTHER SURGICAL HISTORY  12/12/2019    Tonsillectomy         CURRENT MEDICATIONS       Previous Medications    ASCORBIC ACID (VITAMIN C) 1,000 MG TABLET    Take 1 tablet (1,000 mg) by mouth once daily.    FLUTICASONE (FLONASE) 50 MCG/ACTUATION NASAL SPRAY    Administer 2 sprays into each nostril once daily.    LEVOTHYROXINE (SYNTHROID, LEVOXYL) 150 MCG TABLET    Take 1 tablet (150 mcg) by mouth early in the morning.. Take on an empty stomach at the same time each day, either 30 to 60 minutes prior to breakfast    TESTOSTERONE CYPIONATE (DEPO-TESTOSTERONE) 200 MG/ML INJECTION    Inject 1 mL (200 mg) into the muscle every 14 (fourteen) days. Do not fill before January 22, 2025.       ALLERGIES     Penicillins    FAMILY HISTORY     No family history on file.       SOCIAL HISTORY       Social History     Socioeconomic History    Marital status:    Tobacco Use    Smoking status: Former     Types: Cigarettes     Passive exposure: Never    Smokeless tobacco: Never   Vaping Use     Vaping status: Never Used   Substance and Sexual Activity    Alcohol use: Not Currently    Drug use: Not Currently    Sexual activity: Defer       SCREENINGS                        PHYSICAL EXAM    (up to 7 for level 4, 8 or more for level 5)     ED Triage Vitals [02/04/25 1759]   Temperature Heart Rate Respirations BP   37 °C (98.6 °F) 76 17 138/64      Pulse Ox Temp Source Heart Rate Source Patient Position   98 % Temporal Monitor Sitting      BP Location FiO2 (%)     Left arm --       Physical Exam  Constitutional:       Appearance: Normal appearance.   HENT:      Head: Normocephalic and atraumatic.      Nose: Nose normal.      Mouth/Throat:      Mouth: Mucous membranes are moist.   Eyes:      Extraocular Movements: Extraocular movements intact.      Pupils: Pupils are equal, round, and reactive to light.   Cardiovascular:      Rate and Rhythm: Normal rate and regular rhythm.      Pulses: Normal pulses.      Heart sounds: Normal heart sounds.   Pulmonary:      Effort: Pulmonary effort is normal.      Breath sounds: Normal breath sounds.   Abdominal:      General: Abdomen is flat.   Musculoskeletal:      Right elbow: Swelling present. Decreased range of motion. Tenderness present in olecranon process.      Left elbow: Normal.      Cervical back: Normal range of motion and neck supple.      Comments: Patient has swelling of the elbow, bursa does appear erythematous.  Sensation and strength are intact patient does have pain with movement however.   Neurological:      Mental Status: He is alert.          DIAGNOSTIC RESULTS     LABS:  Labs Reviewed   CBC WITH AUTO DIFFERENTIAL - Abnormal       Result Value    WBC 14.1 (*)     nRBC 0.0      RBC 5.18      Hemoglobin 16.1      Hematocrit 48.4      MCV 93      MCH 31.1      MCHC 33.3      RDW 13.1      Platelets 239      Neutrophils % 69.9      Immature Granulocytes %, Automated 0.3      Lymphocytes % 16.7      Monocytes % 10.2      Eosinophils % 2.3      Basophils % 0.6       Neutrophils Absolute 9.83 (*)     Immature Granulocytes Absolute, Automated 0.04      Lymphocytes Absolute 2.35      Monocytes Absolute 1.43 (*)     Eosinophils Absolute 0.32      Basophils Absolute 0.08     COMPREHENSIVE METABOLIC PANEL - Abnormal    Glucose 123 (*)     Sodium 136      Potassium 3.9      Chloride 102      Bicarbonate 26      Anion Gap 12      Urea Nitrogen 36 (*)     Creatinine 0.96      eGFR >90      Calcium 9.4      Albumin 4.4      Alkaline Phosphatase 63      Total Protein 6.9      AST 26      Bilirubin, Total 0.6      ALT 28     C-REACTIVE PROTEIN - Abnormal    C-Reactive Protein 1.58 (*)    SEDIMENTATION RATE, AUTOMATED - Normal    Sedimentation Rate 4         All other labs were within normal range or not returned as of this dictation.    Imaging  No orders to display        Procedures  Procedures     EMERGENCY DEPARTMENT COURSE/MDM:   Medical Decision Making  54-year-old male presents emergency department chief complaint of elbow pain.  Medical management treatment emergency department will consist of basic laboratories which show an elevated white blood cell count.  This is most likely due to the patient's cellulitis over his bursitis.  We will  Bactrim here in the emergency department and sent him with a 7-day course.  We also provided the patient with orthopedic follow-up.  He indicates he feels well and would like to go home.  We have given him strict return precautions.  No acute concern for any septic joint as the pain is limited and focalized to the bursa.    Diagnoses as of 02/04/25 2152   Olecranon bursitis of right elbow        Patient and or family in agreement and understanding of treatment plan.  All questions answered.      I reviewed the case with the attending ED physician. The attending ED physician agrees with the plan. Patient and/or patient´s representative was counseled regarding labs, imaging, likely diagnosis, and plan. All questions were answered.    ED  Medications administered this visit:    Medications   sulfamethoxazole-trimethoprim (Bactrim DS) 800-160 mg per tablet 1 tablet (has no administration in time range)   acetaminophen (Tylenol) tablet 975 mg (has no administration in time range)       New Prescriptions from this visit:    New Prescriptions    SULFAMETHOXAZOLE-TRIMETHOPRIM (BACTRIM DS) 800-160 MG TABLET    Take 1 tablet by mouth 2 times a day for 7 days.       Follow-up:  Ana Luisa Rg MD  [address]   [facility], [address] [facility] [address]  [phone]    Call       Faizan Purvis MD  [address]  [facility], [address] [address]  [phone]    Schedule an appointment as soon as possible for a visit       [facility]  [address] [facility] [address] [phone]    As needed        Final Impression:   1. Olecranon bursitis of right elbow          (Please note that portions of this note were completed with a voice recognition program.  Efforts were made to edit the dictations but occasionally words are mis-transcribed.)     Shukri Aguilar MD  Resident  02/04/25 4401

## 2025-02-07 ENCOUNTER — OFFICE VISIT (OUTPATIENT)
Dept: ORTHOPEDIC SURGERY | Facility: HOSPITAL | Age: 55
End: 2025-02-07
Payer: COMMERCIAL

## 2025-02-07 ENCOUNTER — EVALUATION (OUTPATIENT)
Dept: PHYSICAL THERAPY | Facility: CLINIC | Age: 55
End: 2025-02-07
Payer: COMMERCIAL

## 2025-02-07 DIAGNOSIS — M70.21 OLECRANON BURSITIS OF RIGHT ELBOW: ICD-10-CM

## 2025-02-07 DIAGNOSIS — M25.521 RIGHT ELBOW PAIN: ICD-10-CM

## 2025-02-07 DIAGNOSIS — M77.02 MEDIAL EPICONDYLITIS OF LEFT ELBOW: Primary | ICD-10-CM

## 2025-02-07 PROCEDURE — 1036F TOBACCO NON-USER: CPT | Performed by: ORTHOPAEDIC SURGERY

## 2025-02-07 PROCEDURE — 97161 PT EVAL LOW COMPLEX 20 MIN: CPT | Mod: GP

## 2025-02-07 PROCEDURE — 99203 OFFICE O/P NEW LOW 30 MIN: CPT | Performed by: ORTHOPAEDIC SURGERY

## 2025-02-07 PROCEDURE — 97110 THERAPEUTIC EXERCISES: CPT | Mod: GP

## 2025-02-07 PROCEDURE — 99213 OFFICE O/P EST LOW 20 MIN: CPT | Performed by: ORTHOPAEDIC SURGERY

## 2025-02-07 NOTE — PROGRESS NOTES
CHIEF COMPLAINT         Right elbow pain    ASSESSMENT + PLAN     ***        HISTORY OF PRESENT ILLNESS       Patient is a 54 y.o. ***-hand dominant male ***, who presents today for evaluation of ***      REVIEW OF SYSTEMS       A 30-item multi-system Review Of Systems was obtained on today's intake form.  This was reviewed with the patient and is correct.  The pertinent positives and negatives are listed above.  The form has been scanned separately into the medical record.      PHYSICAL EXAM    Constitutional:    Appears stated age. Well-developed and well-nourished ***.  Psychiatric:         Pleasant normal mood and affect. Behavior is appropriate for the situation.   Head:                   Normocephalic and atraumatic.  Eyes:                    Pupils are equal and round.  Cardiovascular:  2+ radial and ulnar pulses. Fingers well-perfused.  Respiratory:        Effort normal. No respiratory distress. Speaking in complete sentences.  Neurologic:       Alert and oriented to person, place, and time.  Skin:                Skin is intact, warm and dry.  Hematologic / Lymphatic:    No lymphedema or lymphangitis.    Extremities / Musculoskeletal:                      ***      IMAGING / LABS / EMGs           ***      No past medical history on file.    Medication Documentation Review Audit       Reviewed by Anita Yo RN (Registered Nurse) on 25 at 1802      Medication Order Taking? Sig Documenting Provider Last Dose Status   ascorbic acid (Vitamin C) 1,000 mg tablet [id number]  Take 1 tablet (1,000 mg) by mouth once daily. Historical Provider, MD  Active   fluticasone (Flonase) 50 mcg/actuation nasal spray [id number]  Administer 2 sprays into each nostril once daily. Kd Singleton, DO   24 [id number]   levothyroxine (Synthroid, Levoxyl) 150 mcg tablet [id number]  Take 1 tablet (150 mcg) by mouth early in the morning.. Take on an empty stomach at the same time each day, either 30 to 60 minutes prior to  breakfast Faizan Purvis MD  Active   testosterone cypionate (Depo-Testosterone) 200 mg/mL injection [id number]  Inject 1 mL (200 mg) into the muscle every 14 (fourteen) days. Do not fill before January 22, 2025. Faizan Purvis MD  Active                    Allergies   Allergen Reactions    Penicillins Hives       Social History     Socioeconomic History    Marital status:      Spouse name: Not on file    Number of children: Not on file    Years of education: Not on file    Highest education level: Not on file   Occupational History    Not on file   Tobacco Use    Smoking status: Former     Types: Cigarettes     Passive exposure: Never    Smokeless tobacco: Never   Vaping Use    Vaping status: Never Used   Substance and Sexual Activity    Alcohol use: Not Currently    Drug use: Not Currently    Sexual activity: Defer   Other Topics Concern    Not on file   Social History Narrative    Not on file     Social Drivers of Health     Financial Resource Strain: Not on file   Food Insecurity: Not on file   Transportation Needs: Not on file   Physical Activity: Not on file   Stress: Not on file   Social Connections: Not on file   Intimate Partner Violence: Not on file   Housing Stability: Not on file       Past Surgical History:   Procedure Laterality Date    OTHER SURGICAL HISTORY  12/12/2019    [place] tooth extraction    OTHER SURGICAL HISTORY  12/12/2019    Tonsillectomy         Electronically Signed      KB Blanchard MD      Orthopaedic Hand Surgery      [phone]   (Depo-Testosterone) 200 mg/mL injection [id number]  Inject 1 mL (200 mg) into the muscle every 14 (fourteen) days. Do not fill before January 22, 2025. Faizan Purvis MD  Active                    Allergies   Allergen Reactions    Penicillins Hives       Social History     Socioeconomic History    Marital status:      Spouse name: Not on file    Number of children: Not on file    Years of education: Not on file    Highest education level: Not on file   Occupational History    Not on file   Tobacco Use    Smoking status: Former     Types: Cigarettes     Passive exposure: Never    Smokeless tobacco: Never   Vaping Use    Vaping status: Never Used   Substance and Sexual Activity    Alcohol use: Not Currently    Drug use: Not Currently    Sexual activity: Defer   Other Topics Concern    Not on file   Social History Narrative    Not on file     Social Drivers of Health     Financial Resource Strain: Not on file   Food Insecurity: Not on file   Transportation Needs: Not on file   Physical Activity: Not on file   Stress: Not on file   Social Connections: Not on file   Intimate Partner Violence: Not on file   Housing Stability: Not on file       Past Surgical History:   Procedure Laterality Date    OTHER SURGICAL HISTORY  12/12/2019    [place] tooth extraction    OTHER SURGICAL HISTORY  12/12/2019    Tonsillectomy         Electronically Signed      KB Blanchard MD      Orthopaedic Hand Surgery      [phone]

## 2025-02-07 NOTE — LETTER
improving as he presents today for first orthopedic evaluation.    He is not diabetic.  He is hypothyroid on levothyroxine.  He does not smoke.      REVIEW OF SYSTEMS       A 30-item multi-system Review Of Systems was obtained on today's intake form.  This was reviewed with the patient and is correct.  The pertinent positives and negatives are listed above.  The form has been scanned separately into the medical record.      PHYSICAL EXAM    Constitutional:    Appears stated age. Well-developed and well-nourished male in no acute distress .  Psychiatric:         Pleasant normal mood and affect. Behavior is appropriate for the situation.   Head:                   Normocephalic and atraumatic.  Eyes:                    Pupils are equal and round.  Cardiovascular:  2+ radial and ulnar pulses. Fingers well-perfused.  Respiratory:        Effort normal. No respiratory distress. Speaking in complete sentences.  Neurologic:       Alert and oriented to person, place, and time.  Skin:                Skin is intact, warm and dry.  Hematologic / Lymphatic:    No lymphedema or lymphangitis.    Extremities / Musculoskeletal:                      Skin of the right forearm and elbow is intact with no erythema, ecchymosis, or diffuse swelling.  Normal skin drag and coloration.  Full composite finger flexion extension with good symmetric wrist and forearm motion.  Mild swelling within the olecranon bursa.  No increased skin temperature.  Mild erythema.  Stable collateral exam.  No provocative epicondylitis signs.  Biceps and triceps tendon grossly intact.  No elbow joint effusion in the lateral soft spot.  No mid arc pain or crepitus.  Sensation intact to light touch in all distributions.  Capillary refill less than 2 seconds.      IMAGING / LABS / EMGs           None pertinent      No past medical history on file.    Medication Documentation Review Audit       Reviewed by Anita Yo RN (Registered Nurse) on 02/04/25 at 1802       Medication Order Taking? Sig Documenting Provider Last Dose Status   ascorbic acid (Vitamin C) 1,000 mg tablet [id number]  Take 1 tablet (1,000 mg) by mouth once daily. Historical Provider, MD  Active   fluticasone (Flonase) 50 mcg/actuation nasal spray [id number]  Administer 2 sprays into each nostril once daily. Kd Singleton, DO   24 [id number]   levothyroxine (Synthroid, Levoxyl) 150 mcg tablet [id number]  Take 1 tablet (150 mcg) by mouth early in the morning.. Take on an empty stomach at the same time each day, either 30 to 60 minutes prior to breakfast Faizan Purvis MD  Active   testosterone cypionate (Depo-Testosterone) 200 mg/mL injection [id number]  Inject 1 mL (200 mg) into the muscle every 14 (fourteen) days. Do not fill before 2025. Faizan Purvis MD  Active                    Allergies   Allergen Reactions   • Penicillins Hives       Social History     Socioeconomic History   • Marital status:      Spouse name: Not on file   • Number of children: Not on file   • Years of education: Not on file   • Highest education level: Not on file   Occupational History   • Not on file   Tobacco Use   • Smoking status: Former     Types: Cigarettes     Passive exposure: Never   • Smokeless tobacco: Never   Vaping Use   • Vaping status: Never Used   Substance and Sexual Activity   • Alcohol use: Not Currently   • Drug use: Not Currently   • Sexual activity: Defer   Other Topics Concern   • Not on file   Social History Narrative   • Not on file     Social Drivers of Health     Financial Resource Strain: Not on file   Food Insecurity: Not on file   Transportation Needs: Not on file   Physical Activity: Not on file   Stress: Not on file   Social Connections: Not on file   Intimate Partner Violence: Not on file   Housing Stability: Not on file       Past Surgical History:   Procedure Laterality Date   • OTHER SURGICAL HISTORY  2019    [place] tooth extraction   • OTHER SURGICAL  HISTORY  12/12/2019    Tonsillectomy         Electronically Signed      KB Blanchard MD      Orthopaedic Hand Surgery      [phone]

## 2025-02-07 NOTE — Clinical Note
February 10, 2025     Faizan Purvis MD  [address]  [facility], [address] [address]    Patient: Eliseo Patricio   YOB: 1970   Date of Visit: 2/7/2025       Dear Dr. Faizan Purvis MD:    Thank you for referring Eliseo Patricio to me for evaluation. Below are my notes for this consultation.  If you have questions, please do not hesitate to call me. I look forward to following your patient along with you.       Sincerely,     Alban Blanchard MD      CC: Talon Asher, DO  ______________________________________________________________________________________

## 2025-02-07 NOTE — PROGRESS NOTES
Physical Therapy Evaluation and Treatment      Patient Name: Eliseo Patricio  MRN: [id number]  Today's Date: 2/7/2025  Visit #1  Time Calculation  Start Time: 0805  Stop Time: 0856  Time Calculation (min): 51 min    Insurance:  Info: 0 COPAY, 10% COINS, 200 DED (MET), 1200 OOP MAX, 40 VS, NO AUTH REQ       Assessment:  Patient is a 54 year old M presenting to OP PT for bilateral elbow pain. Examination findings are consistent with LUE medial and lateral epicondylalgia and questionable UCL sprain based on subjective report and MRI findings.  However, elbow ligamentous stress testing is insignificant this date. Pt additionally presenting w/medical dx of olecranon bursitis of RUE that has improved but cont to be mod painful w/ADLs. Patient will benefit from physical therapy services to improve listed impairments. Initiated treatment today to address these impairments, pt receptive and in agreement to all discussed.     Patient with the following impairments: decreased muscle performance, decreased activity tolerance, pain, participation restrictions, and difficulty with ADL completion    Patient's response to session: No change in pain, Increase motor control, and Increased knowledge and understanding    Plan:     1 times every week for a total of 10 visits.  Re-assessment after that time.    Current Problem:   1. Medial epicondylitis of left elbow  Referral to Occupational Therapy      2. Right elbow pain  Referral to Occupational Therapy          Subjective   Patient is a 54 year old M presenting to OP PT for L elbow pain. Pt reports he's been having elbow pain for about a year and a half. It comes and goes, the last episode was probably October when it flared up doing pullups. He also just re-injured his R elbow with some bursitis this week. The left was improving just doing wrist curls, stretches at home but has stopped working again. He saw Dr Griffin who gave him an MRI and he meets with him next Friday to review  "those results. He reports he has always had some paresthesia in the arm d/t neck issues but that never flares up.   Onset Date: Year and a half ago  JACOB: Insidious onset, pullups were the last thing that really bothered it.     Pt describes pain as Aches, more intense when he pulls on it or twists the wrist too much.    Pain is worse with Pulling, hanging on it, rotating the arm into supination, working out.   Pain is better with Voltaren, rest, deep tissue massages, US, dry needling.     Pain Rating: (Numeric Pain Scale: #/10)  Current: 3  Best: 0  Worst: 6    Patient denies changes in bowel/bladder, saddle paresthesias, and falls.       Hx of:  - CVA n  - Heart conditions (afib, HTN, OH) n  - DM n  - Major Surgeries: n  - Unexplained Weight Loss/Gain: n  - Other Significant PMH of Note: none reported       Prior Level of Function (PLOF)  Exercise/Physical Activity: Weight lifting, pullups.   Work/School: , commercial heating and cooling.        Patient Goal: \"Get it to as close to 100% as possible where he can workout, do a pullup and not have it flare up.\"                   Objective   VBI Screen:  Patient denies diplopia, dizziness, drop attacks, dysarthria, dysphagia, nystagmus, nausea and numbness at this time.  Supine rotation to end range left and right with 10 sec hold negative    Cervical AROM  Flexion: No loss  Extension: No loss  Rotation L/R: No loss/No loss    Cervical Myotomes (L,R)  C4 Scapular elevation: 5/5, 5/5  C5 Shoulder abduction: 5/5, 5/5  C6 Elbow flexion: 5/5, 5/5  C6 Wrist extension: 5/5, 5/5  C7 Elbow extension: 5/5, 5/5  C7 Wrist flexion: 4/5, 4/5    Shoulder AROM (L/R)  Flexion: 170°/170°  Abduction: 170°/170°  Extension: 60°/60°  External Rotation: 90°/90° - sx provoking.  Internal rotation: 70°/70°    Elbow AROM (L/R)  Flexion: 140°/140°  Extension: 0°/0°  Supination: 90°/90°    -Supination w/shoulder elevation painful.   Pronation: 70°/70°    Observation:  R Elbow has min " redness and olecranon swelling. Down compared to pt's subjective report.     Wrist AROM (L/R)  Flexion: 70°/70°  Extension: 90°/90°  Radial Deviation: 20°/20°  Ulnar Deviation: 40°/40°     Strength w/HHD L: 90#        R: 100lbs - min pain provocation.     MMT (L,R):  Wrist Pronation: 4/5 pain provoking, L side only.   Wrist Supination: 4/5 no pain L side only  Wrist Ext: 4/5, 4/5 min sx provoking  Wrist Flex: 4/5, 4/5 min sx provoking    Elbow Special Tests (L/R)  Varus: Negative/Negative   Valgus: Negative/Negative  Dynamic Valgus Stress: Questionable/Negative  Cozen's: Positive/Negative  Nita's: Positive/Negative      Outcome Measures:    QuickDASH = 25% disability      Treatments:    Therapeutic Exercise (76713):   minutes  X1 set of ea HEP activity  -Patient was educated on PT diagnosis, plan of care, role of therapy, activity modification, symptom monitoring, and home exercise program.   Pt educated on symptoms, anatomy, and benefits of exercises provided in HEP. Pt provided written handout and verbal education on exercises performed below. Pt instructed to stop exercises if pain occurs during performance at home.        HEP:  Access Code: [email or web address]  URL: [email or web address]  Date: 02/07/2025  Prepared by: Tariq Josue    Exercises  - Seated Eccentric Wrist Flexion with Dumbbell  - 1 x daily - 4-5 x weekly - 3 sets - 10 reps  - Seated Eccentric Wrist Extension  - 1 x daily - 4-5 x weekly - 3 sets - 10 reps  - Forearm Supination with Resistance  - 1 x daily - 4-5 x weekly - 3 sets - 10 reps  - Forearm Pronation with Resistance  - 1 x daily - 4-5 x weekly - 3 sets - 10 reps  - Standing Shoulder Row with Anchored Resistance  - 1 x daily - 4-5 x weekly - 3 sets - 10 reps  ^  Education and discussion on HEP and treatment regarding the benefits related to current condition, POC, pathophysiology, and precautions        Goals:  Patient will improve quickDASH score to meet minimal  detectable change of improvement to improve performance of ADLs. (9pts)    Patient will be independent with home exercise program for proper self-management of condition.    Patient will improve pain free active range of motion in deficit areas for ADL completion.    Patient will improve strength in deficit areas so patient can perform ADLs with less pain.    Patient will subjectively report the ability to get through an entire work shift  w/familiar sx </= 1/10 while performing and no significant flare up 24hrs following activity.     Pt will subjectively report the ability to perform gym activities jacqui related to pulling w/familiar sx </= 1/10 while performing and no significant flare up 24hrs following activity.

## 2025-02-12 ENCOUNTER — APPOINTMENT (OUTPATIENT)
Dept: PRIMARY CARE | Facility: CLINIC | Age: 55
End: 2025-02-12
Payer: COMMERCIAL

## 2025-02-13 ENCOUNTER — PROCEDURE VISIT (OUTPATIENT)
Dept: SLEEP MEDICINE | Facility: HOSPITAL | Age: 55
End: 2025-02-13
Payer: COMMERCIAL

## 2025-02-13 ENCOUNTER — OFFICE VISIT (OUTPATIENT)
Dept: ORTHOPEDIC SURGERY | Facility: HOSPITAL | Age: 55
End: 2025-02-13
Payer: COMMERCIAL

## 2025-02-13 VITALS — HEIGHT: 74 IN | BODY MASS INDEX: 28.23 KG/M2 | WEIGHT: 220 LBS

## 2025-02-13 DIAGNOSIS — M19.022 ARTHRITIS OF ELBOW, LEFT: ICD-10-CM

## 2025-02-13 DIAGNOSIS — R40.0 DAYTIME SOMNOLENCE: ICD-10-CM

## 2025-02-13 DIAGNOSIS — S53.442S: Primary | ICD-10-CM

## 2025-02-13 PROCEDURE — 99213 OFFICE O/P EST LOW 20 MIN: CPT | Performed by: EMERGENCY MEDICINE

## 2025-02-13 PROCEDURE — 3008F BODY MASS INDEX DOCD: CPT | Performed by: EMERGENCY MEDICINE

## 2025-02-13 PROCEDURE — 95806 SLEEP STUDY UNATT&RESP EFFT: CPT | Performed by: GENERAL PRACTICE

## 2025-02-13 ASSESSMENT — PAIN SCALES - GENERAL: PAINLEVEL_OUTOF10: 3

## 2025-02-13 ASSESSMENT — PAIN - FUNCTIONAL ASSESSMENT: PAIN_FUNCTIONAL_ASSESSMENT: 0-10

## 2025-02-13 ASSESSMENT — PAIN DESCRIPTION - DESCRIPTORS: DESCRIPTORS: ACHING

## 2025-02-13 NOTE — PROGRESS NOTES
"Subjective   Eliseo Patricio is a 54 y.o. male who presents for Pain of the Left Elbow    HPI    2/13/25: Patient returns today for reevaluation for left elbow pain and MRI results.  He does feel that his pain has slightly improved since his last visit.  However, he does continue to have relief from the hand therapy.  He is taking OTC anti-inflammatories as needed for pain.  No additional injuries.    7/5/24: 54-year-old right-hand-dominant male previously known to me is presenting with a new complaint today.  He presents with complaint of right medial elbow pain that has had for the past 3 months.  He noticed this pain with increased activities and lifting.  He does feel that this pain is somewhat similar to the left lateral elbow pain that he previously had that has improved with hand therapy.  He has tried stretching exercises, Voltaren gel, and a compression sleeve with limited relief.  He has noticed worsening weakness likely secondary to pain.  He denies acute trauma, deformity, ecchymosis, rash, erythema, or fevers.    ROS: All pertinent positive symptoms are included in the history of present illness.    All other systems have been reviewed and are negative and noncontributory to this patient's current ailments.    Objective     Vitals:    02/13/25 1124   Weight: 99.8 kg (220 lb)   Height: 1.88 m (6' 2\")       Physical Exam  General/Constitutional: No apparent distress. Well-nourished and well developed.  Head: Normocephalic, Atraumatic.   Eyes: EOMI.  Vascular: No edema, swelling or tenderness, except as noted in detailed exam.  Respiratory: Non-labored breathing.  Integumentary: No impressive skin lesions present, except as noted in detailed exam.  Neurological: Alert and oriented.  Psychological:  Normal mood and affect.  Musculoskeletal: Normal, except as noted in detailed exam.  Right elbow: No rash.  No deformity.  No erythema.  Tenderness over the medial epicondyle.  Tenderness over the common flexor " tendon and pronator teres.  Pain with resisted wrist flexion and pronation.  Full elbow active range of motion total planes.  Negative varus stress.  Negative valgus stress.  Gross sensation intact throughout.    Imaging:   I have personally reviewed and agree with Radiologist interpretation of previous imaging studies performed prior to this visit.  STUDY:  MRI of the  left elbow with out IV contrast;  1/21/2025 4:09 pm      INDICATION:  Signs/Symptoms:medial elbow pain      COMPARISON:  Left lower radiograph 07/05/2024      ACCESSION NUMBER(S):  [id number]      ORDERING CLINICIAN:  ANISH HADLEY      TECHNIQUE:  Multiplanar multisequence MRI of the  left elbow was performed  without intravenous contrast.      FINDINGS:  Tendons:  The common extensor tendon origin is intact. The common  flexor tendon origin is intact. The triceps tendon insertion is  mildly thickened with intermediate intrasubstance T2 signal with  overlying subcutaneous edema, as seen on axial image 17. There is  mild to moderate tendinosis of the distal biceps near radial  tuberosity insertion with thickening. No biceps tendon tear. The  brachialis insertion is intact.      Ligaments:  The radial collateral ligament is intact. The lateral  ulnar collateral ligament is intact. The ulnar collateral ligament  are irregular with indistinctness of the fibers as well as  intermediate intrasubstance and periligamentous T2 signal, seen on  coronal image 13.      Joints:  There is no dislocation. Mild-to-moderate radiocapitellar  and mild ulnotrochlear articular cartilage thinning is noted. There  is no osteochondral defect. There is mild ulnotrochlear  osteoarthrosis with small osteophytes and subchondral edema, and mild  to moderate radiocapitellar osteoarthrosis with subchondral cystic  change and subchondral edema.      Joint effusion/Bursa:  Small joint effusion is noted.. There is no  olecranon bursitis.      Muscles:  Musculature is normal  without tear or atrophy.      Nerves:  The ulnar nerve is intact.      Bone Marrow:  Nonspecific small amount of marrow edema in the  coronoid process of the proximal ulna which may represent contusion  in appropriate clinical setting versus degenerative in etiology.  Degenerative subchondral cysts of the radiocapitellar articulation  with mild-to-moderate chondral thinning.      IMPRESSION:  1. Mild-to-moderate distal insertional biceps tendinosis.  2. High-grade tear ulnar collateral ligament complex.  3. Mild triceps insertional tendinosis.  4. 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  5. Small elbow joint effusion.          I personally reviewed the images/study and I agree with the findings  as stated by radiology resident. This study was interpreted at  [facility], [place], [place].      MACRO:  None.      Signed by: Devi Machado 1/22/2025 11:10 AM  Dictation workstation:   [id number]      Assessment/Plan   Problem List Items Addressed This Visit    None  Visit Diagnoses       Tear of ulnar collateral ligament of left elbow, sequela    -  Primary    Arthritis of elbow, left                I discussed imaging findings with patient.  Findings are consistent with a UCL tear and elbow DJD.   I do feel this is consistent with his pain. I do not feel that there is a surgical indication for UCL repair for him. At this point, I do feel that continued hand therapy will be beneficial for him additionally, we did discuss the option of a therapeutic corticosteroid injection.  However, he prefers to hold off at this time.  Therefore, he will continue hand therapy and he can resume all activities as tolerated.  I would like to see him back as needed if pain persist or worsens.  If he is not having improvement at that time, we will likely discuss the option of an intra-articular elbow injection.    Osmar Griffin,  DO  [facility], [facility] [place]    ** Please excuse any errors in grammar or translation related to this dictation. Voice recognition software was utilized to prepare this document. **

## 2025-02-14 ENCOUNTER — TREATMENT (OUTPATIENT)
Dept: PHYSICAL THERAPY | Facility: CLINIC | Age: 55
End: 2025-02-14
Payer: COMMERCIAL

## 2025-02-14 DIAGNOSIS — M25.521 RIGHT ELBOW PAIN: ICD-10-CM

## 2025-02-14 DIAGNOSIS — G56.01 CARPAL TUNNEL SYNDROME OF RIGHT WRIST: Primary | ICD-10-CM

## 2025-02-14 DIAGNOSIS — M77.02 MEDIAL EPICONDYLITIS OF LEFT ELBOW: Primary | ICD-10-CM

## 2025-02-14 PROCEDURE — 97110 THERAPEUTIC EXERCISES: CPT | Mod: GP

## 2025-02-14 RX ORDER — SULFAMETHOXAZOLE AND TRIMETHOPRIM 800; 160 MG/1; MG/1
1 TABLET ORAL 2 TIMES DAILY
Qty: 14 TABLET | Refills: 0 | Status: SHIPPED | OUTPATIENT
Start: 2025-02-14 | End: 2025-02-21

## 2025-02-14 NOTE — PROGRESS NOTES
Physical Therapy Treatment      Patient Name: Eliseo Patricio  MRN: [id number]  Today's Date: 2/14/2025  Visit #: 2  Insurance:  0 COPAY, 10% COINS, 200 DED (MET), 1200 OOP MAX, 40 VS, NO AUTH REQ   Time Calculation  Start Time: 1405  Stop Time: 1444  Time Calculation (min): 39 min    1. Medial epicondylitis of left elbow        2. Right elbow pain            ASSESSMENT:  Session focusing on forearm flexor/extensor strengthening as tolerated, pt had good tolerance and making steady gains. Reviewed multiple gym exercises and discussed how to appropriately monitor sx and tolerance to activities as pt gradually returns to exercise. Pt is still limited by decreased muscle performance, pain, and participation restrictions. Patient continues to be a good candidate for skilled PT in order to further address listed impairments. Updated HEP to reflect today's session. All questions answered.        GOALS:  Patient will improve quickDASH score to meet minimal detectable change of improvement to improve performance of ADLs. (9pts)     Patient will be independent with home exercise program for proper self-management of condition.     Patient will improve pain free active range of motion in deficit areas for ADL completion.     Patient will improve strength in deficit areas so patient can perform ADLs with less pain.     Patient will subjectively report the ability to get through an entire work shift  w/familiar sx </= 1/10 while performing and no significant flare up 24hrs following activity.      Pt will subjectively report the ability to perform gym activities jacqui related to pulling w/familiar sx </= 1/10 while performing and no significant flare up 24hrs following activity.       PLAN:  Cont to progress UE strength w/emphasis on forearm flexor/extensor strength as tolerated.     SUBJECTIVE:  Pt reports the elbow is a little sore. He met w/one ortho yesterday for the L elbow who discussed that his L UCL is torn but he can cont  normal ADLs and strengthening as tolerated. He met w/another ortho MD for the R elbow and is continuing w/the antibiotic for the infection.     OBJECTIVE:  Treatments:  Therapeutic Exercise (60814): 39 minutes  Performing the following to improve elbow flexor/extensor strength of LUE:  UBE - 2min - for dynamic warm up.   Seated Eccentric Wrist flexion w/Dumbbell - 3x10, 12lb DB  Seated Eccentric Wrist Extension w/DB - 3x10, 12lb DB  Forearm Supination w/Weight Bar - 2x10, 4lb  Forearm Pronation w/Weight Bar - 2x10, 4lb  Cybex Seated Row - 60lbs  Cable Bicep Curl Supinated - 5lbs  Cable Bicep Curl Pronated - 5lbs  Cable Tricep Extension (Bent over, kickback) - 5lbs, vc upper arm stable flexing only elbow.   Supine Skullcrusher w/DB - 12lbs, vc to keep upper arm stable flexing only elbow.   Standing Horizontal Cable ABD - Green  Updated HEP to reflect this session  Discussed trial and error process as pt returns to gym as the following; try new exercise w/min resistance, if pain stop exercise and temporarily avoid until strength progresses, if no pain cont to increase difficulty as tolerated.     Manual Therapy (80057):   minutes      Neuromuscular Re-education (51738):   minutes       Therapeutic Activitity (58456):   minutes               HEP:  Access Code: [email or web address]  URL: [email or web address]  Date: 02/07/2025  Prepared by: Tariq Josue     Exercises  - Seated Eccentric Wrist Flexion with Dumbbell  - 1 x daily - 4-5 x weekly - 3 sets - 10 reps  - Seated Eccentric Wrist Extension  - 1 x daily - 4-5 x weekly - 3 sets - 10 reps  - Forearm Supination with Curl Bar - 1 x daily - 4-5 x weekly - 3 sets - 10 reps  - Forearm Pronation with Curl Bar - 1 x daily - 4-5 x weekly - 3 sets - 10 reps  - Standing Shoulder Row with Anchored Resistance  - 1 x daily - 4-5 x weekly - 3 sets - 10 reps  - Standing Shoulder Horizontal Abduction with Resistance  - 1 x daily - 4-5 x weekly - 3 sets - 10 reps GREEN -  BLUE as tolerated  ^  Education and discussion on HEP and treatment regarding the benefits related to current condition, POC, pathophysiology, and precautions

## 2025-02-19 ENCOUNTER — TREATMENT (OUTPATIENT)
Dept: PHYSICAL THERAPY | Facility: CLINIC | Age: 55
End: 2025-02-19
Payer: COMMERCIAL

## 2025-02-19 DIAGNOSIS — M77.02 MEDIAL EPICONDYLITIS OF LEFT ELBOW: Primary | ICD-10-CM

## 2025-02-19 DIAGNOSIS — M25.521 RIGHT ELBOW PAIN: ICD-10-CM

## 2025-02-19 PROCEDURE — 97110 THERAPEUTIC EXERCISES: CPT | Mod: GP

## 2025-02-19 NOTE — PROGRESS NOTES
Physical Therapy Treatment      Patient Name: Eliseo Patricio  MRN: [id number]  Today's Date: 2/19/2025  Visit #: 3  Insurance:  0 COPAY, 10% COINS, 200 DED (MET), 1200 OOP MAX, 40 VS, NO AUTH REQ   Time Calculation  Start Time: 1705  Stop Time: 1743  Time Calculation (min): 38 min    1. Medial epicondylitis of left elbow        2. Right elbow pain              ASSESSMENT:  Pt making small steady gains in strength which is carrying over well as decreased familiar sx w/ADLs. Today's session focused on strength, ROM, and joint mobility, pt had good tolerance. They are still limited by decreased muscle performance, pain, and participation restrictions. Patient continues to be a good candidate for skilled PT in order to further address listed impairments. Updated HEP to reflect today's session. All questions answered.        GOALS:  Patient will improve quickDASH score to meet minimal detectable change of improvement to improve performance of ADLs. (9pts)     Patient will be independent with home exercise program for proper self-management of condition.     Patient will improve pain free active range of motion in deficit areas for ADL completion.     Patient will improve strength in deficit areas so patient can perform ADLs with less pain.     Patient will subjectively report the ability to get through an entire work shift  w/familiar sx </= 1/10 while performing and no significant flare up 24hrs following activity.      Pt will subjectively report the ability to perform gym activities jacqui related to pulling w/familiar sx </= 1/10 while performing and no significant flare up 24hrs following activity.       PLAN:  Cont to progress UE strength w/emphasis on forearm flexor/extensor strength as tolerated.       Tx Considerations:  DB Lat raise   D2  ER/IR   Cont to progress Pushup height, lat raise pulldown/pullup as tolerated.       SUBJECTIVE:  Pt reports things have been good, he was able to get back in the gym for a few  things w/out significant sx. He is having some minimal bicep soreness.  Pt reports his R elbow is getting better and he thinks the infection is mostly cleared up at this point.          OBJECTIVE:  Treatments:  Therapeutic Exercise (26070): 38 minutes  Performing the following to improve elbow flexor/extensor strength of LUE:  UBE - 3min - for dynamic warm up.   Seated Eccentric Wrist flexion w/Dumbbell - 3x10, 12lb DB  Seated Eccentric Wrist Extension w/DB - 3x10, 12lb DB  Forearm Supination w/Weight Bar - 2x10, 4lb  Forearm Pronation w/Weight Bar - 2x10, 4lb  DB Lateral Raise - 2x10, 10lb DB  Cybex Seated Row - 2x10, 60lbs  Cybex Lat Pull Down - 3x10, 70lbs  Cable Bicep Curl Supinated - 2x10, 15lbs  Cable Bicep Curl Pronated - 2x10, 15lbs  Pushup on Elevated Mat Table - 2x10  Updated HEP to reflect this session      Manual Therapy (46493):   minutes  Not performed    Neuromuscular Re-education (12586):   minutes  Not performed    Therapeutic Activitity (38293):   minutes  Not performed            HEP:  Access Code: [email or web address]  URL: [email or web address]  Date: 02/07/2025  Prepared by: Tariq Josue     Exercises  - Seated Eccentric Wrist Flexion with Dumbbell  - 1 x daily - 4-5 x weekly - 3 sets - 10 reps  - Seated Eccentric Wrist Extension  - 1 x daily - 4-5 x weekly - 3 sets - 10 reps  - Forearm Supination with Curl Bar - 1 x daily - 4-5 x weekly - 3 sets - 10 reps  - Forearm Pronation with Curl Bar - 1 x daily - 4-5 x weekly - 3 sets - 10 reps  - Standing Shoulder Row with Anchored Resistance  - 1 x daily - 4-5 x weekly - 3 sets - 10 reps  - Standing Shoulder Horizontal Abduction with Resistance  - 1 x daily - 4-5 x weekly - 3 sets - 10 reps GREEN - BLUE as tolerated  ^  Education and discussion on HEP and treatment regarding the benefits related to current condition, POC, pathophysiology, and precautions

## 2025-02-21 ENCOUNTER — APPOINTMENT (OUTPATIENT)
Dept: ORTHOPEDIC SURGERY | Facility: CLINIC | Age: 55
End: 2025-02-21
Payer: COMMERCIAL

## 2025-02-21 ENCOUNTER — HOSPITAL ENCOUNTER (OUTPATIENT)
Dept: RADIOLOGY | Facility: EXTERNAL LOCATION | Age: 55
Discharge: HOME | End: 2025-02-21

## 2025-02-21 DIAGNOSIS — M17.0 OSTEOARTHRITIS OF BOTH KNEES, UNSPECIFIED OSTEOARTHRITIS TYPE: ICD-10-CM

## 2025-02-21 ASSESSMENT — ENCOUNTER SYMPTOMS
KNEE SWELLING: 1
KNEE DEFORMITY: 1

## 2025-02-21 NOTE — PROGRESS NOTES
Chief Complaint: Follow-up of the Left Knee and Follow-up of the Right Knee  History of Present Illness:  Encounter date: 02/21/2025      Subjective   Eliseo Patricio is a 54 y.o. male who presents for Follow-up of the Left Knee and Follow-up of the Right Knee    Right Knee     Left Knee       2/21/25: Patient returns today with complaint of bilateral knee pain.  He has known bilateral knee DJD and we did perform Euflexxa and PRP injections in the past.  We did complete the injections on 2/16/2024.  His pain is since returned, therefore, he will like to have repeat bilateral knee PRP injections today.  No additional complaints.    2/16/24: Patient returns today for bilateral knee Euflexxa and PRP injections.  He has not yet had significant relief from the previous Euflexxa injections.  No additional complaints.    2/5/24: Patient returns today for bilateral knee Euflexxa injections.  He has not yet had significant relief from the previous injections.  No additional complaints.    1/29/24: Patient returns today for bilateral knee pain.  During his last visit on 11/27/2023, discussed the option of bilateral knee viscosupplementation injection.  He returns today to begin bilateral knee Euflexxa injection series.    11/27/23: 53-year-old male presents with complaint of bilateral knee pain that he had for quite some time but progressive worse in the past few weeks.  Pain is of gradual onset and constant duration with intermittent worsening, moderate severity pain is exacerbated by knee flexion and weightbearing activities.  Pain is temporarily alleviated by rest but pain is associated with stiffness and clicking.  Pain is not associated with acute trauma, deformed, ecchymosis, paresthesias, weakness, rash, erythema, or fevers.  Patient has known bilateral knee DJD, specifically in the patellofemoral compartments.  He has tried physical therapy, multiple analgesics, and previous corticosteroid injection therapy with  limited relief.  Considering this, he presents today to discuss the option of viscosupplementation.    ROS: All pertinent positive symptoms are included in the history of present illness.    All other systems have been reviewed and are negative and noncontributory to this patient's current ailments.    Objective     There were no vitals filed for this visit.      Physical Exam  General/Constitutional: No apparent distress. Well-nourished and well developed.  Head: Normocephalic, Atraumatic.   Eyes: EOMI.  Vascular: No edema, swelling or tenderness, except as noted in detailed exam.  Respiratory: Non-labored breathing.  Integumentary: No impressive skin lesions present, except as noted in detailed exam.  Neurological: Alert and oriented.  Psychological:  Normal mood and affect.  Musculoskeletal: Normal, except as noted in detailed exam.  Right Knee  Flexion 130. Extension 0. Crepitus present with knee flexion/extension. No ecchymosis. Muscle strength 5 out of 5 with flexion and extension. Valgus stress negative. Varus stress negative.   Left Knee  Flexion 130. Extension 0. Crepitus present with knee flexion/extension. No ecchymosis. Muscle strength 5 out of 5 with flexion and extension. Valgus stress negative. Varus stress negative.     Patient ID: Eliseo Patricio is a 54 y.o. male.    Platelet Rich Plasma Injection-right knee joint  Lot: [id number]  Exp: 10/31/2026  Consent  After discussing the various treatment options for the condition,  It was agreed that a platelet rich plasma injection would the next step in treatment.  The nature of and the indications for PRP injections and / or local anaesthetic injection were reviewed in detail with the patient today.  The inherent risks of injection including infection, allergic reaction, increased pain, incomplete relief or temporary relief of symptoms, and nerve injury were discussed.      Procedure  After the risks and benefits of the procedure were explained, consent  was given, and time-out was performed. The [employer] Kenneth system was used with 8mL ACDA, 52cc of blood was drawn from the patient's antecubital fossa.  This blood was spun for 20 minutes in the centrifuge to obtain 2.5 cc of Leukocyte Poor PRP.  The right knee joint and surrounding structures were visualized with ultrasound.   The site for the injection was properly marked and prepped with Chlorhexadine solution.       The needle injection insertion site was anesthetized with ethyl chloride and 1cc of 1% Lidocaine using a 25 gauge 1.5 inch needle.  Using ultrasound guidance, the right knee joint was visualized and the 2.5 cc of PRP was then injected into the joint 21-gauge 1.5 inch needle.      A sterile band-aide was applied.  Post-injection instructions were given regarding post-procedure care, when to follow up in clinic and what to expect from the procedure.  The patient tolerated the injection well and was discharged without complication.      Platelet Rich Plasma Injection-left knee joint  Lot: [id number]  Exp: 10/31/2026  Consent  After discussing the various treatment options for the condition,  It was agreed that a platelet rich plasma injection would the next step in treatment.  The nature of and the indications for PRP injections and / or local anaesthetic injection were reviewed in detail with the patient today.  The inherent risks of injection including infection, allergic reaction, increased pain, incomplete relief or temporary relief of symptoms, and nerve injury were discussed.      Procedure  After the risks and benefits of the procedure were explained, consent was given, and time-out was performed. The [employer] Kenneth system was used with 8mL ACDA, 52cc of blood was drawn from the patient's antecubital fossa.  This blood was spun for 20 minutes in the centrifuge to obtain 2.5 cc of Leukocyte Poor PRP.  The left knee joint and surrounding structures were visualized with ultrasound.   The site for the  injection was properly marked and prepped with Chlorhexadine solution.       The needle injection insertion site was anesthetized with ethyl chloride and 1cc of 1% Lidocaine using a 25 gauge 1.5 inch needle.  Using ultrasound guidance, the left knee joint was visualized and the 2.5 cc of PRP was then injected into the joint 21-gauge 1.5 inch needle.      A sterile band-aide was applied.  Post-injection instructions were given regarding post-procedure care, when to follow up in clinic and what to expect from the procedure.  The patient tolerated the injection well and was discharged without complication.          Assessment/Plan   Problem List Items Addressed This Visit    None  Visit Diagnoses       Osteoarthritis of both knees, unspecified osteoarthritis type        Relevant Orders    Point of Care Ultrasound (Completed)              Discussed risks versus benefits of having injections performed. Patient has elected to proceed with procedures. Please refer to procedure notes above. Discussed with patient to limit weightbearing activities over the next 24-48 hours, they can then progress to full activities as tolerated. Discussed with patient to avoid water submersion over the next 2 days. Discussed with patient to call me immediately if they develop worsening pain, rash, erythema, or fevers. Patient should follow-up as needed if pain persist or worsens.    Osmar Griffin,   Sports Medicine  [facility]     ** Please excuse any errors in grammar or translation related to this dictation. Voice recognition software was utilized to prepare this document. **

## 2025-02-26 ENCOUNTER — TREATMENT (OUTPATIENT)
Dept: PHYSICAL THERAPY | Facility: CLINIC | Age: 55
End: 2025-02-26
Payer: COMMERCIAL

## 2025-02-26 DIAGNOSIS — M25.521 RIGHT ELBOW PAIN: ICD-10-CM

## 2025-02-26 DIAGNOSIS — M77.02 MEDIAL EPICONDYLITIS OF LEFT ELBOW: Primary | ICD-10-CM

## 2025-02-26 PROCEDURE — 97110 THERAPEUTIC EXERCISES: CPT | Mod: GP

## 2025-02-26 NOTE — PROGRESS NOTES
Physical Therapy Treatment      Patient Name: Eliseo Patricio  MRN: [id number]  Today's Date: 2/26/2025  Visit #: 4  Insurance:  0 COPAY, 10% COINS, 200 DED (MET), 1200 OOP MAX, 40 VS, NO AUTH REQ   Time Calculation  Start Time: 1630  Stop Time: 1708  Time Calculation (min): 38 min    1. Medial epicondylitis of left elbow        2. Right elbow pain                ASSESSMENT:  Progressing to wider  overhead strengthening activities to further challenge elbow strength/stability to varus/valgus forces, pt had good tolerance. Cont'd to reinforce concepts of tendon loading and graded exposure w/work and gym routine activities, jacqui related to wide  pulldown, pullups, and tricep extensions, pt receptive. Pt to cont to benefit from skilled PT to improve CLOF.         GOALS:  Patient will improve quickDASH score to meet minimal detectable change of improvement to improve performance of ADLs. (9pts)     Patient will be independent with home exercise program for proper self-management of condition.     Patient will improve pain free active range of motion in deficit areas for ADL completion.     Patient will improve strength in deficit areas so patient can perform ADLs with less pain.     Patient will subjectively report the ability to get through an entire work shift  w/familiar sx </= 1/10 while performing and no significant flare up 24hrs following activity.      Pt will subjectively report the ability to perform gym activities jacqui related to pulling w/familiar sx </= 1/10 while performing and no significant flare up 24hrs following activity.       PLAN:  Cont to progress UE strength w/emphasis on forearm flexor/extensor strength as tolerated.       Tx Considerations:  DB Lat raise   D2  ER/IR   Cont to progress Pushup height, lat raise pulldown/pullup as tolerated.       SUBJECTIVE:  Pt reports the R elbow has been most painful as of late. The L is doing good and he cont to get back into his normal gym routines.         OBJECTIVE:  Treatments:  Therapeutic Exercise (42225): 38 minutes  Performing the following to improve elbow flexor/extensor strength of LUE:  UBE - 3min - for dynamic warm up.   Shoulder ER - x15 ea UE, 2.5lbs  Shoulder IR -x15 ea UE 2.5lbs  Shoulder ER in 90/90 - to challenge elbow stability of varus/valgus in overhead positioning x15 ea UE, 2.5lbs  Shoulder IR in 90/90 -  to challenge elbow stability of varus/valgus in overhead positioning - x15 ea UE, 2.5lbs  Cybex Lat Pull Down - 3x10, 90lbs  Cybex Lat Pull Down - wide  on thick arm portion of machine - 2x10, 90lbs   Seated Cable Pulldown w/Widest Bar  - 2x10, weight as high as tolerated by elbow.   Cable Rope Bicep Curl - 2x10, 22.5lbs  Cable Rope Tricep Extension -2x10, 22.5lbs  Updated HEP to reflect this session      Manual Therapy (99094):   minutes  Not performed    Neuromuscular Re-education (70907):   minutes  Not performed    Therapeutic Activitity (87097):   minutes  Not performed            HEP:  Access Code: [email or web address]  URL: [email or web address]  Date: 02/07/2025  Prepared by: Tariq Josue     Exercises  - Seated Eccentric Wrist Flexion with Dumbbell  - 1 x daily - 4-5 x weekly - 3 sets - 10 reps  - Seated Eccentric Wrist Extension  - 1 x daily - 4-5 x weekly - 3 sets - 10 reps  - Forearm Supination with Curl Bar - 1 x daily - 4-5 x weekly - 3 sets - 10 reps  - Forearm Pronation with Curl Bar - 1 x daily - 4-5 x weekly - 3 sets - 10 reps  - Standing Shoulder Row with Anchored Resistance  - 1 x daily - 4-5 x weekly - 3 sets - 10 reps  - Standing Shoulder Horizontal Abduction with Resistance  - 1 x daily - 4-5 x weekly - 3 sets - 10 reps GREEN - BLUE as tolerated  ^  Education and discussion on HEP and treatment regarding the benefits related to current condition, POC, pathophysiology, and precautions

## 2025-03-04 DIAGNOSIS — G47.33 OBSTRUCTIVE SLEEP APNEA: Primary | ICD-10-CM

## 2025-03-04 PROBLEM — M70.21 OLECRANON BURSITIS OF RIGHT ELBOW: Status: ACTIVE | Noted: 2025-03-04

## 2025-03-06 LAB
T4 FREE SERPL-MCNC: 1.3 NG/DL (ref 0.8–1.8)
TSH SERPL-ACNC: 4.64 MIU/L (ref 0.4–4.5)

## 2025-03-12 ENCOUNTER — OFFICE VISIT (OUTPATIENT)
Dept: ORTHOPEDIC SURGERY | Facility: CLINIC | Age: 55
End: 2025-03-12
Payer: COMMERCIAL

## 2025-03-12 ENCOUNTER — APPOINTMENT (OUTPATIENT)
Dept: PHYSICAL THERAPY | Facility: CLINIC | Age: 55
End: 2025-03-12
Payer: COMMERCIAL

## 2025-03-12 DIAGNOSIS — M25.521 RIGHT ELBOW PAIN: ICD-10-CM

## 2025-03-12 DIAGNOSIS — M77.02 MEDIAL EPICONDYLITIS OF LEFT ELBOW: Primary | ICD-10-CM

## 2025-03-12 DIAGNOSIS — M70.21 OLECRANON BURSITIS OF RIGHT ELBOW: Primary | ICD-10-CM

## 2025-03-12 PROCEDURE — 1036F TOBACCO NON-USER: CPT | Performed by: ORTHOPAEDIC SURGERY

## 2025-03-12 PROCEDURE — 99213 OFFICE O/P EST LOW 20 MIN: CPT | Performed by: ORTHOPAEDIC SURGERY

## 2025-03-12 PROCEDURE — 97110 THERAPEUTIC EXERCISES: CPT | Mod: GP

## 2025-03-12 NOTE — Clinical Note
March 13, 2025     Faizan Purvis MD  [address]  [facility], [address] [address]    Patient: Eliseo Patricio   YOB: 1970   Date of Visit: 3/12/2025       Dear Dr. Faizan Purvis MD:    Thank you for referring Eliseo Patricio to me for evaluation. Below are my notes for this consultation.  If you have questions, please do not hesitate to call me. I look forward to following your patient along with you.       Sincerely,     Alban Blanchard MD      CC: No Recipients  ______________________________________________________________________________________

## 2025-03-12 NOTE — LETTER
finger flexion extension with good intrinsic plus and minus posture.  Symmetric wrist and forearm motion.  Just a little palpable synovitis within the olecranon bursa.  No significant fluid accumulation.  No sites of drainage.  No further sign of infection.  There is still little discomfort with palpation over the medial and lateral epicondyles, reproduced appropriately with resisted wrist flexion or extension, but only when the elbow is extended.  Negative long finger extension test.  Stable collaterals.  No joint effusion.  Sensation intact to light touch in all distributions.  Capillary refill less than 2 seconds.      IMAGING / LABS / EMGs    None today      Electronically Signed      KB Blanchard MD      Orthopaedic Hand Surgery      [phone] Yes

## 2025-03-12 NOTE — PROGRESS NOTES
CHIEF COMPLAINT         Right elbow pain    ASSESSMENT + PLAN    Resolving right olecranon bursitis with overlying cellulitis    At this point the cellulitic component appears to be resolved.  Watch for the warning signs of recurrent infection that I reviewed.  Contact my office if you should notice any of these.  Otherwise, continue advancing activities with no particular restrictions.  Consider continued use of the strap to reduce the epicondylitis symptoms.  These are unrelated to the infection.    Follow-up with me with any additional concerns.        HISTORY OF PRESENT ILLNESS       Patient returns today, about 5 weeks after last visit, for a check on his right olecranon bursitis with overlying cellulitis and baseline medial and lateral epicondylitis.  He has completed the course of Bactrim as directed.  Pain has markedly decreased in the posterior elbow.  No drainage.  No fevers or chills.  He does continue with the mild medial and lateral discomfort with lifting.  No popping, clicking, or subjective instability.      PHYSICAL EXAM       He remains well-developed, well-nourished male in no acute distress.  He appears his stated age and has a pleasant affect.  Skin of the right elbow and forearm is intact with no erythema, ecchymosis, or diffuse swelling.  Normal skin drag and coloration.  Full composite finger flexion extension with good intrinsic plus and minus posture.  Symmetric wrist and forearm motion.  Just a little palpable synovitis within the olecranon bursa.  No significant fluid accumulation.  No sites of drainage.  No further sign of infection.  There is still little discomfort with palpation over the medial and lateral epicondyles, reproduced appropriately with resisted wrist flexion or extension, but only when the elbow is extended.  Negative long finger extension test.  Stable collaterals.  No joint effusion.  Sensation intact to light touch in all distributions.  Capillary refill less than 2  seconds.      IMAGING / LABS / EMGs    None today      Electronically Signed      KB Blanchard MD      Orthopaedic Hand Surgery      [phone]

## 2025-03-12 NOTE — PROGRESS NOTES
Physical Therapy Treatment      Patient Name: Eliseo Patricio  MRN: [id number]  Today's Date: 3/12/2025  Visit #: 5  Insurance:  0 COPAY, 10% COINS, 200 DED (MET), 1200 OOP MAX, 40 VS, NO AUTH REQ   Time Calculation  Start Time: 1635  Stop Time: 1714  Time Calculation (min): 39 min    1. Medial epicondylitis of left elbow        2. Right elbow pain                  ASSESSMENT:  Reassured pt to cont graded exposure and progressive strengthening. Cont discussing how to appropriately/gradually make progressions w/gym and work activities to avoid overuse related sx provocation, pt receptive.  Pt is still limited by decreased muscle performance, pain, and participation restrictions. Patient continues to be a good candidate for skilled PT in order to further address listed impairments and improve CLOF.           GOALS:  Patient will improve quickDASH score to meet minimal detectable change of improvement to improve performance of ADLs. (9pts)     Patient will be independent with home exercise program for proper self-management of condition.     Patient will improve pain free active range of motion in deficit areas for ADL completion.     Patient will improve strength in deficit areas so patient can perform ADLs with less pain.     Patient will subjectively report the ability to get through an entire work shift  w/familiar sx </= 1/10 while performing and no significant flare up 24hrs following activity.      Pt will subjectively report the ability to perform gym activities jacqui related to pulling w/familiar sx </= 1/10 while performing and no significant flare up 24hrs following activity.       PLAN:  Cont to progress UE strength w/emphasis on forearm flexor/extensor strength as tolerated.       Tx Considerations:  DB Lat raise   D2  ER/IR   Cont to progress Pushup height, lat raise pulldown/pullup as tolerated.       SUBJECTIVE:  Pt reports he recently had quite a severe flare up of elbow pain that is just now starting to  get back to baseline. 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 His ortho doctor confirmed this was likely the case as well and that a more serious injury did not occur.        OBJECTIVE:  Treatments:  Therapeutic Exercise (68890):   minutes  Performing the following to improve elbow flexor/extensor strength of LUE:  UBE - 5min - for dynamic warm up.   Forearm Supination w/Weight Bar - 2x10, 4lb  Forearm Pronation w/Weight Bar - 2x10, 4lb  Shoulder ER in 90/90 - to challenge elbow stability of varus/valgus in overhead positioning x15 ea UE, 2.5lbs  Cybex Lat Pull Down - wide  on thick arm portion of machine - 2x10, 90lbs   Cybex Seated Row - 2x10, 60lbs  Front Raise w/DB - 2x10, 5lb DBs  Lateral Raise w/DB - 2x10, 5lb DBs  Cont discussing how to appropriately/gradually make progressions w/gym and work activities to avoid overuse related sx provocation, pt receptive.    Review of HEP       Manual Therapy (12781):   minutes  Not performed    Neuromuscular Re-education (95942):   minutes  Not performed    Therapeutic Activitity (36473):   minutes  Not performed            HEP:  Access Code: [email or web address]  URL: [email or web address]  Date: 02/07/2025  Prepared by: Tariq Josue     Exercises  - Seated Eccentric Wrist Flexion with Dumbbell  - 1 x daily - 4-5 x weekly - 3 sets - 10 reps  - Seated Eccentric Wrist Extension  - 1 x daily - 4-5 x weekly - 3 sets - 10 reps  - Forearm Supination with Curl Bar - 1 x daily - 4-5 x weekly - 3 sets - 10 reps  - Forearm Pronation with Curl Bar - 1 x daily - 4-5 x weekly - 3 sets - 10 reps  - Standing Shoulder Row with Anchored Resistance  - 1 x daily - 4-5 x weekly - 3 sets - 10 reps  - Standing Shoulder Horizontal Abduction with Resistance  - 1 x daily - 4-5 x weekly - 3 sets - 10 reps GREEN - BLUE as tolerated  ^  Education and discussion on HEP and treatment regarding the benefits related to current  condition, POC, pathophysiology, and precautions

## 2025-03-19 ENCOUNTER — APPOINTMENT (OUTPATIENT)
Facility: CLINIC | Age: 55
End: 2025-03-19
Payer: COMMERCIAL

## 2025-03-26 ENCOUNTER — APPOINTMENT (OUTPATIENT)
Dept: PHYSICAL THERAPY | Facility: CLINIC | Age: 55
End: 2025-03-26
Payer: COMMERCIAL

## 2025-03-26 DIAGNOSIS — M25.521 RIGHT ELBOW PAIN: ICD-10-CM

## 2025-03-26 DIAGNOSIS — M77.02 MEDIAL EPICONDYLITIS OF LEFT ELBOW: Primary | ICD-10-CM

## 2025-03-26 PROCEDURE — 97110 THERAPEUTIC EXERCISES: CPT | Mod: GP

## 2025-03-26 NOTE — PROGRESS NOTES
Physical Therapy Treatment      Patient Name: Eliseo Patricio  MRN: [id number]  Today's Date: 3/26/2025  Visit #: 6  Insurance:  0 COPAY, 10% COINS, 200 DED (MET), 1200 OOP MAX, 40 VS, NO AUTH REQ   Time Calculation  Start Time: 1619  Stop Time: 1657  Time Calculation (min): 38 min    1. Medial epicondylitis of left elbow        2. Right elbow pain                    ASSESSMENT:  Session continuing to focus on forearm/ strength, pt tolerating well this date. He is making steady gains which are having good carryover to sx free ADLs, but will need to cont to strengthen to avoid recurrence of sx. At this point he has some apprehension to completely return to normalcy. He benefits from cont discussion and performance in sessions of modified/regressed activities for gradual reintroduction. Pt to cont to benefit from skilled PT to improve CLOF.         GOALS:  Patient will improve quickDASH score to meet minimal detectable change of improvement to improve performance of ADLs. (9pts)     Patient will be independent with home exercise program for proper self-management of condition.     Patient will improve pain free active range of motion in deficit areas for ADL completion.     Patient will improve strength in deficit areas so patient can perform ADLs with less pain.     Patient will subjectively report the ability to get through an entire work shift  w/familiar sx </= 1/10 while performing and no significant flare up 24hrs following activity.      Pt will subjectively report the ability to perform gym activities jacqui related to pulling w/familiar sx </= 1/10 while performing and no significant flare up 24hrs following activity.       PLAN:  Cont to progress UE strength w/emphasis on forearm flexor/extensor strength as tolerated.       Tx Considerations:  DB Lat raise   D2  ER/IR   Cont to progress Pushup height, lat raise pulldown/pullup as tolerated.       SUBJECTIVE:  Pt reports he is doing very well. Since he was here  last he has had little to no pain but has been taking it easy in the gym. He is fearful of progressing weight and returning to pullups, and tricep overhead extensions.          OBJECTIVE:  Treatments:  Therapeutic Exercise (61111): 38 minutes  Performing the following to improve elbow flexor/extensor strength of LUE:  UBE - 5min - for dynamic warm up.   Seated Cable Pulldown w/Widest Bar  - 2x10, weight as high as tolerated by elbow.   Overhead Tricep Extension w/Cable - 27.5lbs  Pushups on incline mat table - x10  Pushups on Ground w/Handles - 2x10  Front Raise w/DB - 2x10, 7lb DBs  Lateral Raise w/DB - 2x10, 7lb DBs  Cont discussing how to appropriately/gradually make progressions w/gym and work activities to avoid overuse related sx provocation. Discussed specific gym activities including pushups, overhead tricep ext, and pullups today - pt receptive and in agreement to all discussed.    Review of HEP - modified dosage to perform only on days he does not go to gym.       Manual Therapy (33062):   minutes  Not performed    Neuromuscular Re-education (63312):   minutes  Not performed    Therapeutic Activitity (76264):   minutes  Not performed            HEP:  Access Code: [email or web address]  URL: [email or web address]  Date: 02/07/2025  Prepared by: Tariq Josue     Exercises  - Seated Eccentric Wrist Flexion with Dumbbell  - 1 x daily - 4-5 x weekly - 3 sets - 10 reps  - Seated Eccentric Wrist Extension  - 1 x daily - 4-5 x weekly - 3 sets - 10 reps  - Forearm Supination with Curl Bar - 1 x daily - 4-5 x weekly - 3 sets - 10 reps  - Forearm Pronation with Curl Bar - 1 x daily - 4-5 x weekly - 3 sets - 10 reps  - Standing Shoulder Horizontal Abduction with Resistance  - 1 x daily - 4-5 x weekly - 3 sets - 10 reps GREEN - BLUE as tolerated  ^  Education and discussion on HEP and treatment regarding the benefits related to current condition, POC, pathophysiology, and precautions

## 2025-03-27 ENCOUNTER — OFFICE VISIT (OUTPATIENT)
Dept: ORTHOPEDIC SURGERY | Facility: CLINIC | Age: 55
End: 2025-03-27
Payer: COMMERCIAL

## 2025-03-27 ENCOUNTER — HOSPITAL ENCOUNTER (OUTPATIENT)
Dept: RADIOLOGY | Facility: CLINIC | Age: 55
Discharge: HOME | End: 2025-03-27
Payer: COMMERCIAL

## 2025-03-27 DIAGNOSIS — M25.551 RIGHT HIP PAIN: ICD-10-CM

## 2025-03-27 DIAGNOSIS — M47.816 ARTHRITIS OF FACET JOINT OF LUMBAR SPINE: Primary | ICD-10-CM

## 2025-03-27 PROCEDURE — 1036F TOBACCO NON-USER: CPT | Performed by: ORTHOPAEDIC SURGERY

## 2025-03-27 PROCEDURE — 73502 X-RAY EXAM HIP UNI 2-3 VIEWS: CPT | Mod: RT

## 2025-03-27 PROCEDURE — 99213 OFFICE O/P EST LOW 20 MIN: CPT | Performed by: ORTHOPAEDIC SURGERY

## 2025-03-27 PROCEDURE — 99203 OFFICE O/P NEW LOW 30 MIN: CPT | Performed by: ORTHOPAEDIC SURGERY

## 2025-03-27 RX ORDER — CYCLOBENZAPRINE HCL 10 MG
10 TABLET ORAL 3 TIMES DAILY PRN
Qty: 30 TABLET | Refills: 0 | Status: SHIPPED | OUTPATIENT
Start: 2025-03-27 | End: 2025-04-06

## 2025-03-27 RX ORDER — PREDNISONE 10 MG/1
TABLET ORAL
Qty: 30 TABLET | Refills: 0 | Status: SHIPPED | OUTPATIENT
Start: 2025-03-27

## 2025-03-27 NOTE — PROGRESS NOTES
History of present: History right lumbar sacral spine low back pain pain goes over the back buttock area down the leg to the knee he occasionally gets a little wraparound pain in the front of the hip but minimal in nature he has no pain over the greater trochanteric process        Past medical history:    The patient's past medical history, family history, social history and review of systems were documented on the patient's medical intake form.  The medical intake form was reviewed and scanned into the electronic medical record for future use.  History is otherwise negative except as stated in the history of present illness.    Physical exam:    General: Alert and oriented to person place and time.  No acute distress and breathing comfortably, pleasant and cooperative with examination.    Head and neck exam: Head is normocephalic atraumatic.    Neck: Supple no visible swelling or deformity.    Cardiovascular: Good perfusion to affected extremities without signs or symptoms of chest pain.    Lungs no audible wheezing or labored breathing on examination.    Abdominal exam: Nondistended nontender    Extremities: Hip exam is rather benign no pain with flexion abduction internal rotation full range of motion no internal pathology no internal impingement he at this time has no pain over the greater trochanteric process reproducible pain lumbar sacral spine pain with straight leg past about 60 degrees he has a lot of muscle spasm and tightness he can move his toes foot and ankle extensor maxim intact good cap refill good pulses no bladder or bowel change no obvious herniation no gross sciatic symptoms but still mechanical back pain lumbar sacral junction in nature        Diagnostic studies: X-rays of the hip are essentially completely unremarkable there was a very small pincer spur      Impression: Lumbar sacral spine probable degenerative change with pain      Plan: PT therapy rehab program steroid Dosepak    Stretching  both hamstrings and low back and core strengthening    We will see him back as needed he will follow-up with our spine team after therapy and steroid dose program for appropriate imaging if his pain pattern changes or becomes more in the hip or groin would be happy to see him back

## 2025-03-31 ENCOUNTER — HOSPITAL ENCOUNTER (OUTPATIENT)
Dept: RADIOLOGY | Facility: CLINIC | Age: 55
Discharge: HOME | End: 2025-03-31
Payer: COMMERCIAL

## 2025-03-31 ENCOUNTER — OFFICE VISIT (OUTPATIENT)
Dept: ORTHOPEDIC SURGERY | Facility: CLINIC | Age: 55
End: 2025-03-31
Payer: COMMERCIAL

## 2025-03-31 DIAGNOSIS — M25.562 PAIN IN BOTH KNEES, UNSPECIFIED CHRONICITY: ICD-10-CM

## 2025-03-31 DIAGNOSIS — S83.90XS SPRAIN OF KNEE, SEQUELA: ICD-10-CM

## 2025-03-31 DIAGNOSIS — M25.561 PAIN IN BOTH KNEES, UNSPECIFIED CHRONICITY: ICD-10-CM

## 2025-03-31 PROCEDURE — 73564 X-RAY EXAM KNEE 4 OR MORE: CPT | Mod: 50

## 2025-03-31 PROCEDURE — 99213 OFFICE O/P EST LOW 20 MIN: CPT | Performed by: ORTHOPAEDIC SURGERY

## 2025-03-31 PROCEDURE — 99214 OFFICE O/P EST MOD 30 MIN: CPT | Performed by: ORTHOPAEDIC SURGERY

## 2025-03-31 NOTE — PROGRESS NOTES
History: Eliseo is here for both knees.  He has known arthritic change and has been seeing Dr. Griffin.  He has tried Euflexxa injections as well as PRP injections.  His last PRP injection was about 5 weeks ago and he does not feel like he is gotten as much relief as he has in the past.  The left knee is worse than the right.  He is getting more pain on the outside especially with deep squats or bends.  He is here today as a new patient.    Past medical history: Multiple  Medications: Multiple  Allergies: No known drug allergies    Please refer to the intake H&P regarding the patient's review of systems, family history and social history as was done today    HEENT: Normal  Lungs: Clear to auscultation  Heart: RRR  Abdomen: Soft, nontender  Skin: clear  Extremity: He has full range of motion to the knees.  He has pain laterally with full flexion on the left knee.  There is a positive posterior Kavitha maneuver.  There is mild tenderness around Gerdy's tubercle.  There is also pain around the patella with ballottement.  1+ crepitus with range of motion.  Negative Lachman and posterior drawer.  No numbness or tingling.  Contralateral exam is normal for strength, motion, stability and neurovascular assessment.    Radiographs: Bilateral knee x-rays show moderate patellofemoral joint space narrowing.  There is fairly mild medial joint space narrowing with neutral alignment.    Assessment: Mild to moderate left greater than right knee DJD, cannot rule out internal derangement.    Plan: We discussed several options for treatment.  He did not get long relief with cortisone injections.  He feels like the gel and PRP have helped him more in the past and he would like to consider trying these injections again.  He has not had an MRI of either knee recently and we could certainly consider further imaging especially on the left knee to rule out any internal derangement.  All questions were answered today with the  patient.    For complete plan and/or surgical details, please refer to Dr. San's portion of this split/shared dictation.     Xi Gaming PA-C    In a face-to-face encounter today, I Des San MD performed a history and physical examination, discussed pertinent diagnostic studies if indicated, and discussed diagnosis and management strategies with both the patient and the midlevel provider.  I reviewed the midlevel's note and agree with the documented findings and plan of care.  Greater than 50% of the evaluation and treatment decision was performed by the physician myself during today's visit.    Eliseo is having some persistent pain in the outside of the knees, worse on the left than the right.  He has tried extensive therapy, bracing and medications as well as multiple injections.  He got the most relief with a combination of gel and PRP but the last PRP injection did not help as much.  Given his persistent pain and mechanical symptoms have recommended an MRI of the knees.  We can then see him back to go through results.  If negative we can certainly pursue gel or other options as noted.      This note was generated with voice recognition software and may contain grammatical errors.

## 2025-04-08 ENCOUNTER — APPOINTMENT (OUTPATIENT)
Dept: NEUROSURGERY | Facility: CLINIC | Age: 55
End: 2025-04-08
Payer: COMMERCIAL

## 2025-04-08 VITALS — WEIGHT: 226.8 LBS | HEIGHT: 74 IN | BODY MASS INDEX: 29.11 KG/M2

## 2025-04-08 DIAGNOSIS — M54.16 LUMBAR RADICULOPATHY: Primary | ICD-10-CM

## 2025-04-08 PROCEDURE — 99204 OFFICE O/P NEW MOD 45 MIN: CPT | Performed by: PHYSICIAN ASSISTANT

## 2025-04-08 PROCEDURE — 1036F TOBACCO NON-USER: CPT | Performed by: PHYSICIAN ASSISTANT

## 2025-04-08 PROCEDURE — 3008F BODY MASS INDEX DOCD: CPT | Performed by: PHYSICIAN ASSISTANT

## 2025-04-08 RX ORDER — CYCLOBENZAPRINE HCL 10 MG
10 TABLET ORAL 3 TIMES DAILY PRN
Qty: 90 TABLET | Refills: 0 | Status: SHIPPED | OUTPATIENT
Start: 2025-04-08 | End: 2025-05-08

## 2025-04-08 NOTE — PROGRESS NOTES
[facility] [place]  Department of Neurological Surgery  New Patient Visit    History of Present Illness:  Eliseo Patricio is a 54 y.o. year old male who presents to the spine clinic with chronic and worsening low back pain with left lower radiculopathy.  Patient denies any inciting incident leading the onset of pain approximately 5 months ago.  This is gradually worsened since and over the past month he has missed work on occasion secondary to symptoms.  Mid to lower back pain extends into his right hip but also associated with numbness and tingling and weakness in left lower extremity.  He has progressed through massage and chiropractor including use of electric stim, topical Voltaren, and inversion table with minimal improvement.  Sought care at orthopedic specialist recently with steroids begun as well as muscle relaxers and mild comfort improvement identified.  He denies bowel bladder incontinence.  Has also been utilizing ibuprofen and Tylenol with minimal effectiveness.     Prior Spine Surgeries: none    Physical Therapy: ordered  Diabetic: Diabetes Composite Score: 5   Values used to calculate this score:    Points  Metrics       1        Blood Pressure: 125/64       1        Prescribed Statins: N/A - patient does not meet statin therapy criteria       1        Hemoglobin A1c: 5.7%       1        Smokes Tobacco: No       1        Prescribed Aspirin: N/A - patient does not meet cardiovascular risk criteria       Osteoporosis: no  Patient's BMI is Body mass index is 29.12 kg/m².    14/14 systems reviewed and negative other than what is listed in the history of present illness    Patient Active Problem List   Diagnosis    Type 2 diabetes mellitus    Primary hypothyroidism    Fibromyalgia    GERD (gastroesophageal reflux disease)    Asthma    Acute bronchitis    Allergic rhinitis    Anterior pleuritic pain    Arthralgia    Arthritis    Autoimmune disease (Multi)    Villegas esophagus    Blurred  vision    Bilateral knee pain    Carpal tunnel syndrome of right wrist    Cervical radiculopathy    Chest pain    Elevated blood pressure reading without diagnosis of hypertension    Erectile dysfunction    Fatigue    Hyperglycemia    Injury of neck    Lateral epicondylitis of left elbow    Ankle pain    Lower back pain    Achilles tendinitis    Muscle weakness    Neuropathic pain    Numbness of hand    Achillodynia    Periumbilical abdominal pain    Photopsia    Primary insomnia    Rectal hemorrhage    Regular astigmatism of both eyes    Pain of right upper extremity    Tinnitus of both ears    Tubular adenoma of colon    Undifferentiated connective tissue disease (Multi)    Vitamin D deficiency    Elbow pain, right    Medial epicondylitis of right elbow    History of colonic polyps    Medial epicondylitis of left elbow    Right elbow pain    Olecranon bursitis of right elbow     No past medical history on file.  Past Surgical History:   Procedure Laterality Date    OTHER SURGICAL HISTORY  12/12/2019    Echo tooth extraction    OTHER SURGICAL HISTORY  12/12/2019    Tonsillectomy     Social History     Tobacco Use    Smoking status: Former     Types: Cigarettes     Passive exposure: Never    Smokeless tobacco: Never   Substance Use Topics    Alcohol use: Not Currently     family history is not on file.    Current Outpatient Medications:     cyclobenzaprine (Flexeril) 10 mg tablet, Take 1 tablet (10 mg) by mouth 3 times a day as needed for muscle spasms for up to 10 days., Disp: 30 tablet, Rfl: 0    cyclobenzaprine (Flexeril) 10 mg tablet, Take 1 tablet (10 mg) by mouth 3 times a day as needed for muscle spasms., Disp: 90 tablet, Rfl: 0    fluticasone (Flonase) 50 mcg/actuation nasal spray, Administer 2 sprays into each nostril once daily., Disp: 48 mL, Rfl: 3    levothyroxine (Synthroid, Levoxyl) 150 mcg tablet, Take 1 tablet (150 mcg) by mouth early in the morning.. Take on an empty stomach at the same time each  day, either 30 to 60 minutes prior to breakfast, Disp: 90 tablet, Rfl: 3    predniSONE (Deltasone) 10 mg tablet, 50MG FOR 2 DAYS, 40MG FOR 2 DAYS, 30MG FOR 2 DAYS, 20MG FOR 2 DAYS, 10MG FOR 2 DAYS, Disp: 30 tablet, Rfl: 0    testosterone cypionate (Depo-Testosterone) 200 mg/mL injection, Inject 1 mL (200 mg) into the muscle every 14 (fourteen) days. Do not fill before January 22, 2025., Disp: 5 mL, Rfl: 1  Allergies   Allergen Reactions    Penicillins Hives       Physical Examination    General: Well developed, awake/alert/oriented x3, no distress, alert and cooperative  Skin: Warm and dry, no lesions, no rashes  ENMT: Mucous membranes moist, no apparent injury, no lesions seen  Head/Neck: Neck Supple, no apparent injury  Respiratory/Thorax: Normal breath sounds with good chest expansion, thorax symmetric  Cardiovascular: No pitting edema, no JVD    Motor Strength: 4/5 left knee flexion, left hip extension, otherwise 5/5 Throughout all extremities    Muscle Bulk: Normal and symmetric in all extremities    Posture:   -- Cervical: Normal  -- Thoracic: Normal  -- Lumbar : Normal  Paraspinal muscle spasm/tenderness absent.   Midline tenderness absent    Sensation: Mild deficit to sensation medially and posteriorly through left thigh continuing to calf and medial foot, otherwise intact to light touch         Assessment and Plan:  Eliseo Patricio is a 54 y.o. year old male who presents to the spine clinic with chronic and worsening low back pain with left lower radiculopathy.  Patient denies any inciting incident leading the onset of pain approximately 5 months ago.  This is gradually worsened since and over the past month he has missed work on occasion secondary to symptoms.  Mid to lower back pain extends into his right hip but also associated with numbness and tingling and weakness in left lower extremity.  He has progressed through massage and chiropractor including use of electric stim, topical Voltaren, and inversion  table with minimal improvement.  Sought care at orthopedic specialist recently with steroids begun as well as muscle relaxers and mild comfort improvement identified.  He denies bowel bladder incontinence.  Has also been utilizing ibuprofen and Tylenol with minimal effectiveness.     Beginning conservative treatments and recommending physical therapy for core strengthening as well as muscle relaxer for continued comfort and ibuprofen plus APAP for pain relief.  MRI will be needed to evaluate soft tissue/neurological impact leading to his unilateral numbness and weakness on exam.  Would also like to obtain x-rays with flexion-extension to evaluate overall alignment and dynamic stability.  Discussed follow-up with attending neurosurgeon versus pain management.      I have reviewed all prior documentation and reviewed the electronic medical record since admission. I have personally have reviewed all advanced imaging not just the reports and used my interpretation as documented as the relevant findings. I have reviewed the risks and benefits of all treatment recommendations listed in this note with the patient and family.       The above clinical summary has been dictated with voice recognition software. It has not been proofread for grammatical errors, typographical mistakes, or other semantic inconsistencies.    Thank you for visiting our office today. It was our pleasure to take part in your healthcare.     Do not hesitate to call with any questions regarding your plan of care after leaving at [phone] M-F 8am-4pm.     To clinicians, thank you very much for this kind referral. It is a privilege to partner with you in the care of your patients. My office would be delighted to assist you with any further consultations or with questions regarding the plan of care outlined. Do not hesitate to call the office or contact me directly.       Sincerely,      Tomy Aguirre, KELSIE, PA-C  Associate Physician Assistant,  Neurosurgery  Clinical   [facility] [employer]    [facility]  [address] [address]  [address] [place], [place] [address]    Phone: [phone]  Fax: [phone]

## 2025-04-21 ENCOUNTER — APPOINTMENT (OUTPATIENT)
Dept: PHYSICAL THERAPY | Facility: CLINIC | Age: 55
End: 2025-04-21
Payer: COMMERCIAL

## 2025-04-21 DIAGNOSIS — M54.50 CHRONIC RIGHT-SIDED LOW BACK PAIN WITHOUT SCIATICA: Primary | ICD-10-CM

## 2025-04-21 DIAGNOSIS — G89.29 CHRONIC RIGHT-SIDED LOW BACK PAIN WITHOUT SCIATICA: Primary | ICD-10-CM

## 2025-04-21 PROCEDURE — 97164 PT RE-EVAL EST PLAN CARE: CPT | Mod: GP

## 2025-04-21 PROCEDURE — 97110 THERAPEUTIC EXERCISES: CPT | Mod: GP

## 2025-04-21 NOTE — PROGRESS NOTES
Physical Therapy Re-Eval       Patient Name: Eliseo Patricio  MRN: [id number]  Today's Date: 4/21/2025  Visit #: 7  Insurance:  0 COPAY, 10% COINS, 200 DED (MET), 1200 OOP MAX, 40 VS, NO AUTH REQ   Time Calculation  Start Time: 1622  Stop Time: 1704  Time Calculation (min): 42 min    1. Chronic right-sided low back pain without sciatica              ASSESSMENT:  Pt's familiar elbow/forearm sx have completely resolved at this time. Pt now presenting w/new complaint of LBP. Examination findings are consistent w/acute flare up of chronic LBP w/movement coordination impairments. He subjectively reports radicular sx, however, unable to provoke any amount at this time. These impairments limit pt's ability to perform sx free household, and occupational ADLs jacqui related to lifting, therefore, pt to benefit from skilled PT to improve CLOF.         GOALS:  Elbow:   Patient will improve quickDASH score to meet minimal detectable change of improvement to improve performance of ADLs. (9pts) not tested but likely met     Patient will be independent with home exercise program for proper self-management of condition.  met     Patient will improve pain free active range of motion in deficit areas for ADL completion. met     Patient will improve strength in deficit areas so patient can perform ADLs with less pain. met     Patient will subjectively report the ability to get through an entire work shift  w/familiar sx </= 1/10 while performing and no significant flare up 24hrs following activity. met     Pt will subjectively report the ability to perform gym activities jacqui related to pulling w/familiar sx </= 1/10 while performing and no significant flare up 24hrs following activity.  met      Low Back:  Patient will be independent with home exercise program for proper self-management of condition.      Patient will improve pain free active range of motion in deficit areas for ADL completion.      Patient will improve strength in deficit  "areas so patient can perform ADLs with less pain.     Shoulder strength, endurance and motor control will improve while performing activities, such as, lifting, carrying and putting down 20lbs for occupational ADLs.     Patient will subjectively report the ability to get through an entire work shift  w/familiar sx </= 1/10 while performing and no significant flare up 24hrs following activity.      Pt will subjectively report the ability to perform gym activities jacqui related to lifting weights w/familiar sx </= 1/10 while performing and no significant flare up 24hrs following activity.       PLAN:  1x/week for 6 weeks.       SUBJECTIVE:  Pt reports the arms are \"rock solid now.\" He has been lifting and working out fairly hard as of late and other than muscle soreness had no sx. Pt reports he feels like he is moving about 90-95% of the weight he was originally using in the gym.     Re-Assessment for LBP: Pt reports the LBP started about 8-9months ago. He noticed it when he went to lift heavy items from the floor. It progressively got worse and worse. He went to a chiropractor for a few visits but it didn't help much. The e-stim provided some relief as well biofreeze and voltaren. He gets some R hip pain, and has L foot numbness.   Aggravating factors: Provoking factors are bending over, and lifting.  Alleviating Factors include: Steroids helped a lot. A hot shower, and inversion table.      LBP Pain: 3/10 current    OBJECTIVE:  Re-Eval:   Lower Quarter Screen:  DTR (L/R)  Patellar L4: 2+/2+  Achilles S1: 2+/2+    Dermatomes intact BLE    LE Strength Screening:   RLE LLE   L2 Hip Flex 4/5 4/5   L3 Knee Ext 4/5 4/5   S2 Knee Flex 4/5 4/5   L4 Ankle DF 4/5 4/5   S1 Ankle PF 4/5 4-/5       Lumbar ROM  Flexion: No loss  Extension: Mod loss - pain provocation  Rotation (L/R): Mod loss/Mod loss - Pain provocation L>R bilaterally.   Negative    Repeated Motions  Baseline sx: 3/10  Flexion: x10 standing   Sx afterward: " Same    Baseline sx: 3/10  Extension: x10 Prone pressups  Sx afterward: Same    Hip ROM (L/R)  Flexion: 125°/125°  Abduction: 45°/45°  Extension: 10°/10°  External Rotation: 45°/45° - typically painful but not today.   Internal rotation: 25°/25°     Specific Lower Extremity MMT (L/R)  Gluteus Jona (prone): 4-/5, 4-/5  Gluteus Medius: 4/5, 4/5      Special Tests (L/R):  Straight Leg Raise: Negative, Negative  Well[place] Crossed SLR: Negative, Negative  Slump Test: Negative, Negative  Femoral Dural Tension: Negative, Negative  Prone Instability: Negative, Negative  Abdominal Leg Lower Test: Loss of trunk/abdominal stability at approx 60deg, sx provoked.     Joint mobility testing (normal unless otherwise noted below)      Treatments:  Therapeutic Exercise (37966): 23 minutes  Performing the following to improve elbow flexor/extensor strength of LUE:  UBE - 5min - for dynamic warm up.   Deadbug   Pallof Press  Patient was educated on PT diagnosis, plan of care, role of therapy, activity modification, symptom monitoring, and home exercise program.   Pt educated on symptoms, anatomy, and benefits of exercises provided in HEP. Pt provided written handout and verbal education on exercises performed below. Pt instructed to stop exercises if pain occurs during performance at home.        Manual Therapy (60926):   minutes  Not performed    Neuromuscular Re-education (23392):   minutes  Not performed    Therapeutic Activitity (64932):   minutes  Not performed            HEP:  Access Code: [email or web address]  URL: [email or web address]  Date: 02/07/2025  Prepared by: Tariq Josue     Exercises  - Seated Eccentric Wrist Flexion with Dumbbell  - 1 x daily - 4-5 x weekly - 3 sets - 10 reps  - Seated Eccentric Wrist Extension  - 1 x daily - 4-5 x weekly - 3 sets - 10 reps  - Forearm Supination with Curl Bar - 1 x daily - 4-5 x weekly - 3 sets - 10 reps  - Forearm Pronation with Curl Bar - 1 x daily - 4-5 x weekly - 3  sets - 10 reps  - Standing Shoulder Horizontal Abduction with Resistance  - 1 x daily - 4-5 x weekly - 3 sets - 10 reps GREEN - BLUE as tolerated  ^  Education and discussion on HEP and treatment regarding the benefits related to current condition, POC, pathophysiology, and precautions    LOW BACK:  - Dead Bug  - 1 x daily - 5-7 x weekly - 3 sets - 20 reps  - Standing Anti-Rotation Press with Anchored Resistance  - 1 x daily - 5-7 x weekly - 3 sets - 10 reps  ^  Education and discussion on HEP and treatment regarding the benefits related to current condition, POC, pathophysiology, and precautions

## 2025-04-30 ENCOUNTER — APPOINTMENT (OUTPATIENT)
Dept: ORTHOPEDIC SURGERY | Facility: CLINIC | Age: 55
End: 2025-04-30
Payer: COMMERCIAL

## 2025-05-02 ENCOUNTER — APPOINTMENT (OUTPATIENT)
Dept: PRIMARY CARE | Facility: CLINIC | Age: 55
End: 2025-05-02
Payer: COMMERCIAL

## 2025-05-02 VITALS
SYSTOLIC BLOOD PRESSURE: 127 MMHG | BODY MASS INDEX: 29.15 KG/M2 | OXYGEN SATURATION: 95 % | HEART RATE: 63 BPM | DIASTOLIC BLOOD PRESSURE: 71 MMHG | WEIGHT: 227 LBS

## 2025-05-02 DIAGNOSIS — R73.03 PREDIABETES: ICD-10-CM

## 2025-05-02 DIAGNOSIS — Z00.00 HEALTH MAINTENANCE EXAMINATION: Primary | ICD-10-CM

## 2025-05-02 DIAGNOSIS — E55.9 MILD VITAMIN D DEFICIENCY: ICD-10-CM

## 2025-05-02 DIAGNOSIS — E29.1 HYPOGONADISM IN MALE: ICD-10-CM

## 2025-05-02 DIAGNOSIS — E78.5 DYSLIPIDEMIA: ICD-10-CM

## 2025-05-02 DIAGNOSIS — Z51.81 ENCOUNTER FOR MONITORING TESTOSTERONE REPLACEMENT THERAPY: ICD-10-CM

## 2025-05-02 DIAGNOSIS — Z79.890 ENCOUNTER FOR MONITORING TESTOSTERONE REPLACEMENT THERAPY: ICD-10-CM

## 2025-05-02 DIAGNOSIS — E03.9 HYPOTHYROIDISM, UNSPECIFIED TYPE: ICD-10-CM

## 2025-05-02 DIAGNOSIS — Z87.891 HISTORY OF NICOTINE DEPENDENCE: ICD-10-CM

## 2025-05-02 DIAGNOSIS — R79.89 ELEVATED TSH: ICD-10-CM

## 2025-05-02 DIAGNOSIS — R40.0 DAYTIME SOMNOLENCE: ICD-10-CM

## 2025-05-02 DIAGNOSIS — Z12.5 PROSTATE CANCER SCREENING: ICD-10-CM

## 2025-05-02 PROCEDURE — 99396 PREV VISIT EST AGE 40-64: CPT | Performed by: STUDENT IN AN ORGANIZED HEALTH CARE EDUCATION/TRAINING PROGRAM

## 2025-05-02 ASSESSMENT — ENCOUNTER SYMPTOMS
DEPRESSION: 0
LOSS OF SENSATION IN FEET: 0
OCCASIONAL FEELINGS OF UNSTEADINESS: 0

## 2025-05-02 ASSESSMENT — PAIN SCALES - GENERAL: PAINLEVEL_OUTOF10: 4

## 2025-05-02 ASSESSMENT — PATIENT HEALTH QUESTIONNAIRE - PHQ9: 1. LITTLE INTEREST OR PLEASURE IN DOING THINGS: NOT AT ALL
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
## 2025-05-07 ENCOUNTER — APPOINTMENT (OUTPATIENT)
Dept: PHYSICAL THERAPY | Facility: CLINIC | Age: 55
End: 2025-05-07
Payer: COMMERCIAL

## 2025-05-07 DIAGNOSIS — G89.29 CHRONIC RIGHT-SIDED LOW BACK PAIN WITHOUT SCIATICA: Primary | ICD-10-CM

## 2025-05-07 DIAGNOSIS — M54.50 CHRONIC RIGHT-SIDED LOW BACK PAIN WITHOUT SCIATICA: Primary | ICD-10-CM

## 2025-05-07 PROCEDURE — 97110 THERAPEUTIC EXERCISES: CPT | Mod: GP

## 2025-05-07 NOTE — PROGRESS NOTES
Physical Therapy Treatment      Patient Name: Eliseo Patricio  MRN: [id number]  Today's Date: 5/7/2025  Visit #: 8  Insurance:  0 COPAY, 10% COINS, 200 DED (MET), 1200 OOP MAX, 40 VS, NO AUTH REQ   Time Calculation  Start Time: 1615  Stop Time: 1656  Time Calculation (min): 41 min    1. Chronic right-sided low back pain without sciatica                ASSESSMENT:  Pt presenting w/LLE distal radicular sx. Able to reconfirm/rule out potential lumbar spine involvement in LLE paresthesia. Seems to be d/t more distal entrapment potentially at fibular head, benefiting from  sciatic/peroneal n dural tensioner. Pt tolerating nerve tensioner and remaining strengthening this session well. Discussed signs/sx of hernia d/t nature of R hip sx along inguinal canal, verbalized understanding. Pt to cont to benefit from skilled PT to improve CLOF.         GOALS:  Elbow:   Patient will improve quickDASH score to meet minimal detectable change of improvement to improve performance of ADLs. (9pts) not tested but likely met     Patient will be independent with home exercise program for proper self-management of condition.  met     Patient will improve pain free active range of motion in deficit areas for ADL completion. met     Patient will improve strength in deficit areas so patient can perform ADLs with less pain. met     Patient will subjectively report the ability to get through an entire work shift  w/familiar sx </= 1/10 while performing and no significant flare up 24hrs following activity. met     Pt will subjectively report the ability to perform gym activities jacqui related to pulling w/familiar sx </= 1/10 while performing and no significant flare up 24hrs following activity.  met      Low Back:  Patient will be independent with home exercise program for proper self-management of condition.      Patient will improve pain free active range of motion in deficit areas for ADL completion.      Patient will improve strength in deficit  "areas so patient can perform ADLs with less pain.     Shoulder strength, endurance and motor control will improve while performing activities, such as, lifting, carrying and putting down 20lbs for occupational ADLs.     Patient will subjectively report the ability to get through an entire work shift  w/familiar sx </= 1/10 while performing and no significant flare up 24hrs following activity.      Pt will subjectively report the ability to perform gym activities jacqui related to lifting weights w/familiar sx </= 1/10 while performing and no significant flare up 24hrs following activity.       PLAN:  1x/week for 6 weeks.       SUBJECTIVE:  Pt reports he has had some LBP \"here and there\" but nothing too bad. He has been getting some R hip burning in wrap around distribution. He does think the foot numbness is d/t the Low back.        OBJECTIVE:  Treatments:  Therapeutic Exercise (26767): 41 minutes  -Positive SLR LLE this date  -LLE numbness at baseline  -SLUMP negative  Prone Prop - 2min  Prone Pressups - x10  No change in LLE paresthesia sx, nor LBP  -Positive SLR LLE this date  -LLE numbness at baseline   Seated Lumbar Flexion - x10  No change  Supine Sciatic Nerve Glide for distal peroneal n. Radicular sx (ankle inversion). - x8   Pallof Press w/cable machine - 2x10 ea direction, 7.5lbs  High to Low w/cable - 2x10 ea direction, 7.5lbs  Discussed monitoring anterior hip pain for potential development of bulge that may indicate hernia d/t pain distribution being along inguinal canal. Otherwise will cont w/trunk/hip strengthening to improve familiar sx.         Manual Therapy (60282):   minutes  Not performed    Neuromuscular Re-education (85256):   minutes  Not performed    Therapeutic Activitity (83358):   minutes  Not performed            HEP:  Access Code: [email or web address]  URL: [email or web address]  Date: 02/07/2025  Prepared by: Tariq Josue     Exercises  - Seated Eccentric Wrist Flexion with " Dumbbell  - 1 x daily - 4-5 x weekly - 3 sets - 10 reps  - Seated Eccentric Wrist Extension  - 1 x daily - 4-5 x weekly - 3 sets - 10 reps  - Forearm Supination with Curl Bar - 1 x daily - 4-5 x weekly - 3 sets - 10 reps  - Forearm Pronation with Curl Bar - 1 x daily - 4-5 x weekly - 3 sets - 10 reps  - Standing Shoulder Horizontal Abduction with Resistance  - 1 x daily - 4-5 x weekly - 3 sets - 10 reps GREEN - BLUE as tolerated  ^  Education and discussion on HEP and treatment regarding the benefits related to current condition, POC, pathophysiology, and precautions    LOW BACK:  - Dead Bug  - 1 x daily - 5-7 x weekly - 3 sets - 20 reps  - Standing Anti-Rotation Press with Anchored Resistance  - 1 x daily - 5-7 x weekly - 3 sets - 10 reps  - Supine 90/90 Sciatic Nerve Glide with Knee Flexion/Extension  - 1 x daily - 5-7 x weekly - 1 sets - 8 reps  ^  Education and discussion on HEP and treatment regarding the benefits related to current condition, POC, pathophysiology, and precautions

## 2025-05-08 DIAGNOSIS — E03.9 HYPOTHYROIDISM, UNSPECIFIED TYPE: Primary | ICD-10-CM

## 2025-05-08 RX ORDER — LEVOTHYROXINE SODIUM 175 UG/1
175 TABLET ORAL DAILY
Qty: 90 TABLET | Refills: 1 | Status: SHIPPED | OUTPATIENT
Start: 2025-05-08 | End: 2025-11-04

## 2025-05-11 LAB
25(OH)D3+25(OH)D2 SERPL-MCNC: 49 NG/ML (ref 30–100)
ALBUMIN SERPL-MCNC: 4.5 G/DL (ref 3.6–5.1)
ALBUMIN/CREAT UR: NORMAL MG/G CREAT
ALP SERPL-CCNC: 60 U/L (ref 35–144)
ALT SERPL-CCNC: 31 U/L (ref 9–46)
ANION GAP SERPL CALCULATED.4IONS-SCNC: 9 MMOL/L (CALC) (ref 7–17)
AST SERPL-CCNC: 30 U/L (ref 10–35)
BASOPHILS # BLD AUTO: 102 CELLS/UL (ref 0–200)
BASOPHILS NFR BLD AUTO: 1.4 %
BILIRUB SERPL-MCNC: 0.6 MG/DL (ref 0.2–1.2)
BUN SERPL-MCNC: 31 MG/DL (ref 7–25)
CALCIUM SERPL-MCNC: 9.3 MG/DL (ref 8.6–10.3)
CHLORIDE SERPL-SCNC: 103 MMOL/L (ref 98–110)
CHOLEST SERPL-MCNC: 120 MG/DL
CHOLEST/HDLC SERPL: 2.9 (CALC)
CO2 SERPL-SCNC: 29 MMOL/L (ref 20–32)
CREAT SERPL-MCNC: 1.11 MG/DL (ref 0.7–1.3)
CREAT UR-MCNC: 32 MG/DL (ref 20–320)
EGFRCR SERPLBLD CKD-EPI 2021: 78 ML/MIN/1.73M2
EOSINOPHIL # BLD AUTO: 380 CELLS/UL (ref 15–500)
EOSINOPHIL NFR BLD AUTO: 5.2 %
ERYTHROCYTE [DISTWIDTH] IN BLOOD BY AUTOMATED COUNT: 12.5 % (ref 11–15)
EST. AVERAGE GLUCOSE BLD GHB EST-MCNC: 120 MG/DL
EST. AVERAGE GLUCOSE BLD GHB EST-SCNC: 6.6 MMOL/L
GLUCOSE SERPL-MCNC: 100 MG/DL (ref 65–99)
HBA1C MFR BLD: 5.8 %
HCT VFR BLD AUTO: 49 % (ref 38.5–50)
HDLC SERPL-MCNC: 42 MG/DL
HGB BLD-MCNC: 16.5 G/DL (ref 13.2–17.1)
LDLC SERPL CALC-MCNC: 62 MG/DL (CALC)
LYMPHOCYTES # BLD AUTO: 2088 CELLS/UL (ref 850–3900)
LYMPHOCYTES NFR BLD AUTO: 28.6 %
MCH RBC QN AUTO: 32.2 PG (ref 27–33)
MCHC RBC AUTO-ENTMCNC: 33.7 G/DL (ref 32–36)
MCV RBC AUTO: 95.5 FL (ref 80–100)
MICROALBUMIN UR-MCNC: <0.2 MG/DL
MONOCYTES # BLD AUTO: 832 CELLS/UL (ref 200–950)
MONOCYTES NFR BLD AUTO: 11.4 %
NEUTROPHILS # BLD AUTO: 3898 CELLS/UL (ref 1500–7800)
NEUTROPHILS NFR BLD AUTO: 53.4 %
NONHDLC SERPL-MCNC: 78 MG/DL (CALC)
PLATELET # BLD AUTO: 251 THOUSAND/UL (ref 140–400)
PMV BLD REES-ECKER: 9.4 FL (ref 7.5–12.5)
POTASSIUM SERPL-SCNC: 4.8 MMOL/L (ref 3.5–5.3)
PROT SERPL-MCNC: 6.7 G/DL (ref 6.1–8.1)
PSA FREE MFR SERPL: 43 % (CALC)
PSA FREE SERPL-MCNC: 0.3 NG/ML
PSA SERPL-MCNC: 0.7 NG/ML
RBC # BLD AUTO: 5.13 MILLION/UL (ref 4.2–5.8)
SODIUM SERPL-SCNC: 141 MMOL/L (ref 135–146)
T4 FREE SERPL-MCNC: 1.2 NG/DL (ref 0.8–1.8)
TESTOST FREE SERPL-MCNC: 99 PG/ML (ref 35–155)
TESTOST SERPL-MCNC: 490 NG/DL (ref 250–1100)
TRIGL SERPL-MCNC: 80 MG/DL
TSH SERPL-ACNC: 6.14 MIU/L (ref 0.4–4.5)
WBC # BLD AUTO: 7.3 THOUSAND/UL (ref 3.8–10.8)

## 2025-05-12 ENCOUNTER — APPOINTMENT (OUTPATIENT)
Dept: DERMATOLOGY | Facility: HOSPITAL | Age: 55
End: 2025-05-12
Payer: COMMERCIAL

## 2025-05-12 DIAGNOSIS — R21 RASH AND OTHER NONSPECIFIC SKIN ERUPTION: Primary | ICD-10-CM

## 2025-05-12 PROCEDURE — 99204 OFFICE O/P NEW MOD 45 MIN: CPT | Performed by: DERMATOLOGY

## 2025-05-12 PROCEDURE — 99214 OFFICE O/P EST MOD 30 MIN: CPT | Mod: GC | Performed by: DERMATOLOGY

## 2025-05-12 RX ORDER — TRIAMCINOLONE ACETONIDE 1 MG/G
OINTMENT TOPICAL
Qty: 80 G | Refills: 3 | Status: SHIPPED | OUTPATIENT
Start: 2025-05-12

## 2025-05-12 RX ORDER — CLOBETASOL PROPIONATE 0.5 MG/G
OINTMENT TOPICAL
Qty: 60 G | Refills: 3 | Status: SHIPPED | OUTPATIENT
Start: 2025-05-12

## 2025-05-12 ASSESSMENT — DERMATOLOGY PATIENT ASSESSMENT
ARE YOU AN ORGAN TRANSPLANT RECIPIENT: NO
HAVE YOU HAD OR DO YOU HAVE A STAPH INFECTION: YES
DO YOU USE A TANNING BED: NO
HAVE YOU HAD OR DO YOU HAVE VASCULAR DISEASE: NO
DO YOU HAVE ANY NEW OR CHANGING LESIONS: YES

## 2025-05-12 ASSESSMENT — DERMATOLOGY QUALITY OF LIFE (QOL) ASSESSMENT
WHAT SINGLE SKIN CONDITION LISTED BELOW IS THE PATIENT ANSWERING THE QUALITY-OF-LIFE ASSESSMENT QUESTIONS ABOUT: NONE OF THE ABOVE
DATE THE QUALITY-OF-LIFE ASSESSMENT WAS COMPLETED: 67337
RATE HOW BOTHERED YOU ARE BY SYMPTOMS OF YOUR SKIN PROBLEM (EG, ITCHING, STINGING BURNING, HURTING OR SKIN IRRITATION): 1
RATE HOW EMOTIONALLY BOTHERED YOU ARE BY YOUR SKIN PROBLEM (FOR EXAMPLE, WORRY, EMBARRASSMENT, FRUSTRATION): 1
RATE HOW BOTHERED YOU ARE BY EFFECTS OF YOUR SKIN PROBLEMS ON YOUR ACTIVITIES (EG, GOING OUT, ACCOMPLISHING WHAT YOU WANT, WORK ACTIVITIES OR YOUR RELATIONSHIPS WITH OTHERS): 1
ARE THERE EXCLUSIONS OR EXCEPTIONS FOR THE QUALITY OF LIFE ASSESSMENT: NO

## 2025-05-12 ASSESSMENT — ITCH NUMERIC RATING SCALE: HOW SEVERE IS YOUR ITCHING?: 0

## 2025-05-12 ASSESSMENT — PATIENT GLOBAL ASSESSMENT (PGA): PATIENT GLOBAL ASSESSMENT: PATIENT GLOBAL ASSESSMENT:  1 - CLEAR

## 2025-05-12 NOTE — Clinical Note
Well demarcated erythematous plaque with scales  and fissures on both palms, scattered round and oval pink scaly thin plaques on the cubital fossa and axilla. No involvement of the periumbilical/umbilical area, lower back, and scalp. No nail changes.

## 2025-05-12 NOTE — PROGRESS NOTES
Subjective     Eliseo Patricio is a 54 y.o. male who presents for the following: Rash (Both hands, since fall and on left upper arm).     Rash initially on the feet starting late last summer. Patient thought it was secondary to his antihistamines. He uses lotions and soaks for the feet and also stopped his antihistamines which resolved the rash on his feet. However, starting the fall, he noted involvement of his hands. He reports cracking, pain, and tightness with mild pruritus. He tried Aquaphor and lotions with no major improvement. Rash then progressed his arms and axilla. No medication changes besides starting testosterone.     Of note, he did have bursitis infection in 2/2025 and was prescribed a course of bactrim in the ED.     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     Review of Systems:  No other skin or systemic complaints other than what is documented elsewhere in the note.    The following portions of the chart were reviewed this encounter and updated as appropriate:          Skin Cancer History  Biopsy Log Book  No skin cancers from Specimen Tracking.    Additional History      Specialty Problems    None       Objective   Well appearing patient in no apparent distress; mood and affect are within normal limits.    A focused skin examination was performed. All findings within normal limits unless otherwise noted below.    Left Antecubital Fossa, Left Axilla, Left Hand - Anterior, Right Antecubital Fossa, Right Axilla, Right Hand - Anterior  Well demarcated erythematous plaque with scales  and fissures on both palms, scattered round and oval pink scaly thin plaques on the cubital fossa and axilla. No involvement of the periumbilical/umbilical area, lower back, and scalp. No nail changes.        Assessment/Plan   RASH AND OTHER NONSPECIFIC SKIN ERUPTION  Left Antecubital Fossa, Left Axilla, Left Hand -  Anterior, Right Antecubital Fossa, Right Axilla, Right Hand - Anterior  Favoring an eczema/dyshidrotic eczema vs psoriasis   - Well demarcated erythematous plaques with fissures on the palms can be consistent with both eczema/dyshidrotic eczema and psoriasis. Thin plaques with scales involving the cubital fossa is a more classic location for eczema, though can also be seen in psoriasis. The patient also has a known family history of eczema and asthma. 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   -At this time, we are unable to delineate between the two conditions but first line therapy for both conditions are similar.  Recommend starting clobetasol 0.05% ointment BID to the hands up to 4 weeks and 3x/week until follow up   - Recommend triamicinolone 0.1% ointment BID to the arms up to 2 weeks. Take a 1 week break and repeat as needed  Related Procedures  Follow Up In Dermatology - Established Patient  Related Medications  clobetasol (Temovate) 0.05 % ointment  Use twice a day, daily, to the hands up to 4 weeks and then decrease to twice a day, 3 times a week  triamcinolone (Kenalog) 0.1 % ointment  Use twice a day to affected area on the arms up to 2 weeks. Then take a 1 week break. Repeat as needed.     Follow up in 6-8 weeks for his rash and FBSE     My Kristen, DO, PGY-4  Department of Dermatology    I saw and evaluated the patient. I personally obtained the key and critical portions of the history and physical exam or was physically present for key and critical portions performed by the resident/fellow. I reviewed the resident/fellow's documentation and discussed the patient with the resident/fellow. I agree with the resident/fellow's medical decision making as documented in the note and made changes where appropriate.    Christi Rahman MD

## 2025-05-12 NOTE — Clinical Note
Favoring an eczema/dyshidrotic eczema vs psoriasis   - Well demarcated erythematous plaques with fissures on the palms can be consistent with both eczema/dyshidrotic eczema and psoriasis. Thin plaques with scales involving the cubital fossa is a more classic location for eczema, though can also be seen in psoriasis. The patient also has a known family history of eczema and asthma. 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   -At this time, we are unable to delineate between the two conditions but first line therapy for both conditions are similar.  Recommend starting clobetasol 0.05% ointment BID to the hands up to 4 weeks and 3x/week until follow up   - Recommend triamicinolone 0.1% ointment BID to the arms up to 2 weeks. Take a 1 week break and repeat as needed

## 2025-05-14 ENCOUNTER — APPOINTMENT (OUTPATIENT)
Dept: PHYSICAL THERAPY | Facility: CLINIC | Age: 55
End: 2025-05-14
Payer: COMMERCIAL

## 2025-05-21 ENCOUNTER — TREATMENT (OUTPATIENT)
Dept: PHYSICAL THERAPY | Facility: CLINIC | Age: 55
End: 2025-05-21
Payer: COMMERCIAL

## 2025-05-21 DIAGNOSIS — G89.29 CHRONIC RIGHT-SIDED LOW BACK PAIN WITHOUT SCIATICA: Primary | ICD-10-CM

## 2025-05-21 DIAGNOSIS — M54.50 CHRONIC RIGHT-SIDED LOW BACK PAIN WITHOUT SCIATICA: Primary | ICD-10-CM

## 2025-05-21 PROCEDURE — 97110 THERAPEUTIC EXERCISES: CPT | Mod: GP

## 2025-05-21 NOTE — PROGRESS NOTES
"Physical Therapy Treatment      Patient Name: Eliseo Patricio  MRN: [id number]  Today's Date: 5/21/2025  Visit #: 9  Insurance:  0 COPAY, 10% COINS, 200 DED (MET), 1200 OOP MAX, 40 VS, NO AUTH REQ   Time Calculation  Start Time: 1632  Stop Time: 1658  Time Calculation (min): 26 min    1. Chronic right-sided low back pain without sciatica                  ASSESSMENT:  Pt's LLE radicular sx are making small improvements. He is having familiar sx of \"sharp/electric\" pain while performing dural tensioner at end range, this resolves when pt stops activity. Recommending he cont's it d/t improving foot numbness overall and normalcy of some amount of n. sx during tensioner activities. Trunk/core strengthening is improving and having good carryover to familiar LBP. Pt will cont to benefit from skilled PT to improve remaining LLE sx to improve CLOF.         GOALS:  Elbow:   Patient will improve quickDASH score to meet minimal detectable change of improvement to improve performance of ADLs. (9pts) not tested but likely met     Patient will be independent with home exercise program for proper self-management of condition.  met     Patient will improve pain free active range of motion in deficit areas for ADL completion. met     Patient will improve strength in deficit areas so patient can perform ADLs with less pain. met     Patient will subjectively report the ability to get through an entire work shift  w/familiar sx </= 1/10 while performing and no significant flare up 24hrs following activity. met     Pt will subjectively report the ability to perform gym activities jacqui related to pulling w/familiar sx </= 1/10 while performing and no significant flare up 24hrs following activity.  met      Low Back:  Patient will be independent with home exercise program for proper self-management of condition.      Patient will improve pain free active range of motion in deficit areas for ADL completion.      Patient will improve strength in " "deficit areas so patient can perform ADLs with less pain.     Shoulder strength, endurance and motor control will improve while performing activities, such as, lifting, carrying and putting down 20lbs for occupational ADLs.     Patient will subjectively report the ability to get through an entire work shift  w/familiar sx </= 1/10 while performing and no significant flare up 24hrs following activity.      Pt will subjectively report the ability to perform gym activities jacqui related to lifting weights w/familiar sx </= 1/10 while performing and no significant flare up 24hrs following activity.       PLAN:  1x/week for 6 weeks.       SUBJECTIVE:  Pt reports the numbness in the foot is a little better but he is now getting some shooting pain down the leg when he does the nerve glide at end range.     **Pt needs to leave at 5p\"\"       OBJECTIVE:  Treatments:  Therapeutic Exercise (46110): 26 minutes  -Positive SLR LLE this date  -LLE sharp/shooting pain at fibular head w/SLR w/overpressure.   Supine Sciatic Nerve Glide for distal peroneal n. Radicular sx (ankle inversion). - x8   Discussed in depth purpose of nerve glide and normalcy of some amount of dural sx to be provoked but that over time this should improve jacqui since paresthesia is improving. Discussed to hold if sx become significantly worse.   Deadbug LE only - 3x20 (10ea LE)  Fwd High Plank w/marching on elevated mat table - 3x20 (10ea LE)          Manual Therapy (68053):   minutes  Not performed    Neuromuscular Re-education (91607):   minutes  Not performed    Therapeutic Activitity (21291):   minutes  Not performed            HEP:  Access Code: [email or web address]  URL: [email or web address]  Date: 02/07/2025  Prepared by: Tariq Josue     Exercises  - Seated Eccentric Wrist Flexion with Dumbbell  - 1 x daily - 4-5 x weekly - 3 sets - 10 reps  - Seated Eccentric Wrist Extension  - 1 x daily - 4-5 x weekly - 3 sets - 10 reps  - Forearm Supination " with Curl Bar - 1 x daily - 4-5 x weekly - 3 sets - 10 reps  - Forearm Pronation with Curl Bar - 1 x daily - 4-5 x weekly - 3 sets - 10 reps  - Standing Shoulder Horizontal Abduction with Resistance  - 1 x daily - 4-5 x weekly - 3 sets - 10 reps GREEN - BLUE as tolerated  ^  Education and discussion on HEP and treatment regarding the benefits related to current condition, POC, pathophysiology, and precautions    LOW BACK:  - Dead Bug  - 1 x daily - 5-7 x weekly - 3 sets - 20 reps  - Standing Anti-Rotation Press with Anchored Resistance  - 1 x daily - 5-7 x weekly - 3 sets - 10 reps  - Supine 90/90 Sciatic Nerve Glide with Knee Flexion/Extension  - 1 x daily - 5-7 x weekly - 1 sets - 8 reps  ^  Education and discussion on HEP and treatment regarding the benefits related to current condition, POC, pathophysiology, and precautions

## 2025-05-27 DIAGNOSIS — E29.1 HYPOGONADISM IN MALE: ICD-10-CM

## 2025-05-28 ENCOUNTER — HOSPITAL ENCOUNTER (OUTPATIENT)
Dept: RADIOLOGY | Facility: CLINIC | Age: 55
Discharge: HOME | End: 2025-05-28
Payer: COMMERCIAL

## 2025-05-28 DIAGNOSIS — S83.90XS SPRAIN OF KNEE, SEQUELA: ICD-10-CM

## 2025-05-28 PROCEDURE — 73721 MRI JNT OF LWR EXTRE W/O DYE: CPT | Mod: RIGHT SIDE | Performed by: STUDENT IN AN ORGANIZED HEALTH CARE EDUCATION/TRAINING PROGRAM

## 2025-05-28 PROCEDURE — 73721 MRI JNT OF LWR EXTRE W/O DYE: CPT | Mod: RT

## 2025-05-28 PROCEDURE — 73721 MRI JNT OF LWR EXTRE W/O DYE: CPT | Mod: LT

## 2025-05-29 PROBLEM — M35.9 UNDIFFERENTIATED CONNECTIVE TISSUE DISEASE (MULTI): Status: RESOLVED | Noted: 2024-05-20 | Resolved: 2025-05-29

## 2025-05-29 PROBLEM — M35.9 AUTOIMMUNE DISEASE (MULTI): Status: RESOLVED | Noted: 2024-05-20 | Resolved: 2025-05-29

## 2025-05-29 PROBLEM — J45.909 ASTHMA: Status: RESOLVED | Noted: 2024-03-29 | Resolved: 2025-05-29

## 2025-05-29 PROBLEM — E11.9 TYPE 2 DIABETES MELLITUS: Status: RESOLVED | Noted: 2024-03-29 | Resolved: 2025-05-29

## 2025-06-02 RX ORDER — TESTOSTERONE CYPIONATE 200 MG/ML
200 INJECTION, SOLUTION INTRAMUSCULAR
Qty: 3 ML | Refills: 0 | Status: SHIPPED | OUTPATIENT
Start: 2025-06-02 | End: 2025-10-20

## 2025-06-06 NOTE — PROGRESS NOTES
History: Eliseo is here for both knees.  He has known arthritic change and has been seeing Dr. Griffin.  He has tried cortisone, viscosupplementation and PRP injections.  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  His last injection was 4 months ago which was PRP.  He did not get as much relief this time around.  We obtained MRIs and he is here to go over results.    Past medical history: Multiple  Medications: Multiple  Allergies: No known drug allergies    Please refer to the intake H&P regarding the patient's review of systems, family history and social history as was done today    HEENT: Normal  Lungs: Clear to auscultation  Heart: RRR  Abdomen: Soft, nontender  Skin: clear  Extremity: He has full range of motion of the knees.  He has pain around the patella with ballottement.  There is 1+ crepitus range of motion.  Negative Lachman and posterior drawer.  No significant pain medially or laterally.  No numbness or tingling.  Contralateral exam is normal for strength, motion, stability and neurovascular assessment.    Radiographs: Bilateral knee x-rays show moderate patellofemoral joint space narrowing.  There is fairly mild medial joint space narrowing with neutral alignment.  Bilateral knee MRIs show intact menisci and ligamentous structures.  There are areas of full-thickness chondral loss to the patellofemoral joint with fairly mild medial and lateral wear.    Assessment: Severe bilateral patellofemoral DJD    Plan: He has tried several injections over the years.  He would like to pursue viscosupplementation.  He would also like to get into some formal therapy to focus on thigh strengthening exercises that do not involve squatting or lunges.  He is inquiring about surgical options and we did discuss patellofemoral unicompartmental arthroplasty versus total knee arthroplasty.  He would consider this further if not improving.  We will see him  back for the injections upon approval.  All questions were answered today with the patient.    For complete plan and/or surgical details, please refer to Dr. San's portion of this split/shared dictation.     Xi Gaming PA-C    In a face-to-face encounter today, I Des San MD performed a history and physical examination, discussed pertinent diagnostic studies if indicated, and discussed diagnosis and management strategies with both the patient and the midlevel provider.  I reviewed the midlevel's note and agree with the documented findings and plan of care.  Greater than 50% of the evaluation and treatment decision was performed by the physician myself during today's visit.    Eliseo has fairly significant patellofemoral wear.  Fortunately there is no evidence of other internal derangement on MRI.  At this point he would like to pursue viscosupplementation.  He would also like to try some therapy exercises for quad strengthening but understands the need to do this in a way that he avoids irritating the knee.  We also discussed options in the future including potential patellofemoral arthroplasty if not improving.  We will see him back for his injections.  He was also given a prescription for Mobic to see if it helps with his inflammation and pain.        This note was generated with voice recognition software and may contain grammatical errors.

## 2025-06-09 ENCOUNTER — OFFICE VISIT (OUTPATIENT)
Dept: ORTHOPEDIC SURGERY | Facility: CLINIC | Age: 55
End: 2025-06-09
Payer: COMMERCIAL

## 2025-06-09 ENCOUNTER — APPOINTMENT (OUTPATIENT)
Facility: CLINIC | Age: 55
End: 2025-06-09
Payer: COMMERCIAL

## 2025-06-09 VITALS
RESPIRATION RATE: 18 BRPM | OXYGEN SATURATION: 96 % | SYSTOLIC BLOOD PRESSURE: 116 MMHG | BODY MASS INDEX: 29.52 KG/M2 | HEIGHT: 74 IN | WEIGHT: 230 LBS | HEART RATE: 69 BPM | DIASTOLIC BLOOD PRESSURE: 70 MMHG | TEMPERATURE: 99.8 F

## 2025-06-09 DIAGNOSIS — G47.33 OBSTRUCTIVE SLEEP APNEA: Primary | ICD-10-CM

## 2025-06-09 DIAGNOSIS — M17.0 OSTEOARTHRITIS OF BOTH KNEES, UNSPECIFIED OSTEOARTHRITIS TYPE: Primary | ICD-10-CM

## 2025-06-09 PROCEDURE — 1036F TOBACCO NON-USER: CPT | Performed by: GENERAL PRACTICE

## 2025-06-09 PROCEDURE — 3008F BODY MASS INDEX DOCD: CPT | Performed by: GENERAL PRACTICE

## 2025-06-09 PROCEDURE — 99214 OFFICE O/P EST MOD 30 MIN: CPT | Performed by: ORTHOPAEDIC SURGERY

## 2025-06-09 PROCEDURE — 99214 OFFICE O/P EST MOD 30 MIN: CPT | Performed by: GENERAL PRACTICE

## 2025-06-09 PROCEDURE — 99212 OFFICE O/P EST SF 10 MIN: CPT | Performed by: ORTHOPAEDIC SURGERY

## 2025-06-09 PROCEDURE — 1036F TOBACCO NON-USER: CPT | Performed by: ORTHOPAEDIC SURGERY

## 2025-06-09 RX ORDER — MELOXICAM 15 MG/1
15 TABLET ORAL DAILY
Qty: 30 TABLET | Refills: 2 | Status: SHIPPED | OUTPATIENT
Start: 2025-06-09 | End: 2025-09-07

## 2025-06-09 ASSESSMENT — SLEEP AND FATIGUE QUESTIONNAIRES
HOW LIKELY ARE YOU TO NOD OFF OR FALL ASLEEP WHILE SITTING QUIETLY AFTER LUNCH WITHOUT ALCOHOL: WOULD NEVER DOZE
HOW LIKELY ARE YOU TO NOD OFF OR FALL ASLEEP IN A CAR, WHILE STOPPED FOR A FEW MINUTES IN TRAFFIC: WOULD NEVER DOZE
DIFFICULTY_FALLING_ASLEEP: MODERATE
HOW LIKELY ARE YOU TO NOD OFF OR FALL ASLEEP WHILE SITTING AND TALKING TO SOMEONE: WOULD NEVER DOZE
SLEEP_PROBLEM_NOTICEABLE_TO_OTHERS: MUCH
HOW LIKELY ARE YOU TO NOD OFF OR FALL ASLEEP WHILE WATCHING TV: SLIGHT CHANCE OF DOZING
SLEEP_PROBLEM_INTERFERES_DAILY_ACTIVITIES: MUCH
HOW LIKELY ARE YOU TO NOD OFF OR FALL ASLEEP WHILE SITTING INACTIVE IN A PUBLIC PLACE: WOULD NEVER DOZE
HOW LIKELY ARE YOU TO NOD OFF OR FALL ASLEEP WHEN YOU ARE A PASSENGER IN A CAR FOR AN HOUR WITHOUT A BREAK: WOULD NEVER DOZE
HOW LIKELY ARE YOU TO NOD OFF OR FALL ASLEEP WHILE LYING DOWN TO REST IN THE AFTERNOON WHEN CIRCUMSTANCES PERMIT: WOULD NEVER DOZE
WAKING_TOO_EARLY: MILD
HOW LIKELY ARE YOU TO NOD OFF OR FALL ASLEEP WHILE SITTING AND READING: WOULD NEVER DOZE
WORRIED_DISTRESSED_DUE_TO_SLEEP: MUCH
ESS TOTAL SCORE: 1
SATISFACTION_WITH_CURRENT_SLEEP_PATTERN: DISSATISFIED
DIFFICULTY_STAYING_ASLEEP: SEVERE

## 2025-06-09 ASSESSMENT — COLUMBIA-SUICIDE SEVERITY RATING SCALE - C-SSRS
2. HAVE YOU ACTUALLY HAD ANY THOUGHTS OF KILLING YOURSELF?: NO
6. HAVE YOU EVER DONE ANYTHING, STARTED TO DO ANYTHING, OR PREPARED TO DO ANYTHING TO END YOUR LIFE?: NO
1. IN THE PAST MONTH, HAVE YOU WISHED YOU WERE DEAD OR WISHED YOU COULD GO TO SLEEP AND NOT WAKE UP?: NO

## 2025-06-09 ASSESSMENT — PATIENT HEALTH QUESTIONNAIRE - PHQ9
SUM OF ALL RESPONSES TO PHQ9 QUESTIONS 1 AND 2: 0
1. LITTLE INTEREST OR PLEASURE IN DOING THINGS: NOT AT ALL
2. FEELING DOWN, DEPRESSED OR HOPELESS: NOT AT ALL

## 2025-06-09 NOTE — PROGRESS NOTES
Patient: Eliseo Patricio    [id number]  : 1970 -- AGE 55 y.o.    Provider: Omid Verduzco DO     Location [facility]   Service Date: 2025              [facility]  New Visit Note        HISTORY OF PRESENT ILLNESS     The patient's referring provider is: Faizan Purvis MD    HISTORY OF PRESENT ILLNESS   Eliseo Patricio is a 55 y.o. male who presents to a [facility] for a sleep medicine evaluation with concerns of Sleep Apnea, Excessive Daytime Sleepiness, Review Sleep Study Results, and Unrefreshing Sleep.     PAST SLEEP HISTORY    Pmhx includes thyroid disease, low testosterone level, former smoker.     Patient had a sleep study done in 2025 due to snoring and being on testosterone. He was found to have sleep apnea and was started on pap therapy. He used it for a few months regularly but started to have congestion in his nose around may 2025 and stopped using it.     CURRENT HISTORY    On today's visit, 2025, the patient reports that he is establishing care for his sleep apnea.     When he used pap therapy:   Pap benefit: more refreshing sleep, decreased/ eliminated: snoring/ daytime sleepiness. Improved focus, more energy during the day.     Pap issues: admits to chronic dry mouth, denies skin irritation, denies perceived mask air leak.     Sleep schedule  on weekdays / work days:  Usual Bedtime: 11pm  Sleep latency:  varies   Wake time : 4:30am  Total sleep time average/day: 5 hours/day  Awakenings: 1x per night, nocturia, short.   Naps: rarely     Sleep schedule  on weekends/non work days :  Usual Bedtime:  12: 30am   Wake time : 7am    Sleep aids: n  Stimulants: n    Occupation: construction work.     Preferred sleeping position: SLEEP POSITION: sidelying    Sleep-related ROS:    Snoring:  not with pap therapy.   Witnessed apneas:   n     Gasping/ choking: n   Am Dry mouth:  y          "  Nasal congestion: y        am headaches: no    Sleep is described as unrefreshing.     Daytime sleepiness: n  Fatigue or decreased energy: n    Drowsy driving: n  Hx of car accident: n  Near-miss Car accident: n      RLS screen:  RLSSCREEN: - Sensations: Patient does not have unusual sensations in their extremities that cause an urge to move them     Sleep-related behaviors: DENIES    ESS:1       REVIEW OF SYSTEMS     REVIEW OF SYSTEMS  Review of Systems   All other systems reviewed and are negative.      ALLERGIES AND MEDICATIONS     ALLERGIES  Allergies[1]    MEDICATIONS  Current Medications[2]      PAST HISTORY     PAST MEDICAL HISTORY  He  has no past medical history on file.       PAST SURGICAL HISTORY:  Surgical History[3]    FAMILY HISTORY  Family History[4]  DOES/DOES NOT EC: does have a family history of sleep disorder.      SOCIAL HISTORY  He  reports that he has quit smoking. His smoking use included cigarettes. He has never been exposed to tobacco smoke. He has never used smokeless tobacco. He reports that he does not currently use alcohol. He reports that he does not currently use drugs.     Caffeine consumption: 4 cups coffee in am.  Alcohol consumption: rarely   Smoking: n  Marijuana: n  Other drugs: n      PHYSICAL EXAM     VITAL SIGNS: /70   Pulse 69   Temp 37.7 °C (99.8 °F)   Resp 18   Ht 1.88 m (6' 2\")   Wt 104 kg (230 lb)   SpO2 96%   BMI 29.53 kg/m²      PREVIOUS WEIGHTS:  Wt Readings from Last 3 Encounters:   06/09/25 104 kg (230 lb)   05/02/25 103 kg (227 lb)   04/08/25 103 kg (226 lb 12.8 oz)       Physical Exam  Constitutional: Alert and oriented, cooperative, no obvious distress.   HENT: normocephalic.   Eyes: PERRLA, nonicteric   Neck: Supple, trachea midline   respiratory: CTA bilaterally, no wheezing/ crackles/ cough  Cardiac: no rub/ gallops  GI:BS in all 4 quadrants, Soft, nontender, no masses  musculoskeletal/ Extremities: No clubbing  integumentary: no significant " rashes observed.   Neurologic: AOx3.   psychiatric: appropriate mood and affect.  Modified Mallampati: 4      RESULTS/DATA         ASSESSMENT/PLAN     Mr. Patricio is a 55 y.o. male and  has no past medical history on file. He was referred to the [facility] for evaluation of sleep apnea.     Problem List Items Addressed This Visit    None  Visit Diagnoses         Obstructive sleep apnea                Problem List and Orders    Pmhx includes thyroid disease, low testosterone level, former smoker.     1-DAVID  HST 2/13/2025--> severe DAVID, AHI 3% 33, AHI 4% 23.1, SpO2 hans 63.9%.     Reviewed and discussed the above sleep study results and management options in details. All questions answered, patient verbalizes understanding.     -was using autopap 5-15cwp, rAHI 3, median pressure 6.7, max avg 11, needs to use pap therapy more regularly, some days with a large leak.     -restart pap therapy.  Counseled on the importance of compliance.   -Please work with your DME to find a mask that fits you well, and controls your leak.  -provided a sample mask cushions during clinic today.     - Order supplies and mask refitting through [facility]  You can use a chin strap to help with your dry mouth, you can also adjust your humidity level to your comfort. You can use a nasal spray to help you breathe better from your nose.    He had nose bleeds with flonase sometimes; counseled on proper use. He will follow up with his ENT specialist.     -do not drive or operate heavy machinery if drowsy.  -avoid sleeping on your back.   -avoid sedating substances/ medication, alcohol, illicit drugs and tobacco.    2- Obesity  counseled on eating a healthy diet and exercising as tolerated.      3- allergic rhinitis  Follow up with ent    Follow up in 6 months or sooner as needed.              [1]   Allergies  Allergen Reactions    Penicillins Hives   [2]   Current Outpatient Medications   Medication Sig Dispense Refill     clobetasol (Temovate) 0.05 % ointment Use twice a day, daily, to the hands up to 4 weeks and then decrease to twice a day, 3 times a week 60 g 3    levothyroxine (Synthroid, Levoxyl) 175 mcg tablet Take 1 tablet (175 mcg) by mouth early in the morning.. Take on an empty stomach at the same time each day, either 30 to 60 minutes prior to breakfast 90 tablet 1    testosterone cypionate (Depo-Testosterone) 200 mg/mL injection Inject 1 mL (200 mg) into the muscle every 14 (fourteen) days. 3 mL 0    triamcinolone (Kenalog) 0.1 % ointment Use twice a day to affected area on the arms up to 2 weeks. Then take a 1 week break. Repeat as needed. 80 g 3    cyclobenzaprine (Flexeril) 10 mg tablet Take 1 tablet (10 mg) by mouth 3 times a day as needed for muscle spasms. 90 tablet 0     No current facility-administered medications for this visit.   [3]   Past Surgical History:  Procedure Laterality Date    OTHER SURGICAL HISTORY  12/12/2019    [place] tooth extraction    OTHER SURGICAL HISTORY  12/12/2019    Tonsillectomy   [4]   Family History  Problem Relation Name Age of Onset    Sleep apnea Father

## 2025-06-11 ENCOUNTER — TREATMENT (OUTPATIENT)
Dept: PHYSICAL THERAPY | Facility: CLINIC | Age: 55
End: 2025-06-11
Payer: COMMERCIAL

## 2025-06-11 DIAGNOSIS — G89.29 CHRONIC PAIN OF BOTH KNEES: Primary | ICD-10-CM

## 2025-06-11 DIAGNOSIS — M25.561 CHRONIC PAIN OF BOTH KNEES: Primary | ICD-10-CM

## 2025-06-11 DIAGNOSIS — M25.562 CHRONIC PAIN OF BOTH KNEES: Primary | ICD-10-CM

## 2025-06-11 DIAGNOSIS — G89.29 CHRONIC RIGHT-SIDED LOW BACK PAIN WITHOUT SCIATICA: ICD-10-CM

## 2025-06-11 DIAGNOSIS — M54.50 CHRONIC RIGHT-SIDED LOW BACK PAIN WITHOUT SCIATICA: ICD-10-CM

## 2025-06-11 PROCEDURE — 97164 PT RE-EVAL EST PLAN CARE: CPT | Mod: GP

## 2025-06-11 PROCEDURE — 97110 THERAPEUTIC EXERCISES: CPT | Mod: GP

## 2025-06-11 NOTE — PROGRESS NOTES
Physical Therapy Re-Eval      Patient Name: Eliseo Patricio  MRN: [id number]  Today's Date: 6/11/2025  Visit #: 10  Insurance:  0 COPAY, 10% COINS, 200 DED (MET), 1200 OOP MAX, 40 VS, NO AUTH REQ   Time Calculation  Start Time: 1621  Stop Time: 1657  Time Calculation (min): 36 min    1. Chronic pain of both knees        2. Chronic right-sided low back pain without sciatica                  ASSESSMENT:  Pt has had well managed elbows and LBP as of late. Promising that pt cont's to respond well to and benefit from strengthening to address concerns. This date pt presents w/new complaints of B knee pain. Examination findings of decreased quad strength jacqui closed eccentric strength, and pain in end range closed chain knee flexion ROM, are consistent w/his medical dx of bilateral patellofemoral DJD. These impairments limit pts ability to perform household, work, and recreational ADLs. Therefore, recommending skilled PT to improve CLOF.         GOALS:  Elbow:   Patient will improve quickDASH score to meet minimal detectable change of improvement to improve performance of ADLs. (9pts) not tested but likely met     Patient will be independent with home exercise program for proper self-management of condition.  met     Patient will improve pain free active range of motion in deficit areas for ADL completion. met     Patient will improve strength in deficit areas so patient can perform ADLs with less pain. met     Patient will subjectively report the ability to get through an entire work shift  w/familiar sx </= 1/10 while performing and no significant flare up 24hrs following activity. met     Pt will subjectively report the ability to perform gym activities jacqui related to pulling w/familiar sx </= 1/10 while performing and no significant flare up 24hrs following activity.  met      Low Back:  Patient will be independent with home exercise program for proper self-management of condition. met     Patient will improve pain  "free active range of motion in deficit areas for ADL completion. met     Patient will improve strength in deficit areas so patient can perform ADLs with less pain. met     Shoulder strength, endurance and motor control will improve while performing activities, such as, lifting, carrying and putting down 20lbs for occupational ADLs. met    Patient will subjectively report the ability to get through an entire work shift  w/familiar sx </= 1/10 while performing and no significant flare up 24hrs following activity. Near met     Pt will subjectively report the ability to perform gym activities jacqui related to lifting weights w/familiar sx </= 1/10 while performing and no significant flare up 24hrs following activity. Near met, limited now by knee pain.    KNEES: (New Established 6/11)  Patient will improve pain free active range of motion in deficit areas for ADL completion.      Patient will improve strength in deficit areas so patient can perform ADLs with less pain.     Pt will demo ability to perform a fwd step down from standard 6\" step w/good eccentric control and familiar sx </= 2/10.     Pt will demo ability to perform a body weight squat w/out familiar sx > 2/10      Next Session Considerations:  Slant bd fwd step down  Cont cybex leg extension weight and time progression  Progress lateral step down height   Add body weight squats and goblet squat as able.  Add hip ABD strength (band walks)  Consider step ups/downs w/weight     PLAN:  1x/week for 4 visits.       SUBJECTIVE:  Pt reports over the last few years his bilateral knees started bothering him. Come January of this year he went in and got a gel shot and tried PRP. These used to improve sx for up to a year, but they are now only last 4-5 months.   Provoking factors: Steps, squatting, lunges, cold weather  Alleviating factors: Voltaren gel, moving.  Pain Rating Current: 5/10  Pain at Worst: 9/10        OBJECTIVE:  Re-Eval: 10min    Knee ROM (L/R)  Extension: " "0°/0°  Flexion: 130°/130°    LE Strength Screening:    RLE LLE   L2 Hip Flex 4/5 4/5   L3 Knee Ext 4-/5 4-/5   S2 Knee Flex 4/5 4/5   L4 Ankle DF 4/5 4/5   S1 Ankle PF 4/5 4/5   ^min sx provocation w/quad MMT    Fwd Step Down: 4\" height, BUE support prn -- pain when performing w/either LE.        Additional Palpable Tenderness/Trigger Points: No palpable tenderness/trigger points reproduced at this time.     Special Tests  Anterior Drawer: Negative/Negative  Posterior Drawer: Negative/Negative  Varus at 0°: Negative/Negative  Varus at 30°: Negative/Negative  Valgus at 0°: Negative/Negative  Valgus at 30: Negative/Negative      Treatments:  Therapeutic Exercise (57407): 26 minutes  Phsyio Ball Wall Squat - 3x10, through ROM as tolerated.   Cybex Leg Extension - 2x8, 5sec isometric, 50lbs  Cybex HS Curl review  Lateral Step Downs - x10, 4\"  Patient was educated extensively on symptoms, anatomy, purpose of PT and how strengthening can help, and benefits of exercises provided in HEP. Pt provided written handout and verbal education on exercises performed below. Pt instructed to stop exercises if pain occurs during performance at home.  HEP update/review        Manual Therapy (60053):   minutes  Not performed    Neuromuscular Re-education (11989):   minutes  Not performed    Therapeutic Activitity (86361):   minutes  Not performed            HEP:  KNEE:  Access Code: [email or web address]  URL: [email or web address]  Date: 06/11/2025  Prepared by: Tariq Josue    Exercises  - Knee Extension with Weight Machine  - 1 x daily - 3-4 x weekly - 3 sets - 8-10 reps - 5sec hold  - Hamstring Curl with Weight Machine  - 1 x daily - 3-4 x weekly - 3 sets - 10 reps  - Wall Quarter Squat with Swiss Ball  - 1 x daily - 3-4 x weekly - 3 sets - 10 reps  - Lateral Step Down  - 1 x daily - 3-4 x weekly - 3 sets - 8-10 reps    LOW BACK:  - Dead Bug  - 1 x daily - 5-7 x weekly - 3 sets - 20 reps  - Standing Anti-Rotation Press " with Anchored Resistance  - 1 x daily - 5-7 x weekly - 3 sets - 10 reps  - Supine 90/90 Sciatic Nerve Glide with Knee Flexion/Extension  - 1 x daily - 5-7 x weekly - 1 sets - 8 reps  ^  Education and discussion on HEP and treatment regarding the benefits related to current condition, POC, pathophysiology, and precautions

## 2025-06-26 ENCOUNTER — APPOINTMENT (OUTPATIENT)
Dept: PHYSICAL THERAPY | Facility: CLINIC | Age: 55
End: 2025-06-26
Payer: COMMERCIAL

## 2025-06-26 DIAGNOSIS — M54.50 CHRONIC RIGHT-SIDED LOW BACK PAIN WITHOUT SCIATICA: Primary | ICD-10-CM

## 2025-06-26 DIAGNOSIS — M54.50 CHRONIC RIGHT-SIDED LOW BACK PAIN WITHOUT SCIATICA: ICD-10-CM

## 2025-06-26 DIAGNOSIS — M25.562 CHRONIC PAIN OF BOTH KNEES: Primary | ICD-10-CM

## 2025-06-26 DIAGNOSIS — G89.29 CHRONIC PAIN OF BOTH KNEES: Primary | ICD-10-CM

## 2025-06-26 DIAGNOSIS — M25.561 CHRONIC PAIN OF BOTH KNEES: Primary | ICD-10-CM

## 2025-06-26 DIAGNOSIS — G89.29 CHRONIC RIGHT-SIDED LOW BACK PAIN WITHOUT SCIATICA: ICD-10-CM

## 2025-06-26 PROCEDURE — 97110 THERAPEUTIC EXERCISES: CPT | Mod: GP

## 2025-06-26 NOTE — PROGRESS NOTES
Physical Therapy Re-Eval      Patient Name: Eliseo Patricio  MRN: [id number]  Today's Date: 6/26/2025  Visit #: 11  Insurance:  0 COPAY, 10% COINS, 200 DED (MET), 1200 OOP MAX, 40 VS, NO AUTH REQ   Time Calculation  Start Time: 1645  Stop Time: 1715  Time Calculation (min): 30 min    1. Chronic pain of both knees        2. Chronic right-sided low back pain without sciatica                  ASSESSMENT:  Pt presenting w/increase in sx as of late. He was feeling good from initial HEP performance and performed a heavy leg press at the gym. This resulted in significant sx aggravation which has been gradually improving over last few days. Reassured pt that this is not abnormal from such an abrupt progression, and discussed in depth appropriate progressions for future. Pt demoing signs of dural tension of sciatic n this date, n glide re-added to program. Verbalized understanding to all discussed. Pt to cont to benefit from skilled PT to improve CLOF.         GOALS:  Elbow:   Patient will improve quickDASH score to meet minimal detectable change of improvement to improve performance of ADLs. (9pts) not tested but likely met     Patient will be independent with home exercise program for proper self-management of condition.  met     Patient will improve pain free active range of motion in deficit areas for ADL completion. met     Patient will improve strength in deficit areas so patient can perform ADLs with less pain. met     Patient will subjectively report the ability to get through an entire work shift  w/familiar sx </= 1/10 while performing and no significant flare up 24hrs following activity. met     Pt will subjectively report the ability to perform gym activities jacqui related to pulling w/familiar sx </= 1/10 while performing and no significant flare up 24hrs following activity.  met      Low Back:  Patient will be independent with home exercise program for proper self-management of condition. met     Patient  "will improve pain free active range of motion in deficit areas for ADL completion. met     Patient will improve strength in deficit areas so patient can perform ADLs with less pain. met     Shoulder strength, endurance and motor control will improve while performing activities, such as, lifting, carrying and putting down 20lbs for occupational ADLs. met    Patient will subjectively report the ability to get through an entire work shift  w/familiar sx </= 1/10 while performing and no significant flare up 24hrs following activity. Near met     Pt will subjectively report the ability to perform gym activities jacqui related to lifting weights w/familiar sx </= 1/10 while performing and no significant flare up 24hrs following activity. Near met, limited now by knee pain.    KNEES: (New Established 6/11)  Patient will improve pain free active range of motion in deficit areas for ADL completion.      Patient will improve strength in deficit areas so patient can perform ADLs with less pain.     Pt will demo ability to perform a fwd step down from standard 6\" step w/good eccentric control and familiar sx </= 2/10.     Pt will demo ability to perform a body weight squat w/out familiar sx > 2/10      Next Session Considerations:  Slant bd fwd step down  Cont cybex leg extension weight and time progression  Progress lateral step down height   Add body weight squats and goblet squat as able.  Add hip ABD strength (band walks)  Consider step ups/downs w/weight     PLAN:  1x/week for 4 visits.       SUBJECTIVE:  Pt reports things went good in the first week then the 2nd week it went bad. He is at the point where he is just starting to get moving again.         OBJECTIVE:    Knee ROM (L/R)  Extension: 0°/0°  Flexion: 130°/130°    LE Strength Screening:    RLE LLE   L2 Hip Flex 4/5 4/5   L3 Knee Ext 4-/5 4-/5   S2 Knee Flex 4/5 4/5   L4 Ankle DF 4/5 4/5   S1 Ankle PF 4/5 4/5   ^min sx provocation w/quad MMT    Fwd Step Down: 4\" " height, BUE support prn -- pain when performing w/either LE.        Additional Palpable Tenderness/Trigger Points: No palpable tenderness/trigger points reproduced at this time.     Special Tests  Anterior Drawer: Negative/Negative  Posterior Drawer: Negative/Negative  Varus at 0°: Negative/Negative  Varus at 30°: Negative/Negative  Valgus at 0°: Negative/Negative  Valgus at 30: Negative/Negative      Treatments:  Therapeutic Exercise (51699): 30 minutes  Phsyio Ball Wall Squat - 3x10, through ROM as tolerated.   Leg Press - 3x10, 70lbs  Cybex Leg Extension - 3x10, 3sec isometric, 50lbs  Cybex HS Curl - 3x10, 70lbs  Supine Sciatic n glide. Pt educated extensively again on goal of graded exposure, and gradual progressions for strengthening to improve tolerance and avoid sx aggravation.   HEP update/review        Manual Therapy (51531):   minutes  Not performed    Neuromuscular Re-education (36438):   minutes  Not performed    Therapeutic Activitity (83944):   minutes  Not performed            HEP:    KNEE:  Access Code: [email or web address]  URL: [email or web address]  Date: 06/11/2025  Prepared by: Tariq Josue    Exercises  - Knee Extension with Weight Machine  - 1 x daily - 3-4 x weekly - 3 sets - 8-10 reps - 5sec hold  - Hamstring Curl with Weight Machine  - 1 x daily - 3-4 x weekly - 3 sets - 10 reps  - Wall Quarter Squat with Swiss Ball  - 1 x daily - 3-4 x weekly - 3 sets - 10 reps  - Lateral Step Down  - 1 x daily - 3-4 x weekly - 3 sets - 8-10 reps      LOW BACK:  - Dead Bug  - 1 x daily - 5-7 x weekly - 3 sets - 20 reps  - Standing Anti-Rotation Press with Anchored Resistance  - 1 x daily - 5-7 x weekly - 3 sets - 10 reps  - Supine 90/90 Sciatic Nerve Glide with Knee Flexion/Extension  - 1 x daily - 5-7 x weekly - 1 sets - 8 reps  ^  Education and discussion on HEP and treatment regarding the benefits related to current condition, POC, pathophysiology, and precautions

## 2025-06-27 NOTE — PROGRESS NOTES
Subjective   HPI:  55 y.o. yo male patient complains of  symptoms consistent with hypogonadism.  PCP notes reviewed.    Previous use of testosterone: Yes, describe: on T injection once every 2 weeks.  He is unsure about why his PCP referred to us, though he believes it is for dose increase of meds.    These include:  Decreased libido: Currently not much sexually active.   Decreased energy: yes  Decreased muscle mass: yes  Erectile Dysfunction: 1 year ago; not much difference with T therapy. Tried Viagra long time ago.     Denies pain or curvature of the penis.    No history of heart attack or use of NTG.    He has plan for weight loss with Ozempic.    Want to preserve fertility: No  Personal history of gynecomastia and/or concerns about the condition: MNo  Family / Personal History of Prostate Cancer: No  MI or Stroke: No    Lab Results   Component Value Date    TESTOSTERONE 490 05/07/2025     Component      Latest Ref Rng 5/7/2025   GLUCOSE      65 - 99 mg/dL 100 (H)    UREA NITROGEN (BUN)      7 - 25 mg/dL 31 (H)    CREATININE      0.70 - 1.30 mg/dL 1.11    EGFR      > OR = 60 mL/min/1.73m2 78    SODIUM      135 - 146 mmol/L 141    POTASSIUM      3.5 - 5.3 mmol/L 4.8    CHLORIDE      98 - 110 mmol/L 103    CARBON DIOXIDE      20 - 32 mmol/L 29    ELECTROLYTE BALANCE      7 - 17 mmol/L (calc) 9    CALCIUM      8.6 - 10.3 mg/dL 9.3    PROTEIN, TOTAL      6.1 - 8.1 g/dL 6.7    ALBUMIN      3.6 - 5.1 g/dL 4.5    BILIRUBIN, TOTAL      0.2 - 1.2 mg/dL 0.6    ALKALINE PHOSPHATASE      35 - 144 U/L 60    AST      10 - 35 U/L 30    ALT      9 - 46 U/L 31    CHOLESTEROL, TOTAL      <200 mg/dL 120    HDL CHOLESTEROL      > OR = 40 mg/dL 42    TRIGLYCERIDES      <150 mg/dL 80    LDL-CHOLESTEROL      mg/dL (calc) 62    CHOL/HDLC RATIO      <5.0 (calc) 2.9    NON HDL CHOLESTEROL      <130 mg/dL (calc) 78    HEMOGLOBIN A1c      <5.7 % 5.8 (H)    eAG (mg/dL)      mg/dL 120    eAG (mmol/L)      mmol/L 6.6       Legend:  (H)  High    PMH:  Medical History[1]     PSH:  Surgical History[2]     Medications:  Current Medications[3]    Social Hx:  Social History[4]   [unfilled]    FHx:  family history includes Sleep apnea in his father.     Allergy:  Allergies[5]     Exam  NAD  Nonlabored breathing  Abd nondistended    Assessment/Plan:    #Hypogonadism  Reviewed current levels, appropriate on therapy  Continue therapy with Dr. Purvis.    #Erectile Dysfunction: I discussed the etiology and different treatment modalities for erectile dysfunction. Specifically, we talked about lifestyle factors like smoking, diet, and exercise. We also discussed ED as a sign of systemic disease, and the contributions of elevated cholesterol and elevated blood sugars on erections. We then discussed treatment modalities, specifically PDE5 inhibitors, intracavernosal injections, vacuum erectile devices, and penile prostheses.    ED handout provided  Trial of Cialis 20mg - medication counseling done. Can start with half tab. Discussed side effects including priapism, headaches, stuffiness, and back pain. Discussed that if he gets an erection >4 hours, he needs to present to the emergency room or risk worsening of his erectile dysfunction long term.       Fu in 2 months with NP Christel VINES 221Daniel  Visit complexity inherent to evaluation and management (E&M) associated with medical care services that serve as the continuing focal point for all needed health care services and/or with medical care services that are part of ongoing care related to a patient's single, serious condition or a complex condition.      Scribe Attestation  By signing my name below, IAshly Scrwaleska   attest that this documentation has been prepared under the direction and in the presence of Pedro Westbrook MD.         [1] No past medical history on file.  [2]   Past Surgical History:  Procedure Laterality Date    OTHER SURGICAL HISTORY  12/12/2019    Ladd tooth  extraction    OTHER SURGICAL HISTORY  12/12/2019    Tonsillectomy   [3]   Current Outpatient Medications:     clobetasol (Temovate) 0.05 % ointment, Use twice a day, daily, to the hands up to 4 weeks and then decrease to twice a day, 3 times a week, Disp: 60 g, Rfl: 3    cyclobenzaprine (Flexeril) 10 mg tablet, Take 1 tablet (10 mg) by mouth 3 times a day as needed for muscle spasms., Disp: 90 tablet, Rfl: 0    levothyroxine (Synthroid, Levoxyl) 175 mcg tablet, Take 1 tablet (175 mcg) by mouth early in the morning.. Take on an empty stomach at the same time each day, either 30 to 60 minutes prior to breakfast, Disp: 90 tablet, Rfl: 1    meloxicam (Mobic) 15 mg tablet, Take 1 tablet (15 mg) by mouth once daily., Disp: 30 tablet, Rfl: 2    tadalafil 20 mg tablet, Take 1 tablet (20 mg) by mouth if needed for erectile dysfunction (Start with half tab. Take 2 hours prior to wanting erection.If you get an erection over 4 hours, go to the emergency room.)., Disp: 30 tablet, Rfl: 6    testosterone cypionate (Depo-Testosterone) 200 mg/mL injection, Inject 1 mL (200 mg) into the muscle every 14 (fourteen) days., Disp: 3 mL, Rfl: 0    triamcinolone (Kenalog) 0.1 % ointment, Use twice a day to affected area on the arms up to 2 weeks. Then take a 1 week break. Repeat as needed., Disp: 80 g, Rfl: 3  [4]   Social History  Socioeconomic History    Marital status:    Tobacco Use    Smoking status: Former     Types: Cigarettes     Passive exposure: Never    Smokeless tobacco: Never   Vaping Use    Vaping status: Never Used   Substance and Sexual Activity    Alcohol use: Not Currently    Drug use: Not Currently    Sexual activity: Defer   [5]   Allergies  Allergen Reactions    Penicillins Hives

## 2025-06-30 ENCOUNTER — OFFICE VISIT (OUTPATIENT)
Dept: UROLOGY | Facility: HOSPITAL | Age: 55
End: 2025-06-30
Payer: COMMERCIAL

## 2025-06-30 DIAGNOSIS — N52.9 ERECTILE DYSFUNCTION, UNSPECIFIED ERECTILE DYSFUNCTION TYPE: Primary | ICD-10-CM

## 2025-06-30 DIAGNOSIS — E29.1 HYPOGONADISM IN MALE: ICD-10-CM

## 2025-06-30 DIAGNOSIS — Z12.5 PROSTATE CANCER SCREENING: ICD-10-CM

## 2025-06-30 PROCEDURE — 99204 OFFICE O/P NEW MOD 45 MIN: CPT | Performed by: UROLOGY

## 2025-06-30 PROCEDURE — 99214 OFFICE O/P EST MOD 30 MIN: CPT | Performed by: UROLOGY

## 2025-06-30 RX ORDER — TADALAFIL 20 MG/1
20 TABLET ORAL AS NEEDED
Qty: 30 TABLET | Refills: 6 | Status: SHIPPED | OUTPATIENT
Start: 2025-06-30

## 2025-06-30 NOTE — PROGRESS NOTES
7/9/2025 new patient visit         Sleep Survey  Woolford sleepiness scale  Fatigue severity scale  Nasal obstruction and Septoplasty effectiveness scale  Nasal obstruction VAS  Snoring VAS     Sleep study histories: (personally reviewed raw data such as interpretation report, data sheet, hypnogram, and titration table)  Home sleep study 2/13/2025  AHI 3% was 33.0 supine was 71.8 and non supine was 24.4 events per hour  AHI 4% was 23.1 supine was 64.0 and non supine was 14.2 events per hour  Pt spent 83.7 % of study time with oxygen saturations <= 90%  Pt spent 34.8% of study time with oxygen saturations <= 88%  SPO2 hans was 63.9%     Weight management    Oral appliance history    PAP Provider      The patient's referring provider is: Faizan Purvis MD    HISTORY OF PRESENT ILLNESS   Eliseo Patricio is a 55 y.o. male who presents to a [facility] for a sleep medicine evaluation with concerns of Sleep Apnea, Excessive Daytime Sleepiness, Review Sleep Study Results, and Unrefreshing Sleep.     PAST SLEEP HISTORY    Pmhx includes thyroid disease, low testosterone level, former smoker.     Patient had a sleep study done in February 2025 due to snoring and being on testosterone. He was found to have sleep apnea and was started on pap therapy. He used it for a few months regularly but started to have congestion in his nose around may 2025 and stopped using it.     CURRENT HISTORY    On today's visit, 6/9/2025, the patient reports that he is establishing care for his sleep apnea.     When he used pap therapy:   Pap benefit: more refreshing sleep, decreased/ eliminated: snoring/ daytime sleepiness. Improved focus, more energy during the day.     Pap issues: admits to chronic dry mouth, denies skin irritation, denies perceived mask air leak.     Sleep schedule  on weekdays / work days:  Usual Bedtime: 11pm  Sleep latency:  varies   Wake time : 4:30am  Total sleep time  average/day: 5 hours/day  Awakenings: 1x per night, nocturia, short.   Naps: rarely     Sleep schedule  on weekends/non work days :  Usual Bedtime:  12: 30am   Wake time : 7am    Sleep aids: n  Stimulants: n    Occupation: construction work.     Preferred sleeping position: SLEEP POSITION: sidelying    Sleep-related ROS:    Snoring:  not with pap therapy.   Witnessed apneas:   n     Gasping/ choking: n   Am Dry mouth:  y           Nasal congestion: y        am headaches: no    Sleep is described as unrefreshing.     Daytime sleepiness: n  Fatigue or decreased energy: n    Drowsy driving: n  Hx of car accident: n  Near-miss Car accident: n      RLS screen:  RLSSCREEN: - Sensations: Patient does not have unusual sensations in their extremities that cause an urge to move them     Sleep-related behaviors: DENIES    ESS:1       REVIEW OF SYSTEMS     REVIEW OF SYSTEMS  Review of Systems   All other systems reviewed and are negative.    FAMILY HISTORY  [Family History]    [Family History]  Problem Relation Name Age of Onset    Sleep apnea Father       DOES/DOES NOT EC: does have a family history of sleep disorder.    ASSESSMENT/PLAN     Mr. Patricio is a 55 y.o. male and  has no past medical history on file. He was referred to the [facility] for evaluation of sleep apnea.     Problem List Items Addressed This Visit    None  Visit Diagnoses         Obstructive sleep apnea                Problem List and Orders    Pmhx includes thyroid disease, low testosterone level, former smoker.     1-DAVID  HST 2/13/2025--> severe DAVID, AHI 3% 33, AHI 4% 23.1, SpO2 hans 63.9%.     Reviewed and discussed the above sleep study results and management options in details. All questions answered, patient verbalizes understanding.     -was using autopap 5-15cwp, rAHI 3, median pressure 6.7, max avg 11, needs to use pap therapy more regularly, some days with a large leak.     -restart pap therapy.  Counseled on the  importance of compliance.   -Please work with your DME to find a mask that fits you well, and controls your leak.  -provided a sample mask cushions during clinic today.     - Order supplies and mask refitting through [facility]  You can use a chin strap to help with your dry mouth, you can also adjust your humidity level to your comfort. You can use a nasal spray to help you breathe better from your nose.    He had nose bleeds with flonase sometimes; counseled on proper use. He will follow up with his ENT specialist.     -do not drive or operate heavy machinery if drowsy.  -avoid sleeping on your back.   -avoid sedating substances/ medication, alcohol, illicit drugs and tobacco.    2- Obesity  counseled on eating a healthy diet and exercising as tolerated.      3- allergic rhinitis  Follow up with ent          Referred for an initial consultation with a long history of reported loud snoring, waking up feeling unrefreshed, excessive daytime fatigue. Chronic nasal obstruction.     Sleep study histories: (personally reviewed raw data such as interpretation report, data sheet, hypnogram, and titration table)    For nocturnal symptoms (without or prior to treatment), he endorses that there is  snoring, witnessed apneas, nocturia and restless sleep.    For daytime symptoms (without or prior to treatment), he endorses that there is morning headaches,  irritability and depression.    Patient does reports nasal obstruction.  It does fluctuate, and has been treated with nasal corticosteroids without significant improvements.  Patient recently had another trial of flonase without improvement in nasal complaints. He does not have a history of nasal surgery or upper airway surgery. He does not have a history of nasal trauma. He does not describe a history of facial pressure type of headaches.        PHYSICAL EXAMINATION:  Constitutional:  No acute distress  Voice:  No hoarseness or other abnormality   Respiration:  Breathing  comfortably, no stridor  Eyes:  EOM intact, sclera normal  Neuro:  Alert and conversive  Head and Face:  Symmetric facial features, no masses or lesions. Some redness/flaky skin around forehead and cheeks.   Salivary Glands:  Parotid and submandibular glands normal bilaterally  Right Ear:  Normal external ear, external auditory canal, and TM to otoscopy  Left Ear: Normal external ear, external auditory canal, and TM to otoscopy  Nose:  External nose midline, septum is deviated to the left and turbinates are significantly enlarged 3+/4+  Oral Cavity/Oropharynx/Lips:  Normal mucous membranes, normal floor of mouth/tongue/OP, no masses or lesions, tonsils 2+. Mallampati/Ybarra grade 3  Neck/Lymph:  No LAD, no thyroid masses, trachea midline  Skin:  Neck skin is without scar or injury  Psych:  Alert with appropriate mood and affect      Procedure: Diagnostic Nasal/ Pharyngeal Endoscopy     Indication for procedure: To evaluate static and dynamic upper anatomy not visible by anterior rhinoscopy and oral cavitiy examination for anatomic risk factors of obstructive sleep apnea.      Anesthesia: 1% phenylephrine,4% lidocaine topical spray     Description:   A flexible endoscope was used to examine the left and right nasal cavities.  The nasal valve areas were examined for abnormalities or collapse.  The inferior and middle turbinates were evaluated and abnormalities noted. The scope was then passed through the nasopharygneal, oropharyngeal, and hypopharyngeal airway. The Gutierrez Maneuver was performed with the patient in sitting position.Collapse was assessed during a maximal inspiratory effort against a closed mouth and sealed nose. The patient tolerated the procedure without complications and was returned to ambulatory status.       Findings:   Procedure: Diagnostic Nasal/ Pharyngeal Endoscopy    Indication for procedure: To evaluate sinonasal and pharyngeal anatomy not visible by anterior rhinoscopy and oral cavitiy  examination    Anesthesia: 1% phenylephrine,4% lidocaine topical spray    Description:   A flexible endoscope was used to examine the left and right nasal cavities.  The nasal valve areas were examined for abnormalities or collapse.  The inferior and middle turbinates were evaluated and abnormalities noted.  The middle and superior meastuses, and the sphenoethmoid recesses were examined and inspected for mucopurulence and polyps.  The nasopharyngeal anatomy including posterior nasopharyngeal wall, auditory fabiana soft palate, and fossae of Rosenmuller were examined.  Abnormalities were noted in the above mentioned findings.  The patient tolerated the procedure without complications and was returned to ambulatory status.      Findings:   Internal nasal valve - no significant collapse  Inferior turbinates - bilat enlarged, pale mucosa  Septum - septal deviation to the left  Middle meatuses - Patent and clear.  Superior meatuses and sphenoethmoid recesses - patent and clear  Nasopharynx - clear without lesions or masses  Coupling - Positive  Narrowed retropalatal space.   Patient could not tolerate full exam       Assessment and Plan:  Chronic nasal obstruction due to septum deviation and turbinate engorgement  DAVID not tolerant to PAP

## 2025-07-02 NOTE — PROGRESS NOTES
Physical Therapy Re-Eval      Patient Name: Eliseo Patricio  MRN: [id number]  Today's Date: 7/3/2025  Visit #: 12  Insurance:  0 COPAY, 10% COINS, 200 DED (MET), 1200 OOP MAX, 40 VS, NO AUTH REQ   Time Calculation  Start Time: 1642  Stop Time: 1712  Time Calculation (min): 30 min    1. Chronic pain of both knees        2. Chronic right-sided low back pain without sciatica                  ASSESSMENT:  Pt presenting with much improved sx compared to last. Making steady progress w/strength which is having good carryover to pain and mobility. Able to tolerate slow eccentric step down this date which is promising. Cont'd to emphasize gradual buildup of his gym exercises. HEP updated, all questions answered. Pt to cont to benefit from skilled PT to improve CLOF.          GOALS:  Elbow:   Patient will improve quickDASH score to meet minimal detectable change of improvement to improve performance of ADLs. (9pts) not tested but likely met     Patient will be independent with home exercise program for proper self-management of condition.  met     Patient will improve pain free active range of motion in deficit areas for ADL completion. met     Patient will improve strength in deficit areas so patient can perform ADLs with less pain. met     Patient will subjectively report the ability to get through an entire work shift  w/familiar sx </= 1/10 while performing and no significant flare up 24hrs following activity. met     Pt will subjectively report the ability to perform gym activities jacqui related to pulling w/familiar sx </= 1/10 while performing and no significant flare up 24hrs following activity.  met      Low Back:  Patient will be independent with home exercise program for proper self-management of condition. met     Patient will improve pain free active range of motion in deficit areas for ADL completion. met     Patient will improve strength in deficit areas so patient can perform ADLs with less pain. met    "  Shoulder strength, endurance and motor control will improve while performing activities, such as, lifting, carrying and putting down 20lbs for occupational ADLs. met    Patient will subjectively report the ability to get through an entire work shift  w/familiar sx </= 1/10 while performing and no significant flare up 24hrs following activity. Near met     Pt will subjectively report the ability to perform gym activities jacqui related to lifting weights w/familiar sx </= 1/10 while performing and no significant flare up 24hrs following activity. Near met, limited now by knee pain.    KNEES: (New Established 6/11)  Patient will improve pain free active range of motion in deficit areas for ADL completion.      Patient will improve strength in deficit areas so patient can perform ADLs with less pain.     Pt will demo ability to perform a fwd step down from standard 6\" step w/good eccentric control and familiar sx </= 2/10.     Pt will demo ability to perform a body weight squat w/out familiar sx > 2/10      Next Session Considerations:  Slant bd fwd step down  Cont cybex leg extension weight and time progression  Progress lateral step down height   Add body weight squats and goblet squat as able.  Add hip ABD strength (band walks)  Consider step ups/downs w/weight     PLAN:  1x/week for 4 visits.       SUBJECTIVE:  Pt reports things are moving a little better than last week, not as painful. The nerve pain seems better as well. Has been doing exercises about 3x/week.       OBJECTIVE:    Treatments:  Therapeutic Exercise (42985): 30 minutes  Recumbent Bike warmup - 5min  Leg Press - 3x10, 100lbs  Lateral Step Downs*  Lateral Band Walks w/tb at ankles - green*  Body Weight squats*  HEP update    (* = added to HEP)      Manual Therapy (41445):   minutes  Not performed    Neuromuscular Re-education (83469):   minutes  Not performed    Therapeutic Activitity (61755):   minutes  Not performed            HEP:    KNEE:  Access " Code: [email or web address]  URL: [email or web address]  Date: 06/11/2025  Prepared by: Tariq Josue    Exercises  - Knee Extension with Weight Machine  - 1 x daily - 3-4 x weekly - 3 sets - 8-10 reps - 5sec hold  - Hamstring Curl with Weight Machine  - 1 x daily - 3-4 x weekly - 3 sets - 10 reps  - Wall Quarter Squat with Swiss Ball  - 1 x daily - 3-4 x weekly - 3 sets - 10 reps  - Lateral Step Down  - 1 x daily - 3-4 x weekly - 3 sets - 8-10 reps      LOW BACK:  - Dead Bug  - 1 x daily - 5-7 x weekly - 3 sets - 20 reps  - Standing Anti-Rotation Press with Anchored Resistance  - 1 x daily - 5-7 x weekly - 3 sets - 10 reps  - Supine 90/90 Sciatic Nerve Glide with Knee Flexion/Extension  - 1 x daily - 5-7 x weekly - 1 sets - 8 reps  ^  Education and discussion on HEP and treatment regarding the benefits related to current condition, POC, pathophysiology, and precautions

## 2025-07-03 ENCOUNTER — APPOINTMENT (OUTPATIENT)
Dept: PHYSICAL THERAPY | Facility: CLINIC | Age: 55
End: 2025-07-03
Payer: COMMERCIAL

## 2025-07-03 DIAGNOSIS — M54.50 CHRONIC RIGHT-SIDED LOW BACK PAIN WITHOUT SCIATICA: Primary | ICD-10-CM

## 2025-07-03 DIAGNOSIS — M25.561 CHRONIC PAIN OF BOTH KNEES: Primary | ICD-10-CM

## 2025-07-03 DIAGNOSIS — G89.29 CHRONIC RIGHT-SIDED LOW BACK PAIN WITHOUT SCIATICA: Primary | ICD-10-CM

## 2025-07-03 DIAGNOSIS — M54.50 CHRONIC RIGHT-SIDED LOW BACK PAIN WITHOUT SCIATICA: ICD-10-CM

## 2025-07-03 DIAGNOSIS — M25.562 CHRONIC PAIN OF BOTH KNEES: Primary | ICD-10-CM

## 2025-07-03 DIAGNOSIS — G89.29 CHRONIC RIGHT-SIDED LOW BACK PAIN WITHOUT SCIATICA: ICD-10-CM

## 2025-07-03 PROCEDURE — 97110 THERAPEUTIC EXERCISES: CPT | Mod: GP

## 2025-07-09 ENCOUNTER — APPOINTMENT (OUTPATIENT)
Dept: OTOLARYNGOLOGY | Facility: CLINIC | Age: 55
End: 2025-07-09
Payer: COMMERCIAL

## 2025-07-09 VITALS — WEIGHT: 233 LBS | HEIGHT: 74 IN | BODY MASS INDEX: 29.9 KG/M2

## 2025-07-09 DIAGNOSIS — G47.33 OSA (OBSTRUCTIVE SLEEP APNEA): ICD-10-CM

## 2025-07-09 DIAGNOSIS — Z01.818 PREOPERATIVE CLEARANCE: ICD-10-CM

## 2025-07-09 PROCEDURE — 3008F BODY MASS INDEX DOCD: CPT

## 2025-07-09 PROCEDURE — 31575 DIAGNOSTIC LARYNGOSCOPY: CPT

## 2025-07-09 PROCEDURE — 99204 OFFICE O/P NEW MOD 45 MIN: CPT

## 2025-07-09 RX ORDER — FLUTICASONE PROPIONATE 50 MCG
2 SPRAY, SUSPENSION (ML) NASAL DAILY
Qty: 16 G | Refills: 11 | Status: SHIPPED | OUTPATIENT
Start: 2025-07-09 | End: 2026-07-09

## 2025-07-10 ENCOUNTER — APPOINTMENT (OUTPATIENT)
Dept: PHYSICAL THERAPY | Facility: CLINIC | Age: 55
End: 2025-07-10
Payer: COMMERCIAL

## 2025-07-10 DIAGNOSIS — M54.50 CHRONIC RIGHT-SIDED LOW BACK PAIN WITHOUT SCIATICA: Primary | ICD-10-CM

## 2025-07-10 DIAGNOSIS — G89.29 CHRONIC RIGHT-SIDED LOW BACK PAIN WITHOUT SCIATICA: Primary | ICD-10-CM

## 2025-07-10 NOTE — PROGRESS NOTES
Physical Therapy Re-Eval      Patient Name: Eliseo Patricio  MRN: [id number]  Today's Date: 7/10/2025  Visit #: 13  Insurance:  0 COPAY, 10% COINS, 200 DED (MET), 1200 OOP MAX, 40 VS, NO AUTH REQ        1. Chronic right-sided low back pain without sciatica                  ASSESSMENT:  ***        GOALS:  Elbow:   Patient will improve quickDASH score to meet minimal detectable change of improvement to improve performance of ADLs. (9pts) not tested but likely met     Patient will be independent with home exercise program for proper self-management of condition.  met     Patient will improve pain free active range of motion in deficit areas for ADL completion. met     Patient will improve strength in deficit areas so patient can perform ADLs with less pain. met     Patient will subjectively report the ability to get through an entire work shift  w/familiar sx </= 1/10 while performing and no significant flare up 24hrs following activity. met     Pt will subjectively report the ability to perform gym activities jacqui related to pulling w/familiar sx </= 1/10 while performing and no significant flare up 24hrs following activity.  met      Low Back:  Patient will be independent with home exercise program for proper self-management of condition. met     Patient will improve pain free active range of motion in deficit areas for ADL completion. met     Patient will improve strength in deficit areas so patient can perform ADLs with less pain. met     Shoulder strength, endurance and motor control will improve while performing activities, such as, lifting, carrying and putting down 20lbs for occupational ADLs. met    Patient will subjectively report the ability to get through an entire work shift  w/familiar sx </= 1/10 while performing and no significant flare up 24hrs following activity. Near met     Pt will subjectively report the ability to perform gym activities jacqui related to lifting weights w/familiar sx </= 1/10  "while performing and no significant flare up 24hrs following activity. Near met, limited now by knee pain.    KNEES: (New Established 6/11)  Patient will improve pain free active range of motion in deficit areas for ADL completion.      Patient will improve strength in deficit areas so patient can perform ADLs with less pain.     Pt will demo ability to perform a fwd step down from standard 6\" step w/good eccentric control and familiar sx </= 2/10.     Pt will demo ability to perform a body weight squat w/out familiar sx > 2/10      Next Session Considerations:  Slant bd fwd step down  Cont cybex leg extension weight and time progression  Progress lateral step down height   Add body weight squats and goblet squat as able.  Add hip ABD strength (band walks)  Consider step ups/downs w/weight     PLAN:  1x/week for 4 visits.       SUBJECTIVE:  ***      OBJECTIVE:    Treatments:  Therapeutic Exercise (29142):   minutes  Recumbent Bike warmup - 5min  Leg Press - 3x10, 100lbs  Lateral Step Downs*  Lateral Band Walks w/tb at ankles - green*  Body Weight squats*  HEP update    (* = added to HEP)      Manual Therapy (57644):   minutes  Not performed    Neuromuscular Re-education (03638):   minutes  Not performed    Therapeutic Activitity (35377):   minutes  Not performed            HEP:    KNEE:  Access Code: [email or web address]  URL: [email or web address]  Date: 06/11/2025  Prepared by: Tariq Josue    Exercises  - Knee Extension with Weight Machine  - 1 x daily - 3-4 x weekly - 3 sets - 8-10 reps - 5sec hold  - Hamstring Curl with Weight Machine  - 1 x daily - 3-4 x weekly - 3 sets - 10 reps  - Wall Quarter Squat with Swiss Ball  - 1 x daily - 3-4 x weekly - 3 sets - 10 reps  - Lateral Step Down  - 1 x daily - 3-4 x weekly - 3 sets - 8-10 reps      LOW BACK:  - Dead Bug  - 1 x daily - 5-7 x weekly - 3 sets - 20 reps  - Standing Anti-Rotation Press with Anchored Resistance  - 1 x daily - 5-7 x weekly - 3 sets - " 10 reps  - Supine 90/90 Sciatic Nerve Glide with Knee Flexion/Extension  - 1 x daily - 5-7 x weekly - 1 sets - 8 reps  ^  Education and discussion on HEP and treatment regarding the benefits related to current condition, POC, pathophysiology, and precautions

## 2025-07-11 LAB — TSH SERPL-ACNC: 3.03 MIU/L (ref 0.4–4.5)

## 2025-07-13 DIAGNOSIS — E29.1 HYPOGONADISM IN MALE: ICD-10-CM

## 2025-07-14 ENCOUNTER — PATIENT MESSAGE (OUTPATIENT)
Dept: DERMATOLOGY | Facility: HOSPITAL | Age: 55
End: 2025-07-14

## 2025-07-14 ENCOUNTER — OFFICE VISIT (OUTPATIENT)
Dept: DERMATOLOGY | Facility: HOSPITAL | Age: 55
End: 2025-07-14
Payer: COMMERCIAL

## 2025-07-14 DIAGNOSIS — L81.4 LENTIGO: ICD-10-CM

## 2025-07-14 DIAGNOSIS — L40.9 PSORIASIS: Primary | ICD-10-CM

## 2025-07-14 DIAGNOSIS — D22.9 MULTIPLE BENIGN NEVI: ICD-10-CM

## 2025-07-14 DIAGNOSIS — Z12.83 SCREENING EXAM FOR SKIN CANCER: ICD-10-CM

## 2025-07-14 DIAGNOSIS — L57.8 ACTINIC SKIN DAMAGE: ICD-10-CM

## 2025-07-14 DIAGNOSIS — L82.1 SEBORRHEIC KERATOSIS: ICD-10-CM

## 2025-07-14 DIAGNOSIS — Z80.8 FAMILY HISTORY OF SKIN CANCER: ICD-10-CM

## 2025-07-14 PROCEDURE — 99214 OFFICE O/P EST MOD 30 MIN: CPT | Performed by: DERMATOLOGY

## 2025-07-14 PROCEDURE — 1036F TOBACCO NON-USER: CPT | Performed by: DERMATOLOGY

## 2025-07-14 ASSESSMENT — DERMATOLOGY PATIENT ASSESSMENT
DO YOU HAVE ANY NEW OR CHANGING LESIONS: NO
HAVE YOU HAD OR DO YOU HAVE VASCULAR DISEASE: NO
HAVE YOU HAD OR DO YOU HAVE A STAPH INFECTION: NO
DO YOU USE A TANNING BED: NO
ARE YOU AN ORGAN TRANSPLANT RECIPIENT: NO

## 2025-07-14 ASSESSMENT — DERMATOLOGY QUALITY OF LIFE (QOL) ASSESSMENT
RATE HOW BOTHERED YOU ARE BY SYMPTOMS OF YOUR SKIN PROBLEM (EG, ITCHING, STINGING BURNING, HURTING OR SKIN IRRITATION): 0 - NEVER BOTHERED
RATE HOW BOTHERED YOU ARE BY EFFECTS OF YOUR SKIN PROBLEMS ON YOUR ACTIVITIES (EG, GOING OUT, ACCOMPLISHING WHAT YOU WANT, WORK ACTIVITIES OR YOUR RELATIONSHIPS WITH OTHERS): 0 - NEVER BOTHERED
RATE HOW EMOTIONALLY BOTHERED YOU ARE BY YOUR SKIN PROBLEM (FOR EXAMPLE, WORRY, EMBARRASSMENT, FRUSTRATION): 0 - NEVER BOTHERED
ARE THERE EXCLUSIONS OR EXCEPTIONS FOR THE QUALITY OF LIFE ASSESSMENT: NO
DATE THE QUALITY-OF-LIFE ASSESSMENT WAS COMPLETED: 67400

## 2025-07-14 ASSESSMENT — PATIENT GLOBAL ASSESSMENT (PGA): PATIENT GLOBAL ASSESSMENT: PATIENT GLOBAL ASSESSMENT:  1 - CLEAR

## 2025-07-14 ASSESSMENT — ITCH NUMERIC RATING SCALE: HOW SEVERE IS YOUR ITCHING?: 1

## 2025-07-14 NOTE — Clinical Note
Well-demarcated light pink patches on palms and soles that are partially treated with fissuring. Partially treated plaques on extensor elbows and knees bilaterally as well. A few scattered small thin plaques on shoulders and forearms.     Body surface area:  6%   Joint abnormalities present - but imaging suggestive of OA - MRI's of both knees 5/2025 demonstrated osteoarthritis, no evidence of psoriatic arthritis. MRI of elbow 1/2025 demonstrated OA as well.

## 2025-07-14 NOTE — PROGRESS NOTES
Subjective     Eliseo Patricio is a 55 y.o. male who presents for the following: Skin Check and Rash. Last derm visit 5/12/25 for eczema vs psoriasis    He has knee pain, MRI's of both knees 5/2025 demonstrated osteoarthritis, no evidence of psoriatic arthritis. MRI of elbow 1/2025 demonstrated OA as well.         Intake Questions  Do you have any new or changing Lesions?: No  Are you an organ transplant recipient?: No  Have you had or do you have a Staph Infection?: No  Have you had or do you have Vacular Disease?: No  Do you use sunscreen?: None  Do you use a tanning bed?: No    Review of Systems:  No other skin or systemic complaints other than what is documented elsewhere in the note.    The following portions of the chart were reviewed this encounter and updated as appropriate:         Skin Cancer History  Biopsy Log Book  No skin cancers from Specimen Tracking.    Additional History      Specialty Problems    None       Objective   Well appearing patient in no apparent distress; mood and affect are within normal limits.    A full examination was performed including scalp, head, eyes, ears, nose, lips, neck, chest, axillae, abdomen, back, buttocks, bilateral upper extremities, bilateral lower extremities, hands, feet, fingers, toes, fingernails, and toenails. All findings within normal limits unless otherwise noted below.    Assessment/Plan   Skin Exam  1. PSORIASIS  Left Elbow - Posterior, Left Foot - Posterior, Left Hand - Anterior, Left Knee - Anterior, Right Elbow - Posterior, Right Foot - Posterior, Right Hand - Anterior, Right Knee - Anterior  Well-demarcated light pink patches on palms and soles that are partially treated with fissuring. Partially treated plaques on extensor elbows and knees bilaterally as well. A few scattered small thin plaques on shoulders and forearms.     Body surface area:  6%   Joint abnormalities present - but imaging suggestive of OA - MRI's of both knees 5/2025  demonstrated osteoarthritis, no evidence of psoriatic arthritis. MRI of elbow 1/2025 demonstrated OA as well.          Palmoplantar psoriasis is now favored  - clinical picture has evolved since 5/2025 with psoriasis now favored based on exam  - The patient also has a known family history of eczema and asthma. 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     Prior treatment:  - clobetasol 0.05% ointment and triamcinolone 0.1% ointment with transient improvement but then immediate recurrence within 1-2 weeks of stopping  - discussed other options, due to high and worsening BSA affected, will proceed with a systemic agent. He would prefer to start with the most effective agent, will submit for Skyrizi pending labs. Risks of skyrizi and acitretin were discussed. Info in AVS    Labs:  5/1/23 - hep C nonreactive  5/7/25 - CBC, CMP unremarkable    Check:  Related Procedures  HIV 1/2 Antigen/Antibody Screen with Reflex to Confirmation  Hepatitis B Surface Antigen  Hepatitis B Surface Antibody  Hepatitis B Core Antibody, Total  T-SPOT (Tb)  Follow Up In Dermatology - Established Patient  2. MULTIPLE BENIGN NEVI  Generalized  Brown and tan macules and papules with reassuring findings on dermoscopy  -These lesions have benign, reassuring patterns on dermoscopy  -Recommend continued self observation, and to contact the office if any changes in nevi are noticed  3. LENTIGO  Generalized  Tan macules  -Benign appearing on exam  -Reassurance, recommend observation  4. SEBORRHEIC KERATOSIS  Generalized  Stuck on, waxy macule(s)/papule(s)/plaque(s) with comedo-like openings and milia like cysts  -Discussed the nature of the diagnosis  -Reassurance, recommend continued observation  5. ACTINIC SKIN DAMAGE  Generalized  Background of photodamage with hyper- and  hypo-pigmented macules on the skin  6. FAMILY HISTORY OF SKIN CANCER  Generalized  7. SCREENING EXAM FOR SKIN CANCER  Generalized  Full body skin exam  -No lesions concerning for malignancy on the remainder the skin exam today other than what may be separately documented  - The ugly duckling sign was discussed. Monitor for any skin lesions that are different in color, shape, or size than others on body  -Sun protection was discussed. Recommend SPF 30+, hats with brims, sun protective clothing, and avoiding sun exposure between 10 AM and 2 PM whenever possible  -Recommend regular skin exams or sooner if new or changing lesions       Follow up 3-4 months psoriasis

## 2025-07-14 NOTE — Clinical Note
Palmoplantar psoriasis is now favored  - clinical picture has evolved since 5/2025 with psoriasis now favored based on exam  - The patient also has a known family history of eczema and asthma. 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     Prior treatment:  - clobetasol 0.05% ointment and triamcinolone 0.1% ointment with transient improvement but then immediate recurrence within 1-2 weeks of stopping  - discussed other options, due to high and worsening BSA affected, will proceed with a systemic agent. He would prefer to start with the most effective agent, will submit for Skyrizi pending labs. Risks of skyrizi and acitretin were discussed. Info in AVS    Labs:  5/1/23 - hep C nonreactive  5/7/25 - CBC, CMP unremarkable    Check:

## 2025-07-16 RX ORDER — TESTOSTERONE CYPIONATE 200 MG/ML
200 INJECTION, SOLUTION INTRAMUSCULAR
Qty: 5 ML | Refills: 1 | Status: SHIPPED | OUTPATIENT
Start: 2025-07-16

## 2025-07-17 ENCOUNTER — LAB (OUTPATIENT)
Dept: LAB | Facility: HOSPITAL | Age: 55
End: 2025-07-17
Payer: COMMERCIAL

## 2025-07-17 ENCOUNTER — APPOINTMENT (OUTPATIENT)
Dept: LAB | Facility: HOSPITAL | Age: 55
End: 2025-07-17
Payer: COMMERCIAL

## 2025-07-17 DIAGNOSIS — L40.9 PSORIASIS, UNSPECIFIED: Primary | ICD-10-CM

## 2025-07-17 LAB
HBV CORE AB SER QL: NONREACTIVE
HBV SURFACE AB SER-ACNC: <3.1 MIU/ML
HBV SURFACE AG SERPL QL IA: NONREACTIVE
HIV 1+2 AB+HIV1 P24 AG SERPL QL IA: NONREACTIVE

## 2025-07-17 PROCEDURE — 86706 HEP B SURFACE ANTIBODY: CPT

## 2025-07-17 PROCEDURE — 87389 HIV-1 AG W/HIV-1&-2 AB AG IA: CPT

## 2025-07-17 PROCEDURE — 87340 HEPATITIS B SURFACE AG IA: CPT

## 2025-07-17 PROCEDURE — 36415 COLL VENOUS BLD VENIPUNCTURE: CPT

## 2025-07-17 PROCEDURE — 86481 TB AG RESPONSE T-CELL SUSP: CPT

## 2025-07-17 PROCEDURE — 86704 HEP B CORE ANTIBODY TOTAL: CPT

## 2025-07-18 LAB
ANION GAP SERPL CALCULATED.4IONS-SCNC: 10 MMOL/L (CALC) (ref 7–17)
BUN SERPL-MCNC: 30 MG/DL (ref 7–25)
BUN/CREAT SERPL: 27 (CALC) (ref 6–22)
CALCIUM SERPL-MCNC: 9.3 MG/DL (ref 8.6–10.3)
CHLORIDE SERPL-SCNC: 104 MMOL/L (ref 98–110)
CO2 SERPL-SCNC: 25 MMOL/L (ref 20–32)
CREAT SERPL-MCNC: 1.11 MG/DL (ref 0.7–1.3)
EGFRCR SERPLBLD CKD-EPI 2021: 78 ML/MIN/1.73M2
ERYTHROCYTE [DISTWIDTH] IN BLOOD BY AUTOMATED COUNT: 12.7 % (ref 11–15)
GLUCOSE SERPL-MCNC: 84 MG/DL (ref 65–139)
HCT VFR BLD AUTO: 47.6 % (ref 38.5–50)
HGB BLD-MCNC: 16.4 G/DL (ref 13.2–17.1)
MCH RBC QN AUTO: 33 PG (ref 27–33)
MCHC RBC AUTO-ENTMCNC: 34.5 G/DL (ref 32–36)
MCV RBC AUTO: 95.8 FL (ref 80–100)
PLATELET # BLD AUTO: 222 THOUSAND/UL (ref 140–400)
PMV BLD REES-ECKER: 9.2 FL (ref 7.5–12.5)
POTASSIUM SERPL-SCNC: 4.1 MMOL/L (ref 3.5–5.3)
RBC # BLD AUTO: 4.97 MILLION/UL (ref 4.2–5.8)
SODIUM SERPL-SCNC: 139 MMOL/L (ref 135–146)
WBC # BLD AUTO: 8 THOUSAND/UL (ref 3.8–10.8)

## 2025-07-19 LAB
NIL(NEG) CONTROL SPOT COUNT: NORMAL
PANEL A SPOT COUNT: 0
PANEL B SPOT COUNT: 0
POS CONTROL SPOT COUNT: NORMAL
T-SPOT. TB INTERPRETATION: NEGATIVE

## 2025-07-25 ENCOUNTER — RESULTS FOLLOW-UP (OUTPATIENT)
Dept: DERMATOLOGY | Facility: CLINIC | Age: 55
End: 2025-07-25
Payer: COMMERCIAL

## 2025-07-25 DIAGNOSIS — L40.9 PSORIASIS: Primary | ICD-10-CM

## 2025-07-25 RX ORDER — RISANKIZUMAB-RZAA 150 MG/ML
150 INJECTION SUBCUTANEOUS
Qty: 1 ML | Refills: 1 | Status: SHIPPED | OUTPATIENT
Start: 2025-07-25 | End: 2025-08-08 | Stop reason: HOSPADM

## 2025-07-25 RX ORDER — RISANKIZUMAB-RZAA 150 MG/ML
150 INJECTION SUBCUTANEOUS
Qty: 2 ML | Refills: 0 | Status: SHIPPED | OUTPATIENT
Start: 2025-07-25 | End: 2025-08-08 | Stop reason: HOSPADM

## 2025-07-28 ENCOUNTER — SPECIALTY PHARMACY (OUTPATIENT)
Dept: PHARMACY | Facility: CLINIC | Age: 55
End: 2025-07-28

## 2025-07-28 ENCOUNTER — HOSPITAL ENCOUNTER (OUTPATIENT)
Dept: RADIOLOGY | Facility: CLINIC | Age: 55
Discharge: HOME | End: 2025-07-28
Payer: COMMERCIAL

## 2025-07-28 DIAGNOSIS — M54.16 LUMBAR RADICULOPATHY: ICD-10-CM

## 2025-07-28 PROCEDURE — 72148 MRI LUMBAR SPINE W/O DYE: CPT | Performed by: RADIOLOGY

## 2025-07-28 PROCEDURE — 72148 MRI LUMBAR SPINE W/O DYE: CPT

## 2025-08-01 ENCOUNTER — PRE-ADMISSION TESTING (OUTPATIENT)
Dept: PREADMISSION TESTING | Facility: HOSPITAL | Age: 55
End: 2025-08-01
Payer: COMMERCIAL

## 2025-08-01 ENCOUNTER — TELEPHONE (OUTPATIENT)
Dept: PRIMARY CARE | Facility: CLINIC | Age: 55
End: 2025-08-01

## 2025-08-01 ENCOUNTER — APPOINTMENT (OUTPATIENT)
Dept: PREADMISSION TESTING | Facility: HOSPITAL | Age: 55
End: 2025-08-01
Payer: COMMERCIAL

## 2025-08-01 VITALS
SYSTOLIC BLOOD PRESSURE: 132 MMHG | HEIGHT: 74 IN | DIASTOLIC BLOOD PRESSURE: 65 MMHG | BODY MASS INDEX: 29.99 KG/M2 | TEMPERATURE: 97.9 F | OXYGEN SATURATION: 97 % | HEART RATE: 60 BPM | WEIGHT: 233.69 LBS | RESPIRATION RATE: 18 BRPM

## 2025-08-01 DIAGNOSIS — Z01.818 PRE-OP TESTING: Primary | ICD-10-CM

## 2025-08-01 LAB
APTT PPP: 33 SECONDS (ref 26–36)
INR PPP: 1.1 (ref 0.9–1.1)
PROTHROMBIN TIME: 11.7 SECONDS (ref 9.8–12.4)

## 2025-08-01 PROCEDURE — 93005 ELECTROCARDIOGRAM TRACING: CPT

## 2025-08-01 PROCEDURE — 36415 COLL VENOUS BLD VENIPUNCTURE: CPT

## 2025-08-01 PROCEDURE — 99204 OFFICE O/P NEW MOD 45 MIN: CPT | Performed by: NURSE PRACTITIONER

## 2025-08-01 PROCEDURE — 85610 PROTHROMBIN TIME: CPT

## 2025-08-01 RX ORDER — IBUPROFEN 200 MG
600 TABLET ORAL EVERY 6 HOURS
COMMUNITY

## 2025-08-01 RX ORDER — ACETAMINOPHEN 500 MG
500 TABLET ORAL EVERY 6 HOURS PRN
COMMUNITY

## 2025-08-01 RX ORDER — VIT C/E/ZN/COPPR/LUTEIN/ZEAXAN 250MG-90MG
50 CAPSULE ORAL DAILY
COMMUNITY

## 2025-08-01 ASSESSMENT — DUKE ACTIVITY SCORE INDEX (DASI)
CAN YOU TAKE CARE OF YOURSELF (EAT, DRESS, BATHE, OR USE TOILET): YES
CAN YOU DO LIGHT WORK AROUND THE HOUSE LIKE DUSTING OR WASHING DISHES: YES
DASI METS SCORE: 9.9
CAN YOU DO MODERATE WORK AROUND THE HOUSE LIKE VACUUMING, SWEEPING FLOORS OR CARRYING GROCERIES: YES
CAN YOU PARTICIPATE IN MODERATE RECREATIONAL ACTIVITIES LIKE GOLF, BOWLING, DANCING, DOUBLES TENNIS OR THROWING A BASEBALL OR FOOTBALL: YES
TOTAL_SCORE: 58.2
CAN YOU DO YARD WORK LIKE RAKING LEAVES, WEEDING OR PUSHING A MOWER: YES
CAN YOU RUN A SHORT DISTANCE: YES
CAN YOU HAVE SEXUAL RELATIONS: YES
CAN YOU PARTICIPATE IN STRENOUS SPORTS LIKE SWIMMING, SINGLES TENNIS, FOOTBALL, BASKETBALL, OR SKIING: YES
CAN YOU CLIMB A FLIGHT OF STAIRS OR WALK UP A HILL: YES
CAN YOU WALK A BLOCK OR TWO ON LEVEL GROUND: YES
CAN YOU WALK INDOORS, SUCH AS AROUND YOUR HOUSE: YES
CAN YOU DO HEAVY WORK AROUND THE HOUSE LIKE SCRUBBING FLOORS OR LIFTING AND MOVING HEAVY FURNITURE: YES

## 2025-08-01 ASSESSMENT — PAIN - FUNCTIONAL ASSESSMENT: PAIN_FUNCTIONAL_ASSESSMENT: 0-10

## 2025-08-01 ASSESSMENT — PAIN SCALES - GENERAL: PAINLEVEL_OUTOF10: 0 - NO PAIN

## 2025-08-01 NOTE — TELEPHONE ENCOUNTER
Pt called the office 8/1/25 stating that he has been experiencing fatigue for 2 months.  Pt doesn't have any other symptoms, but he wanted to know if this could have anything to do with his thyroid? The pt's last ov was 5/2/25.

## 2025-08-01 NOTE — CPM/PAT H&P
"CPM/PAT Evaluation       Name: Eliseo Particio (Eliseo Patricio)  /Age: 1970/55 y.o.     In-Person       Chief Complaint:     55 yr old male presents to [facility] for pre-operative evaluation, with c/o DAVID  SEPTOPLASTY/ SUMUCOSAL RESECTION/  INFERIOR TURBINATE OUTFRACTURE/DRUG INDUCED SLEEP ENDOSCOPY   is Scheduled on 2025 with Dr. Adam    The patient has the following past medical history:  DAVID, hypothyroid, pre-DM, cervical radiculopathy, GERD/Barretts, smoker, hypogonadism      Chief complaint:  Had sleep study done for c/o starting testosterone and was known to snore  He reports dx of DAVID with sleep study done 2025  States he was ordered CPAP---> used 2-3 months then had difficulty tolerating mask d/t congestion and stopped using  C/o increased anxiety    C/0 congestion/rhinitis states,  \"head gets clogged for last 8 + years, L>R, with puffiness\"  States may have allergies, was never tested formally---> was taking allergy medicine PRN  Denies injury to nose or face  Sinus infection notable over the years, not chronic  He reports decreased smell and taste--states he r/t smoking in past, quit 3 years ago  Mouth breather with globus sensation    Denies pain or difficulties with swallowing  Tried in the past sprays, allergy medicine,  nasal strips    PCP Dr. Purvis, LOV 2025    Denies fever, chills or nausea.   Denies any past issues with anesthesia.        Vitals:    25 1357   BP: 132/65   Pulse: 60   Resp: 18   Temp: 36.6 °C (97.9 °F)   SpO2: 97%          Medical History[1]    Surgical History[2]    Patient Sexual activity questions deferred to the physician.    Family History[3]    Allergies[4]    Prior to Admission medications   Medication Sig Start Date End Date Taking? Authorizing Provider   clobetasol (Temovate) 0.05 % ointment Use twice a day, daily, to the hands up to 4 weeks and then decrease to twice a day, 3 times a week 25   My Kristen, DO   cyclobenzaprine " (Flexeril) 10 mg tablet Take 1 tablet (10 mg) by mouth 3 times a day as needed for muscle spasms. 4/8/25 5/8/25  Tomy Aguirre PA-C   fluticasone (Flonase) 50 mcg/actuation nasal spray Administer 2 sprays into each nostril once daily. Shake gently. Before first use, prime pump. After use, clean tip and replace cap. 7/9/25 7/9/26  Kian Garcia MD   levothyroxine (Synthroid, Levoxyl) 175 mcg tablet Take 1 tablet (175 mcg) by mouth early in the morning.. Take on an empty stomach at the same time each day, either 30 to 60 minutes prior to breakfast 5/8/25 11/4/25  Faizan Purvis MD   meloxicam (Mobic) 15 mg tablet Take 1 tablet (15 mg) by mouth once daily. 6/9/25 9/7/25  Xi Gaming PA-C   risankizumab-rzaa (Skyrizi) 150 mg/mL pen injector pen Inject 1 mL (150 mg) under the skin every 28 (twenty-eight) days. At week 0 and week 4. 7/25/25   Christi Rahman MD   risankizumab-rzaa (Skyrizi) 150 mg/mL pen injector pen Inject 1 mL (150 mg) under the skin every 3 months. 7/25/25   Christi Rahman MD   tadalafil 20 mg tablet Take 1 tablet (20 mg) by mouth if needed for erectile dysfunction (Start with half tab. Take 2 hours prior to wanting erection.If you get an erection over 4 hours, go to the emergency room.). 6/30/25   Pedro Westbrook MD   testosterone cypionate (Depo-Testosterone) 200 mg/mL injection Inject 1 mL (200 mg) into the muscle every 14 (fourteen) days. 7/16/25   Faizan Purvis MD   triamcinolone (Kenalog) 0.1 % ointment Use twice a day to affected area on the arms up to 2 weeks. Then take a 1 week break. Repeat as needed. 5/12/25   My Kristen, DO      Review of Systems  Constitutional: NO F, chills, or sweats  Eyes: no blurred vision or visual disturbance  ENT: See HPI  Cardiovascular: no chest pain, no edema, no palps and no syncope.   Respiratory: no cough,no s.o.b. and no wheezing  Gastrointestinal: no abdominal pain, no N/V, no blood in stools  Genitourinary: no dysuria,  "no urinary frequency, no urinary hesitancy and no feelings of urinary urgency.   Musculoskeletal: tore ligament in left elbow- following PT, no arthralgias, no back pain and no myalgias.   Integumentary: psoriasis, , open sores and dryness to the hands and feet, no new skin lesions and no rashes.   Neurological: + numbness to feet, no difficulty walking, no headache, no limb weakness  Endocrine: no recent weight gain and no recent weight loss.   Hematologic/Lymphatic: no tendency for easy bruising and no swollen glands.      Physical Exam  Constitutional:       Appearance: Normal appearance.     Cardiovascular:      Rate and Rhythm: Normal rate.      Heart sounds: Normal heart sounds.   Pulmonary:      Effort: Pulmonary effort is normal.      Breath sounds: Normal breath sounds.   Abdominal:      General: Bowel sounds are normal.      Palpations: Abdomen is soft.     Musculoskeletal:         General: Normal range of motion.      Cervical back: Normal range of motion.      Right lower leg: No edema.      Left lower leg: No edema.     Skin:     General: Skin is warm and dry.     Neurological:      Mental Status: He is alert and oriented to person, place, and time.          PAT AIRWAY:   Airway:     Mallampati::  III    TM distance::  <3 FB    Neck ROM::  Full      Visit Vitals  /65   Pulse 60   Temp 36.6 °C (97.9 °F) (Tympanic)   Resp 18   Ht 1.88 m (6' 2\")   Wt 106 kg (233 lb 11 oz)   SpO2 97%   BMI 30.00 kg/m²   Smoking Status Former   BSA 2.35 m²       DASI Risk Score      Flowsheet Row Pre-Admission Testing from 8/1/2025 in [facility] Questionnaire Series Submission from 7/9/2025 in [facility] OR with Generic Provider Merle   Can you take care of yourself (eat, dress, bathe, or use toilet)?  2.75 filed at 08/01/2025 1342 2.75  filed at 07/09/2025 1837   Can you walk indoors, such as around your house? 1.75 filed at 08/01/2025 1342 1.75  filed at 07/09/2025 1837   Can you walk a " block or two on level ground?  2.75 filed at 08/01/2025 1342 2.75  filed at 07/09/2025 1837   Can you climb a flight of stairs or walk up a hill? 5.5 filed at 08/01/2025 1342 5.5  filed at 07/09/2025 1837   Can you run a short distance? 8 filed at 08/01/2025 1342 8  filed at 07/09/2025 1837   Can you do light work around the house like dusting or washing dishes? 2.7 filed at 08/01/2025 1342 2.7  filed at 07/09/2025 1837   Can you do moderate work around the house like vacuuming, sweeping floors or carrying groceries? 3.5 filed at 08/01/2025 1342 3.5  filed at 07/09/2025 1837   Can you do heavy work around the house like scrubbing floors or lifting and moving heavy furniture?  8 filed at 08/01/2025 1342 8  filed at 07/09/2025 1837   Can you do yard work like raking leaves, weeding or pushing a mower? 4.5 filed at 08/01/2025 1342 4.5  filed at 07/09/2025 1837   Can you have sexual relations? 5.25 filed at 08/01/2025 1342 5.25  filed at 07/09/2025 1837   Can you participate in moderate recreational activities like golf, bowling, dancing, doubles tennis or throwing a baseball or football? 6 filed at 08/01/2025 1342 6  filed at 07/09/2025 1837   Can you participate in strenous sports like swimming, singles tennis, football, basketball, or skiing? 7.5 filed at 08/01/2025 1342 0  filed at 07/09/2025 1837   DASI SCORE 58.2 filed at 08/01/2025 1342 50.7  filed at 07/09/2025 1837   METS Score (Will be calculated only when all the questions are answered) 9.9 filed at 08/01/2025 1342 9  filed at 07/09/2025 1837     Caprini DVT Assessment    No data to display  Modified Frailty Index    No data to display  [id number] Stroke Risk Points  Current as of 54 minutes ago        N/A 0 to 9 Points:      Last Change: N/A          The [id number]-[id number] risk score (Lip ANUJ, et al. 2009. © 2010 [employer]) quantifies the risk of stroke for a patient with atrial fibrillation. For patients without atrial fibrillation or  under the age of 18 this score appears as N/A. Higher score values generally indicate higher risk of stroke.        This score is not applicable to this patient. Components are not calculated.          Revised Cardiac Risk Index    No data to display  Apfel Simplified Score    No data to display  Risk Analysis Index Results This Encounter    No data found in the last 10 encounters.       Stop Bang Score      Flowsheet Row Pre-Admission Testing from 8/1/2025 in [facility]   Do you snore loudly? 1 filed at 08/01/2025 1453   Do you often feel tired or fatigued after your sleep? 1 filed at 08/01/2025 1453   Has anyone ever observed you stop breathing in your sleep? 1 filed at 08/01/2025 1453   Do you have or are you being treated for high blood pressure? 1 filed at 08/01/2025 1453   Recent BMI (Calculated) 30 filed at 08/01/2025 1453   Is BMI greater than 35 kg/m2? 0=No filed at 08/01/2025 1453   Age older than 50 years old? 1=Yes filed at 08/01/2025 1453   Is your neck circumference greater than 17 inches (Male) or 16 inches (Female)? 0 filed at 08/01/2025 1453   Gender - Male 1=Yes filed at 08/01/2025 1453   STOP-BANG Total Score 6 filed at 08/01/2025 1453     Prodigy: High Risk  Total Score: 8              Prodigy Gender Score          ARISCAT Score for Postoperative Pulmonary Complications    No data to display  Lezama Perioperative Risk for Myocardial Infarction or Cardiac Arrest (ANGELICA)    No data to display      ASSESSMENT  Obesity   BMI 30.00    Hypothyroid  Takes levothyroxine  TSH 7/10/2025- 3.03    pre-DM  Diet managed  A1c 5/7/2025- 5.8    DAVID, smoker  HST 2/13/2025--> severe DAVID, AHI 3% 33, AHI 4% 23.1, SpO2 hans 63.9%.   Stopped using CPAP --> unable to tolerate  Plan for septoplasty/ inferior turbinate outfracture as scheduled     ANESTHESIA FINDINGS:  Intubation History: No history of difficult intubation  Significant Anesthesia Considerations:      Airway History: No abnormal airway  history  Predictors of Difficult Airway Management  ? severe DAVID- not using CPAP  ? Obesity    CONSULTS:    Patient does not require consults for optimization at this time.     The Following Tests/Procedures Have Been Initiated and Reviewed/ interpreted by me:   Coag screen, ECG  CBC, BMP reviewed from 7/26/2025    Planned Anesthetic: general     Instructions Given to Patient:  *Reviewed Medications to be taken in AM of surgery or held  Patient given verbal and written preop instructions and voices comprehension and compliance.     No further testing required.      PLAN  This patient is optimally prepared for surgery.           [1]   Past Medical History:  Diagnosis Date    Anxiety     Arthritis 10 year’s ago    CPAP (continuous positive airway pressure) dependence 01/01/2025    Esophageal disease About 6 years ago    Villegas's esophagus    GERD (gastroesophageal reflux disease)     Hypothyroidism Not sure 6 years ago    Joint pain     PONV (postoperative nausea and vomiting)     Psoriasis     Skin disorder     Sleep apnea     Spinal stenosis 10 years ago   [2]   Past Surgical History:  Procedure Laterality Date    COLONOSCOPY  2024    NO PAST SURGERIES      OTHER SURGICAL HISTORY  12/12/2019    [place] tooth extraction    OTHER SURGICAL HISTORY  12/12/2019    Tonsillectomy    TONSILLECTOMY      UPPER GASTROINTESTINAL ENDOSCOPY  2024   [3]   Family History  Problem Relation Name Age of Onset    COPD Mother  70 - 79    Sleep apnea Father      Hypertension Father  50 - 59    Stroke Father  70 - 79    Heart disease Father  60 - 69    Cancer Sister  60 - 69    Lupus Sister     [4]   Allergies  Allergen Reactions    Penicillins Hives

## 2025-08-01 NOTE — PREPROCEDURE INSTRUCTIONS
Medication List            Accurate as of August 1, 2025  2:21 PM. Always use your most recent med list.                acetaminophen 500 mg tablet  Commonly known as: Tylenol  Medication Adjustments for Surgery: Take/Use as prescribed     cholecalciferol 25 mcg (1,000 units) capsule  Commonly known as: Vitamin D-3  Additional Medication Adjustments for Surgery: Take last dose 7 days before surgery  Notes to patient: Stop now     clobetasol 0.05 % ointment  Commonly known as: Temovate  Use twice a day, daily, to the hands up to 4 weeks and then decrease to twice a day, 3 times a week  Medication Adjustments for Surgery: Do Not take on the morning of surgery     creatine monohydrate powder  Additional Medication Adjustments for Surgery: Take last dose 7 days before surgery  Notes to patient: Stop now     cyclobenzaprine 10 mg tablet  Commonly known as: Flexeril  Take 1 tablet (10 mg) by mouth 3 times a day as needed for muscle spasms.  Medication Adjustments for Surgery: Take/Use as prescribed     fluticasone 50 mcg/actuation nasal spray  Commonly known as: Flonase  Administer 2 sprays into each nostril once daily. Shake gently. Before first use, prime pump. After use, clean tip and replace cap.  Medication Adjustments for Surgery: Take/Use as prescribed     ibuprofen 200 mg tablet  Additional Medication Adjustments for Surgery: Take last dose 7 days before surgery  Notes to patient: Stop now     levothyroxine 175 mcg tablet  Commonly known as: Synthroid, Levoxyl  Take 1 tablet (175 mcg) by mouth early in the morning.. Take on an empty stomach at the same time each day, either 30 to 60 minutes prior to breakfast  Medication Adjustments for Surgery: Take on the morning of surgery     meloxicam 15 mg tablet  Commonly known as: Mobic  Take 1 tablet (15 mg) by mouth once daily.  Additional Medication Adjustments for Surgery: Take last dose 7 days before surgery  Notes to patient: Stop now     multivitamin with  minerals iron-free  Commonly known as: Centrum Silver  Additional Medication Adjustments for Surgery: Take last dose 7 days before surgery  Notes to patient: Stop now     * Skyrizi 150 mg/mL pen injector pen  Generic drug: risankizumab-rzaa  Inject 1 mL (150 mg) under the skin every 28 (twenty-eight) days. At week 0 and week 4.  Medication Adjustments for Surgery: Take/Use as prescribed  Notes to patient: Last injection  Due     * Skyrizi 150 mg/mL pen injector pen  Generic drug: risankizumab-rzaa  Inject 1 mL (150 mg) under the skin every 3 months.  Medication Adjustments for Surgery: Take/Use as prescribed     tadalafil 20 mg tablet  Take 1 tablet (20 mg) by mouth if needed for erectile dysfunction (Start with half tab. Take 2 hours prior to wanting erection.If you get an erection over 4 hours, go to the emergency room.).  Medication Adjustments for Surgery: Take last dose 3 days before surgery  Notes to patient: Stop now     testosterone cypionate 200 mg/mL injection  Commonly known as: Depo-Testosterone  Inject 1 mL (200 mg) into the muscle every 14 (fourteen) days.  Medication Adjustments for Surgery: Take last dose 3 days before surgery  Notes to patient: Stop now     triamcinolone 0.1 % ointment  Commonly known as: Kenalog  Use twice a day to affected area on the arms up to 2 weeks. Then take a 1 week break. Repeat as needed.  Medication Adjustments for Surgery: Do Not take on the morning of surgery     VITAMIN B COMPLEX NO.12-NIACIN ORAL  Additional Medication Adjustments for Surgery: Take last dose 7 days before surgery  Notes to patient: Stop now           * This list has 2 medication(s) that are the same as other medications prescribed for you. Read the directions carefully, and ask your doctor or other care provider to review them with you.                  PRE-OPERATIVE INSTRUCTIONS FOR SURGERY    *Do not eat anything after midnight the night of surgery.  This includes food of any kind (including hard  candy, cough drops, mints).     You may have up to 13 ounces of clear liquid until TWO hours prior to your scheduled arrival time  Clear liquids include water, black tea/coffee, (no milk or cream) apple juice and electrolyte drinks (GATORADE).  You may chew gum until TWO hours prior you your surgery/procedure.         -----------    *One of our staff members will call you ONE business day before your surgery, between 11am-2 pm to let you know the time to arrive.    If you have not received a call by 2 pm, call [phone]    *When you arrive at the hospital-->GO TO Registration on the ground floor    *Stop smoking 24 hours prior to surgery.  No Marijuana, CBD Oil or Vaping for 48 hours    *No alcohol 24 hours prior to surgery    *You will need a responsible adult to drive you home      -You may be asked to remove your dentures, partial plate, eyeglasses or contact lenses before going to surgery.  Please bring a case for these items.    -Body piercings need to be removed.  Jewelry and valuables should be left at home.    -Put on loose,  comfortable, clean clothing, that will accommodate bandages    *If you have any further questions about your pre-op instructions,  not mentioned in this handout, then call [phone]*    What you may be asked to bring to surgery:  Insurance info and photo ID                             NPO Instructions:    Do not eat any food after midnight the night before your surgery/procedure.  You may have clear liquids until TWO hours before surgery/procedure. This includes water, black tea/coffee, (no milk or cream) apple juice and electrolyte drinks (Gatorade).    Additional Instructions:     Day of Surgery:  Review your medication instructions, take indicated medications  You may have clear liquids until TWO hours before surgery/procedure.  This includes water, black tea/coffee, (no milk or cream) apple juice and electrolyte drinks (Gatorade)  Wear  comfortable loose fitting clothing  Do  not use moisturizers, creams, lotions or perfume  All jewelry and valuables should be left at home

## 2025-08-06 LAB
ATRIAL RATE: 58 BPM
P AXIS: 61 DEGREES
P OFFSET: 189 MS
P ONSET: 131 MS
PR INTERVAL: 162 MS
Q ONSET: 212 MS
QRS COUNT: 10 BEATS
QRS DURATION: 88 MS
QT INTERVAL: 404 MS
QTC CALCULATION(BAZETT): 396 MS
QTC FREDERICIA: 399 MS
R AXIS: -37 DEGREES
T AXIS: 14 DEGREES
T OFFSET: 414 MS
VENTRICULAR RATE: 58 BPM

## 2025-08-08 ENCOUNTER — HOSPITAL ENCOUNTER (OUTPATIENT)
Facility: HOSPITAL | Age: 55
Setting detail: OUTPATIENT SURGERY
Discharge: HOME | End: 2025-08-08
Payer: COMMERCIAL

## 2025-08-08 ENCOUNTER — PHARMACY VISIT (OUTPATIENT)
Dept: PHARMACY | Facility: CLINIC | Age: 55
End: 2025-08-08
Payer: COMMERCIAL

## 2025-08-08 ENCOUNTER — ANESTHESIA EVENT (OUTPATIENT)
Dept: OPERATING ROOM | Facility: HOSPITAL | Age: 55
End: 2025-08-08
Payer: COMMERCIAL

## 2025-08-08 ENCOUNTER — ANESTHESIA (OUTPATIENT)
Dept: OPERATING ROOM | Facility: HOSPITAL | Age: 55
End: 2025-08-08
Payer: COMMERCIAL

## 2025-08-08 VITALS
TEMPERATURE: 97 F | OXYGEN SATURATION: 98 % | SYSTOLIC BLOOD PRESSURE: 144 MMHG | RESPIRATION RATE: 18 BRPM | HEART RATE: 64 BPM | DIASTOLIC BLOOD PRESSURE: 85 MMHG

## 2025-08-08 DIAGNOSIS — G47.33 OSA (OBSTRUCTIVE SLEEP APNEA): Primary | ICD-10-CM

## 2025-08-08 DIAGNOSIS — L40.9 PSORIASIS: ICD-10-CM

## 2025-08-08 PROCEDURE — 7100000010 HC PHASE TWO TIME - EACH INCREMENTAL 1 MINUTE

## 2025-08-08 PROCEDURE — 30520 REPAIR OF NASAL SEPTUM: CPT

## 2025-08-08 PROCEDURE — 2500000001 HC RX 250 WO HCPCS SELF ADMINISTERED DRUGS (ALT 637 FOR MEDICARE OP)

## 2025-08-08 PROCEDURE — 42975 DISE EVAL SLP DO BRTH FLX DX: CPT

## 2025-08-08 PROCEDURE — 3600000007 HC OR TIME - EACH INCREMENTAL 1 MINUTE - PROCEDURE LEVEL TWO

## 2025-08-08 PROCEDURE — A42975: Performed by: ANESTHESIOLOGIST ASSISTANT

## 2025-08-08 PROCEDURE — 2500000004 HC RX 250 GENERAL PHARMACY W/ HCPCS (ALT 636 FOR OP/ED): Performed by: ANESTHESIOLOGIST ASSISTANT

## 2025-08-08 PROCEDURE — 2720000007 HC OR 272 NO HCPCS

## 2025-08-08 PROCEDURE — A42975: Performed by: ANESTHESIOLOGY

## 2025-08-08 PROCEDURE — 2500000004 HC RX 250 GENERAL PHARMACY W/ HCPCS (ALT 636 FOR OP/ED)

## 2025-08-08 PROCEDURE — 7100000009 HC PHASE TWO TIME - INITIAL BASE CHARGE

## 2025-08-08 PROCEDURE — 2500000004 HC RX 250 GENERAL PHARMACY W/ HCPCS (ALT 636 FOR OP/ED): Performed by: ANESTHESIOLOGY

## 2025-08-08 PROCEDURE — 7100000001 HC RECOVERY ROOM TIME - INITIAL BASE CHARGE

## 2025-08-08 PROCEDURE — 30140 RESECT INFERIOR TURBINATE: CPT

## 2025-08-08 PROCEDURE — 7100000002 HC RECOVERY ROOM TIME - EACH INCREMENTAL 1 MINUTE

## 2025-08-08 PROCEDURE — 3600000002 HC OR TIME - INITIAL BASE CHARGE - PROCEDURE LEVEL TWO

## 2025-08-08 PROCEDURE — 3700000002 HC GENERAL ANESTHESIA TIME - EACH INCREMENTAL 1 MINUTE

## 2025-08-08 PROCEDURE — RXMED WILLOW AMBULATORY MEDICATION CHARGE

## 2025-08-08 PROCEDURE — 3700000001 HC GENERAL ANESTHESIA TIME - INITIAL BASE CHARGE

## 2025-08-08 RX ORDER — CEFAZOLIN 1 G/1
INJECTION, POWDER, FOR SOLUTION INTRAVENOUS AS NEEDED
Status: DISCONTINUED | OUTPATIENT
Start: 2025-08-08 | End: 2025-08-08

## 2025-08-08 RX ORDER — FAMOTIDINE 10 MG/ML
20 INJECTION, SOLUTION INTRAVENOUS ONCE
Status: COMPLETED | OUTPATIENT
Start: 2025-08-08 | End: 2025-08-08

## 2025-08-08 RX ORDER — LIDOCAINE HYDROCHLORIDE AND EPINEPHRINE 10; 10 UG/ML; MG/ML
INJECTION, SOLUTION INFILTRATION; PERINEURAL AS NEEDED
Status: DISCONTINUED | OUTPATIENT
Start: 2025-08-08 | End: 2025-08-08 | Stop reason: HOSPADM

## 2025-08-08 RX ORDER — SODIUM CHLORIDE, SODIUM LACTATE, POTASSIUM CHLORIDE, CALCIUM CHLORIDE 600; 310; 30; 20 MG/100ML; MG/100ML; MG/100ML; MG/100ML
INJECTION, SOLUTION INTRAVENOUS CONTINUOUS PRN
Status: DISCONTINUED | OUTPATIENT
Start: 2025-08-08 | End: 2025-08-08

## 2025-08-08 RX ORDER — MIDAZOLAM HYDROCHLORIDE 1 MG/ML
INJECTION, SOLUTION INTRAMUSCULAR; INTRAVENOUS AS NEEDED
Status: DISCONTINUED | OUTPATIENT
Start: 2025-08-08 | End: 2025-08-08

## 2025-08-08 RX ORDER — FENTANYL CITRATE 50 UG/ML
INJECTION, SOLUTION INTRAMUSCULAR; INTRAVENOUS AS NEEDED
Status: DISCONTINUED | OUTPATIENT
Start: 2025-08-08 | End: 2025-08-08

## 2025-08-08 RX ORDER — OXYMETAZOLINE HCL 0.05 %
SPRAY, NON-AEROSOL (ML) NASAL AS NEEDED
Status: DISCONTINUED | OUTPATIENT
Start: 2025-08-08 | End: 2025-08-08 | Stop reason: HOSPADM

## 2025-08-08 RX ORDER — ACETAMINOPHEN 325 MG/1
650 TABLET ORAL EVERY 4 HOURS PRN
Status: DISCONTINUED | OUTPATIENT
Start: 2025-08-08 | End: 2025-08-08 | Stop reason: HOSPADM

## 2025-08-08 RX ORDER — OXYMETAZOLINE HCL 0.05 %
2 SPRAY, NON-AEROSOL (ML) NASAL ONCE
Status: DISCONTINUED | OUTPATIENT
Start: 2025-08-08 | End: 2025-08-08 | Stop reason: HOSPADM

## 2025-08-08 RX ORDER — DIPHENHYDRAMINE HYDROCHLORIDE 50 MG/ML
12.5 INJECTION, SOLUTION INTRAMUSCULAR; INTRAVENOUS ONCE AS NEEDED
Status: DISCONTINUED | OUTPATIENT
Start: 2025-08-08 | End: 2025-08-08 | Stop reason: HOSPADM

## 2025-08-08 RX ORDER — FAMOTIDINE 10 MG/ML
20 INJECTION, SOLUTION INTRAVENOUS AS NEEDED
Status: DISCONTINUED | OUTPATIENT
Start: 2025-08-08 | End: 2025-08-08 | Stop reason: HOSPADM

## 2025-08-08 RX ORDER — LIDOCAINE HCL/PF 100 MG/5ML
SYRINGE (ML) INTRAVENOUS AS NEEDED
Status: DISCONTINUED | OUTPATIENT
Start: 2025-08-08 | End: 2025-08-08

## 2025-08-08 RX ORDER — OXYCODONE HYDROCHLORIDE 5 MG/1
5 TABLET ORAL EVERY 6 HOURS PRN
Qty: 15 TABLET | Refills: 0 | Status: SHIPPED | OUTPATIENT
Start: 2025-08-08 | End: 2025-08-12

## 2025-08-08 RX ORDER — HYDROMORPHONE HYDROCHLORIDE 1 MG/ML
1 INJECTION, SOLUTION INTRAMUSCULAR; INTRAVENOUS; SUBCUTANEOUS EVERY 5 MIN PRN
Status: DISCONTINUED | OUTPATIENT
Start: 2025-08-08 | End: 2025-08-08 | Stop reason: HOSPADM

## 2025-08-08 RX ORDER — OXYMETAZOLINE HCL 0.05 %
2 SPRAY, NON-AEROSOL (ML) NASAL EVERY 12 HOURS PRN
Qty: 30 ML | Refills: 0 | Status: SHIPPED | OUTPATIENT
Start: 2025-08-08

## 2025-08-08 RX ORDER — ONDANSETRON HYDROCHLORIDE 2 MG/ML
4 INJECTION, SOLUTION INTRAVENOUS ONCE AS NEEDED
Status: DISCONTINUED | OUTPATIENT
Start: 2025-08-08 | End: 2025-08-08 | Stop reason: HOSPADM

## 2025-08-08 RX ORDER — LABETALOL HYDROCHLORIDE 5 MG/ML
10 INJECTION, SOLUTION INTRAVENOUS ONCE AS NEEDED
Status: DISCONTINUED | OUTPATIENT
Start: 2025-08-08 | End: 2025-08-08 | Stop reason: HOSPADM

## 2025-08-08 RX ORDER — PROPOFOL 10 MG/ML
INJECTION, EMULSION INTRAVENOUS AS NEEDED
Status: DISCONTINUED | OUTPATIENT
Start: 2025-08-08 | End: 2025-08-08

## 2025-08-08 RX ORDER — SODIUM CHLORIDE, SODIUM LACTATE, POTASSIUM CHLORIDE, CALCIUM CHLORIDE 600; 310; 30; 20 MG/100ML; MG/100ML; MG/100ML; MG/100ML
100 INJECTION, SOLUTION INTRAVENOUS CONTINUOUS
Status: DISCONTINUED | OUTPATIENT
Start: 2025-08-08 | End: 2025-08-08 | Stop reason: HOSPADM

## 2025-08-08 RX ORDER — ROCURONIUM BROMIDE 10 MG/ML
INJECTION, SOLUTION INTRAVENOUS AS NEEDED
Status: DISCONTINUED | OUTPATIENT
Start: 2025-08-08 | End: 2025-08-08

## 2025-08-08 RX ORDER — HYDRALAZINE HYDROCHLORIDE 20 MG/ML
10 INJECTION INTRAMUSCULAR; INTRAVENOUS EVERY 30 MIN PRN
Status: DISCONTINUED | OUTPATIENT
Start: 2025-08-08 | End: 2025-08-08 | Stop reason: HOSPADM

## 2025-08-08 RX ORDER — ONDANSETRON HYDROCHLORIDE 2 MG/ML
INJECTION, SOLUTION INTRAVENOUS AS NEEDED
Status: DISCONTINUED | OUTPATIENT
Start: 2025-08-08 | End: 2025-08-08

## 2025-08-08 RX ORDER — FAMOTIDINE 20 MG/1
20 TABLET, FILM COATED ORAL ONCE
Status: COMPLETED | OUTPATIENT
Start: 2025-08-08 | End: 2025-08-08

## 2025-08-08 RX ADMIN — FAMOTIDINE 20 MG: 10 INJECTION, SOLUTION INTRAVENOUS at 11:02

## 2025-08-08 RX ADMIN — PROPOFOL 30 MG: 10 INJECTION, EMULSION INTRAVENOUS at 11:53

## 2025-08-08 RX ADMIN — FENTANYL CITRATE 100 MCG: 50 INJECTION, SOLUTION INTRAMUSCULAR; INTRAVENOUS at 12:04

## 2025-08-08 RX ADMIN — SODIUM CHLORIDE, SODIUM LACTATE, POTASSIUM CHLORIDE, AND CALCIUM CHLORIDE 100 ML/HR: .6; .31; .03; .02 INJECTION, SOLUTION INTRAVENOUS at 13:32

## 2025-08-08 RX ADMIN — HYDROMORPHONE HYDROCHLORIDE 0.5 MG: 1 INJECTION, SOLUTION INTRAMUSCULAR; INTRAVENOUS; SUBCUTANEOUS at 13:28

## 2025-08-08 RX ADMIN — FENTANYL CITRATE 50 MCG: 50 INJECTION, SOLUTION INTRAMUSCULAR; INTRAVENOUS at 12:50

## 2025-08-08 RX ADMIN — PROPOFOL 150 MCG/KG/MIN: 10 INJECTION, EMULSION INTRAVENOUS at 11:54

## 2025-08-08 RX ADMIN — ONDANSETRON 4 MG: 2 INJECTION INTRAMUSCULAR; INTRAVENOUS at 12:43

## 2025-08-08 RX ADMIN — ROCURONIUM BROMIDE 50 MG: 50 INJECTION, SOLUTION INTRAVENOUS at 12:04

## 2025-08-08 RX ADMIN — LIDOCAINE HYDROCHLORIDE 50 MG: 20 INJECTION, SOLUTION INTRAVENOUS at 11:53

## 2025-08-08 RX ADMIN — DEXAMETHASONE SODIUM PHOSPHATE 4 MG: 4 INJECTION, SOLUTION INTRAMUSCULAR; INTRAVENOUS at 12:15

## 2025-08-08 RX ADMIN — HYDROMORPHONE HYDROCHLORIDE 0.5 MG: 1 INJECTION, SOLUTION INTRAMUSCULAR; INTRAVENOUS; SUBCUTANEOUS at 13:35

## 2025-08-08 RX ADMIN — PROPOFOL 200 MG: 10 INJECTION, EMULSION INTRAVENOUS at 12:04

## 2025-08-08 RX ADMIN — CEFAZOLIN 2 G: 1 INJECTION, POWDER, FOR SOLUTION INTRAMUSCULAR; INTRAVENOUS at 11:55

## 2025-08-08 RX ADMIN — MIDAZOLAM 2 MG: 1 INJECTION INTRAMUSCULAR; INTRAVENOUS at 12:04

## 2025-08-08 RX ADMIN — HYDROMORPHONE HYDROCHLORIDE 0.5 MG: 1 INJECTION, SOLUTION INTRAMUSCULAR; INTRAVENOUS; SUBCUTANEOUS at 13:40

## 2025-08-08 RX ADMIN — SUGAMMADEX 200 MG: 100 INJECTION, SOLUTION INTRAVENOUS at 12:45

## 2025-08-08 RX ADMIN — SODIUM CHLORIDE, POTASSIUM CHLORIDE, SODIUM LACTATE AND CALCIUM CHLORIDE: 600; 310; 30; 20 INJECTION, SOLUTION INTRAVENOUS at 11:47

## 2025-08-08 SDOH — HEALTH STABILITY: MENTAL HEALTH: CURRENT SMOKER: 0

## 2025-08-08 ASSESSMENT — PAIN SCALES - GENERAL
PAINLEVEL_OUTOF10: 6
PAINLEVEL_OUTOF10: 4
PAINLEVEL_OUTOF10: 0 - NO PAIN
PAINLEVEL_OUTOF10: 5 - MODERATE PAIN
PAIN_LEVEL: 0
PAINLEVEL_OUTOF10: 0 - NO PAIN
PAINLEVEL_OUTOF10: 4
PAINLEVEL_OUTOF10: 0 - NO PAIN
PAINLEVEL_OUTOF10: 6
PAINLEVEL_OUTOF10: 6
PAINLEVEL_OUTOF10: 5 - MODERATE PAIN

## 2025-08-08 ASSESSMENT — COLUMBIA-SUICIDE SEVERITY RATING SCALE - C-SSRS
2. HAVE YOU ACTUALLY HAD ANY THOUGHTS OF KILLING YOURSELF?: NO
1. IN THE PAST MONTH, HAVE YOU WISHED YOU WERE DEAD OR WISHED YOU COULD GO TO SLEEP AND NOT WAKE UP?: NO
6. HAVE YOU EVER DONE ANYTHING, STARTED TO DO ANYTHING, OR PREPARED TO DO ANYTHING TO END YOUR LIFE?: NO

## 2025-08-08 ASSESSMENT — ENCOUNTER SYMPTOMS
RESPIRATORY NEGATIVE: 1
EYES NEGATIVE: 1
CARDIOVASCULAR NEGATIVE: 1
CONSTITUTIONAL NEGATIVE: 1

## 2025-08-08 ASSESSMENT — PAIN - FUNCTIONAL ASSESSMENT
PAIN_FUNCTIONAL_ASSESSMENT: 0-10

## 2025-08-08 ASSESSMENT — PAIN DESCRIPTION - LOCATION
LOCATION: HEAD

## 2025-08-08 ASSESSMENT — PAIN DESCRIPTION - DESCRIPTORS
DESCRIPTORS: THROBBING

## 2025-08-08 NOTE — ANESTHESIA PROCEDURE NOTES
Airway  Date/Time: 8/8/2025 12:07 PM  Reason: elective    Airway not difficult    Staffing  Performed: JULIANO   Authorized by: Mesfin Hammonds MD    Performed by: JULIANO Green  Patient location during procedure: OR    Patient Condition  Indications for airway management: anesthesia and airway protection  Patient position: sniffing  MILS maintained throughout  Planned trial extubation  Sedation level: deep     Final Airway Details   Preoxygenated: yes  Final airway type: endotracheal airway  Successful airway: ETT  Cuffed: yes   Successful intubation technique: video laryngoscopy  Adjuncts used in placement: intubating stylet  Endotracheal tube insertion site: oral  Blade: Hakeem  Blade size: #4  ETT size (mm): 7.5  Cormack-Lehane Classification: grade I - full view of glottis  Placement verified by: capnometry   Measured from: lips  ETT to lips (cm): 21  Number of attempts at approach: 1  Number of other approaches attempted: 0

## 2025-08-08 NOTE — SIGNIFICANT EVENT
Discharge instructions reviewed and provided to patient and patients family member. Patient denies questions related to material reviewed. Meds to beds did come to PACU, delivered x3 medications, medications provided to patients family member. Surgical site clean and intact, mustache dressing in place. Additional supplies provided to patients family member. Per patient, pain controlled and denies nausea. Patient tolerates PO well. Patient denies any needs at this time.

## 2025-08-08 NOTE — H&P
History Of Present Illness  Eliseo Patricio is a 55 y.o. male presenting with Nasal Obstruction.     Past Medical History  He has a past medical history of Anxiety, Arthritis (10 year’s ago), CPAP (continuous positive airway pressure) dependence (01/01/2025), Esophageal disease (About 6 years ago), GERD (gastroesophageal reflux disease), Hypothyroidism (Not sure 6 years ago), Joint pain, PONV (postoperative nausea and vomiting), Psoriasis, Skin disorder, Sleep apnea, and Spinal stenosis (10 years ago).    Surgical History  He has a past surgical history that includes Other surgical history (12/12/2019); Other surgical history (12/12/2019); No past surgeries; Colonoscopy (2024); Upper gastrointestinal endoscopy (2024); and Tonsillectomy.     Social History  He reports that he quit smoking about 2 years ago. His smoking use included cigarettes. He started smoking about 38 years ago. He has a 18 pack-year smoking history. He has never been exposed to tobacco smoke. He has never used smokeless tobacco. He reports that he does not currently use alcohol. He reports that he does not currently use drugs.    Family History  Family History[1]     Allergies  Penicillins    Review of Systems   Constitutional: Negative.    HENT: Negative.     Eyes: Negative.    Respiratory: Negative.     Cardiovascular: Negative.         Physical Exam  HENT:      Head: Normocephalic.      Mouth/Throat:      Mouth: Mucous membranes are moist.     Eyes:      Pupils: Pupils are equal, round, and reactive to light.       Cardiovascular:      Rate and Rhythm: Normal rate.     Musculoskeletal:      Cervical back: Normal range of motion.     Neurological:      Mental Status: He is alert.          Last Recorded Vitals  There were no vitals taken for this visit.         Relevant Results        Scheduled medications  Scheduled Medications[2]  Continuous medications  Continuous Medications[3]  PRN medications  PRN Medications[4]          Assessment/Plan   Assessment & Plan  DAVID (obstructive sleep apnea)      Proceed with Septoplasty and Turbinate Reduction as planned.        I spent 15 minutes in the professional and overall care of this patient.      Kian Garcia MD         [1]   Family History  Problem Relation Name Age of Onset    COPD Mother  70 - 79    Sleep apnea Father      Hypertension Father  50 - 59    Stroke Father  70 - 79    Heart disease Father  60 - 69    Cancer Sister  60 - 69    Lupus Sister     [2] [3] [4]

## 2025-08-08 NOTE — ANESTHESIA PREPROCEDURE EVALUATION
Patient: Eliseo Patricio    Procedure Information       Date/Time: 25 1130    Procedures:       SEPTOPLASTY/ SUMUCOSAL RESECTION/ INFERIOR TURBINATE OUTFRACTURE - DISE Procedure first (MAC) then Septoplasty submucosal resection and turbinate outfracture, procedure length should be 1 hour       precert department please send: Dr. Duke note 2025, sleep study 2025, Compliance report 2025      DRUG INDUCED SLEEP ENDOSCOPY    Location: [facility] / [facility]    Surgeons: Kian Garcia MD            Relevant Problems   Anesthesia (within normal limits)      Cardiac   (+) Anterior pleuritic pain   (+) Chest pain      Pulmonary   (+) DAVID (obstructive sleep apnea)      Neuro   (+) Carpal tunnel syndrome of right wrist   (+) Cervical radiculopathy      GI   (+) GERD (gastroesophageal reflux disease)   (+) Rectal hemorrhage      Endocrine   (+) Primary hypothyroidism      Musculoskeletal   (+) Carpal tunnel syndrome of right wrist   (+) Chronic right-sided low back pain without sciatica   (+) Fibromyalgia       Clinical information reviewed:    Allergies  Meds               NPO Detail:  NPO/Void Status  Date of Last Liquid: 25  Time of Last Liquid: 0600  Date of Last Solid: 25  Time of Last Solid: 1800         Physical Exam    Airway  Mallampati: III  TM distance: >3 FB  Neck ROM: full  Mouth openin finger widths     Cardiovascular - normal exam  Rhythm: regular  Rate: normal     Dental - normal exam     Pulmonary - normal examBreath sounds clear to auscultation     Abdominal            Anesthesia Plan    History of general anesthesia?: no  History of complications of general anesthesia?: no    ASA 3     general     The patient is not a current smoker.  Education provided regarding risk of obstructive sleep apnea.  intravenous induction   Postoperative pain plan includes opioids.  Trial extubation is planned.  Anesthetic plan and risks discussed with patient.  Use of  blood products discussed with patient who.    Plan discussed with CAA.

## 2025-08-08 NOTE — OP NOTE
SEPTOPLASTY/ SUMUCOSAL RESECTION/ INFERIOR TURBINATE OUTFRACTURE, DRUG INDUCED SLEEP ENDOSCOPY Operative Note     Date: 2025  OR Location: [facility]    Name: Eliseo Patricio, : 1970, Age: 55 y.o., MRN: [id number], Sex: male    Diagnosis  Pre-op Diagnosis      * DAVID (obstructive sleep apnea) [G47.33] Post-op Diagnosis     * DAVID (obstructive sleep apnea) [G47.33]     Procedures  SEPTOPLASTY/ SUMUCOSAL RESECTION/ INFERIOR TURBINATE OUTFRACTURE  79673 - [place] SEPTOPLASTY/SUBMUCOUS RESECJ W/WO CARTILAGE GRF    SEPTOPLASTY/ SUMUCOSAL RESECTION/ INFERIOR TURBINATE OUTFRACTURE  13340 - [place] FRACTURE NASAL INFERIOR TURBINATE THERAPEUTIC    SEPTOPLASTY/ SUMUCOSAL RESECTION/ INFERIOR TURBINATE OUTFRACTURE  69109 - [place] SUBMUCOUS RESCJ INFERIOR TURBINATE PRTL/COMPL    SEPTOPLASTY/ SUMUCOSAL RESECTION/ INFERIOR TURBINATE OUTFRACTURE  67772 - [place] DISE DYN EVAL SLEEP DISORDERED BREATHING FLX DX    DRUG INDUCED SLEEP ENDOSCOPY  76059 - [place] DISE DYN EVAL SLEEP DISORDERED BREATHING FLX DX      Surgeons      * Kian Garcia - Primary    Resident/Fellow/Other Assistant:  Surgeons and Role:  * No surgeons found with a matching role *    Staff:   Circulator: Cathie Rogers Person: Bladimir Cristobal Circulator: Jose Guadalupe    Anesthesia Staff: Anesthesiologist: Mesfin Hammonds MD  C-AA: JULIANO Green    Procedure Summary  Anesthesia: General  ASA: III  Estimated Blood Loss: 20 mL  Intra-op Medications:   Administrations occurring from 1130 to 1300 on 25:   Medication Name Total Dose   oxymetazoline (Afrin) 0.05 % nasal spray 2 spray   ceFAZolin (Ancef) vial 1 g 2 g   dexAMETHasone (Decadron) 4 mg/mL IV Syringe 2 mL 4 mg   fentaNYL (Sublimaze) injection 50 mcg/mL 100 mcg   LR infusion Cannot be calculated   lidocaine (cardiac) injection 2% prefilled syringe 50 mg   midazolam (Versed) injection 1 mg/mL 2 mg   ondansetron (Zofran) 2 mg/mL injection 4 mg   propofol (Diprivan) injection 10 mg/mL 373.1 mg   rocuronium (ZeMuron)  50 mg/5 mL injection 50 mg   sugammadex (Bridion) 200 mg/2 mL injection 200 mg              Anesthesia Record               Intraprocedure I/O Totals       None           Specimen: No specimens collected              Drains and/or Catheters: * None in log *    Tourniquet Times:         Implants:     Findings: Multilevel Airway Collapse    Indications: Eliseo Patricio is an 55 y.o. male who is having surgery for DAVID (obstructive sleep apnea) [G47.33].     The patient was seen in the preoperative area. The risks, benefits, complications, treatment options, non-operative alternatives, expected recovery and outcomes were discussed with the patient. The possibilities of reaction to medication, pulmonary aspiration, injury to surrounding structures, bleeding, recurrent infection, the need for additional procedures, failure to diagnose a condition, and creating a complication requiring transfusion or operation were discussed with the patient. The patient concurred with the proposed plan, giving informed consent.  The site of surgery was properly noted/marked if necessary per policy. The patient has been actively warmed in preoperative area. Preoperative antibiotics have been ordered and given within 1 hours of incision. Venous thrombosis prophylaxis have been ordered including bilateral sequential compression devices    Procedure Details:   Description of Procedure:      Patient was taken to the operating room 05 by the anesthesia service, and remained in supine position. Surgical time out was performed. Oxymetazoline was used to decongest nasal mucosa. Patient was sedated with Propofol for the sleep endoscopy.      Once effective Propofol sedation was achieved, patient slept comfortably.    A flexible endoscope was passed via the nares to evaluate sites of dynamic airway collapse.      STRUCTURE  OBSTRXN A-P  LATERAL  CONCENTRIC  COMMENTS    VELUM  2 2 1        OROPHARYNX  2   2       TONGUE BASE  2 2           EPIGLOTTIS  0              There is significant nasal obstruction.     The most significant finding was multilevel collapse.      Jaw thrust significantly improved airway collapsibility.        Patient's lowest oxygen desaturation was 90%.     The patient was then awakened in the operating room with discontinuation of Propofol infusion.     With these findings patient would be a good surgical candidate for HNS.         Procedure Details:   PROCEDURES PERFORMED:  1. Septoplasty   2. Inferior turbinate outfracture and reduction     ANESTHESIA: General anesthesia via oral endotracheal tube            DESCRIPTION OF PROCEDURE:        The patient was taken from the preoperative area to the [place] [address] by the anesthesia service. A surgery huddle was performed. The patient was then intubated via oral endotracheal tube without complications. The patient was then turned back to the otolaryngology sleep surgery service.        The nasal septum was injected with a mixture of 1% lidocaine with 1:100,000 of epinephrine bilaterally. An afrin-soaked pledget was placed in each nostril. The inferior turbinates were injected with 1% lidocaine with 1:100,000 epinephrine, and sprayed with oxymetazoline nasal spray. The face was prepped and draped in the usual sterile fashion. The eyelids were gently taped sterilely after removing any prep from the lid skin. Surgical time out was performed.        Attention was then turned to the nasal procedures.  A left-sided hemitransfixion incision was made with a #15 blade. A submucoperichondrial plane was elevated posteriorly to the bony septum, inferiorly to the floor of the nose, and superiorly to the dorsum. Preserving greater than one cm L-strut of dorsal and caudal septal cartilage, 15 blade was used to remove the quadrangular cartilage inferoposteriorly. This was saved in saline. Bony irregularities of the septum were then addressed. Care was taken to avoid torque on the skull  base. The keystone area was preserved.    A 5-0 fast was used to suture the mela transfixion incision.       Submucosal reduction of the inferior turbinates was performed bilaterally with the microdebrider. Mucosa was preserved. Towns elevator was used to infracture the turbinates, followed by outfracturing by a [place] elevator.      Bilateral montoya splints coated in bacitracin were placed. A 2-0 nylon was used to secure splints through the septum anteriorly. Thorough wound check was performed.       The pharynx was then reinspected to ensure hemostasis.         Patient was then turned back to anesthesia and extubated without complications.       Condition: Stable to PACU.       Fluids: see anesthesia      Complication: None       Attending surgeon was scrubbed in and actively performed the operation      Evidence of Infection: No   Complications:  None; patient tolerated the procedure well.    Disposition: PACU - hemodynamically stable.  Condition: stable         Additional Details:     Attending Attestation: I performed the procedure.    Kian Garcia  Phone Number: [phone]

## 2025-08-08 NOTE — ANESTHESIA POSTPROCEDURE EVALUATION
Patient: Eliseo Patricio    Procedure Summary       Date: 08/08/25 Room / Location: [facility] / [facility]    Anesthesia Start: 1147 Anesthesia Stop: 1305    Procedures:       SEPTOPLASTY/ SUMUCOSAL RESECTION/ INFERIOR TURBINATE OUTFRACTURE (Nose)      DRUG INDUCED SLEEP ENDOSCOPY (Nose) Diagnosis:       DAVID (obstructive sleep apnea)      (DAVID (obstructive sleep apnea) [G47.33])    Surgeons: Kian Garcia MD Responsible Provider: Mesfin Hammonds MD    Anesthesia Type: general ASA Status: 3            Anesthesia Type: general    Vitals Value Taken Time   /90 08/08/25 13:03   Temp 36 °C (96.8 °F) 08/08/25 13:03   Pulse 85 08/08/25 13:03   Resp 18 08/08/25 13:03   SpO2 99 % 08/08/25 13:03       Anesthesia Post Evaluation    Patient location during evaluation: PACU  Patient participation: complete - patient participated  Level of consciousness: awake  Pain score: 0  Pain management: adequate  Airway patency: patent  Cardiovascular status: acceptable  Respiratory status: acceptable  Hydration status: acceptable  Postoperative Nausea and Vomiting: none        There were no known notable events for this encounter.

## 2025-08-08 NOTE — DISCHARGE INSTRUCTIONS
[facility]        Home Going Instructions after Septoplasty/ turbinate reduction    Activity:   We do not recommend any exercise on the next 14 days of your septoplasty.  Return to work will be discussed after your post-op appointment.  You may have light bleeding or spotting for several days after septoplasty .      Eating/drinking:  Drink small amounts of liquids initially and then slowly increase your intake of food. Drinking fluids will keep your bowels regular.   Avoid foods that are spicy or hard to digest today.  You may take a stool softener or miralax/milk of magnesia to help with constipation that may occur after anesthesia.  Please make sure a responsible adult is with you for at least 24 hours after surgery and do not drive or make important decisions during this time. Anesthesia may affect your judgment, coordination, and reaction time.      Bleeding:  It is normal to experience some bleeding from the mouth and nose for the first 7 to 10 days after your jaw surgery. This should not however, be profuse, excessive.  Applying ice over the nose and face will usually help stop the bleeding. You may also squirt afrin to both nose to help stop bleeding.   Pain:  As discussed this is a painful post-op. Please stay on top of pain medication for the initial 5-7 days. As you feel pain is not that significant try to tamper of stronger pain medication (oxycodone) and relay more on tylenol.     Trouble Breathing  If you have trouble breathing through your nose, spray Afrin once in each nostril for decongestion. Make sure to use nasal saline (2 sprays in each nostril every 3 hours) to remove blood debris.     Follow up:  Follow up with Dr Duke a few days after your procedure as scheduled to check in and make sure you know what your next steps are.     When to call your provider: (Provider: [phone]. After hours call  and ask for the ENT resident  on-call)  Profuse bleeding that does not taper down.  Fever of 100.4 or higher with chills.    Kian Garcia MD

## 2025-08-11 RX ORDER — RISANKIZUMAB-RZAA 150 MG/ML
150 INJECTION SUBCUTANEOUS
Qty: 1 ML | Refills: 1 | Status: SHIPPED | OUTPATIENT
Start: 2025-08-11

## 2025-08-11 RX ORDER — RISANKIZUMAB-RZAA 150 MG/ML
150 INJECTION SUBCUTANEOUS
Qty: 2 ML | Refills: 0 | Status: SHIPPED | OUTPATIENT
Start: 2025-08-11

## 2025-08-12 ENCOUNTER — SPECIALTY PHARMACY (OUTPATIENT)
Dept: PHARMACY | Facility: CLINIC | Age: 55
End: 2025-08-12

## 2025-08-13 ENCOUNTER — APPOINTMENT (OUTPATIENT)
Dept: OTOLARYNGOLOGY | Facility: CLINIC | Age: 55
End: 2025-08-13
Payer: COMMERCIAL

## 2025-08-13 DIAGNOSIS — G47.33 OSA (OBSTRUCTIVE SLEEP APNEA): Primary | ICD-10-CM

## 2025-08-13 PROCEDURE — 99024 POSTOP FOLLOW-UP VISIT: CPT

## 2025-08-18 ENCOUNTER — TELEPHONE (OUTPATIENT)
Dept: DERMATOLOGY | Facility: HOSPITAL | Age: 55
End: 2025-08-18
Payer: COMMERCIAL

## 2025-08-18 DIAGNOSIS — L40.9 PSORIASIS: ICD-10-CM

## 2025-08-18 RX ORDER — RISANKIZUMAB-RZAA 150 MG/ML
150 INJECTION SUBCUTANEOUS
Qty: 2 ML | Refills: 0 | Status: SHIPPED | OUTPATIENT
Start: 2025-08-18

## 2025-08-18 RX ORDER — RISANKIZUMAB-RZAA 150 MG/ML
150 INJECTION SUBCUTANEOUS
Qty: 1 ML | Refills: 1 | Status: SHIPPED | OUTPATIENT
Start: 2025-08-18

## 2025-09-05 ENCOUNTER — CLINICAL SUPPORT (OUTPATIENT)
Dept: DERMATOLOGY | Facility: CLINIC | Age: 55
End: 2025-09-05
Payer: COMMERCIAL

## 2025-09-10 ENCOUNTER — APPOINTMENT (OUTPATIENT)
Dept: UROLOGY | Facility: HOSPITAL | Age: 55
End: 2025-09-10
Payer: COMMERCIAL

## 2025-10-13 ENCOUNTER — APPOINTMENT (OUTPATIENT)
Dept: DERMATOLOGY | Facility: HOSPITAL | Age: 55
End: 2025-10-13
Payer: COMMERCIAL

## 2025-10-24 ENCOUNTER — APPOINTMENT (OUTPATIENT)
Dept: OPHTHALMOLOGY | Facility: CLINIC | Age: 55
End: 2025-10-24
Payer: COMMERCIAL

## 2025-11-06 ENCOUNTER — APPOINTMENT (OUTPATIENT)
Dept: PRIMARY CARE | Facility: CLINIC | Age: 55
End: 2025-11-06
Payer: COMMERCIAL

## 2025-12-03 ENCOUNTER — APPOINTMENT (OUTPATIENT)
Dept: DERMATOLOGY | Facility: CLINIC | Age: 55
End: 2025-12-03
Payer: COMMERCIAL

## 2025-12-08 ENCOUNTER — APPOINTMENT (OUTPATIENT)
Dept: RHEUMATOLOGY | Facility: CLINIC | Age: 55
End: 2025-12-08
Payer: COMMERCIAL

## 2025-12-10 ENCOUNTER — APPOINTMENT (OUTPATIENT)
Dept: OTOLARYNGOLOGY | Facility: CLINIC | Age: 55
End: 2025-12-10
Payer: COMMERCIAL

## 2025-12-15 ENCOUNTER — APPOINTMENT (OUTPATIENT)
Facility: CLINIC | Age: 55
End: 2025-12-15
Payer: COMMERCIAL

## (undated) DEVICE — BLADE, INFERIOR TURBINATE, M4 ROTATABLE, 2MM STR

## (undated) DEVICE — DRAPE, INSTRUMENT, W/POUCH, STERI DRAPE, 7 X 11 IN, DISPOSABLE, STERILE

## (undated) DEVICE — ELECTRODE, ELECTROSURGICAL, BLADE, INSULATED, ENT/IMA, STERILE

## (undated) DEVICE — Device

## (undated) DEVICE — VATHIN, SINGLE USERHINOLARYNGOSCOPE DIAG

## (undated) DEVICE — SPONGE, NEURO, 1/2 X 3IN, STERILE, LF

## (undated) DEVICE — PROTECTOR, HEEL/ANKLE/ELBOW, UNIVERSAL

## (undated) DEVICE — SLEEVE, VASO PRESS, CALF GARMENT, MEDIUM, GREEN